# Patient Record
Sex: FEMALE | Race: WHITE | NOT HISPANIC OR LATINO | URBAN - METROPOLITAN AREA
[De-identification: names, ages, dates, MRNs, and addresses within clinical notes are randomized per-mention and may not be internally consistent; named-entity substitution may affect disease eponyms.]

---

## 2017-05-01 ENCOUNTER — INPATIENT (INPATIENT)
Facility: HOSPITAL | Age: 77
LOS: 3 days | Discharge: HOME | DRG: 392 | End: 2017-05-05
Attending: HOSPITALIST | Admitting: INTERNAL MEDICINE
Payer: COMMERCIAL

## 2017-05-01 VITALS
HEART RATE: 87 BPM | RESPIRATION RATE: 18 BRPM | DIASTOLIC BLOOD PRESSURE: 82 MMHG | TEMPERATURE: 98 F | WEIGHT: 166.89 LBS | HEIGHT: 66 IN | SYSTOLIC BLOOD PRESSURE: 152 MMHG | OXYGEN SATURATION: 100 %

## 2017-05-01 PROBLEM — Z00.00 ENCOUNTER FOR PREVENTIVE HEALTH EXAMINATION: Status: ACTIVE | Noted: 2017-05-01

## 2017-05-01 RX ORDER — SODIUM CHLORIDE 9 MG/ML
3 INJECTION INTRAMUSCULAR; INTRAVENOUS; SUBCUTANEOUS EVERY 8 HOURS
Qty: 0 | Refills: 0 | Status: DISCONTINUED | OUTPATIENT
Start: 2017-05-01 | End: 2017-05-05

## 2017-05-01 NOTE — ED ADULT TRIAGE NOTE - CHIEF COMPLAINT QUOTE
Pt c/o suprapubic pain since Saturday and "went back to doctor today because the pain got worse and the doctor wanted me to get a cat scan but my insurance wouldn't approve it", denies pain/burning upon urination, c/o "watery, slimey something coming out of my rectum"

## 2017-05-01 NOTE — ED STATDOCS - PROGRESS NOTE DETAILS
75 y/o F pt w/ PMHx of diverticulitis presents to the ED c/o abd pain and rectal pain x2 days. Pt states that she had an abscess in her abd years ago. Pt went to her PMD and was sent to get a CT but could not. Pt states that her pain is diffuse but is worse to the lower abd. Pt states her BM's are liquid. Pt denies constipation, fever, chills, or any other complaints. NKDA. Focused evaluation, orders entered, placed in main for further evaluation by another provider.

## 2017-05-01 NOTE — ED PROVIDER NOTE - NS ED MD SCRIBE ATTENDING SCRIBE SECTIONS
HISTORY OF PRESENT ILLNESS/VITAL SIGNS( Pullset)/PHYSICAL EXAM/PAST MEDICAL/SURGICAL/SOCIAL HISTORY/REVIEW OF SYSTEMS/DISPOSITION

## 2017-05-01 NOTE — ED STATDOCS - OBJECTIVE STATEMENT
75 y/o F pt w/ PMHx of presents to the ED c/o abd pain and rectal pain x2 days. Pt states that she had an abscess in her abd years ago. Pt went to her PMD and was sent to get a CT but could not. Pt states that her pain is diffuse but is worse to the lower abd. Pt states her BM's are liquid. Pt denies constipation, fever, chills, or any other complaints. NKDA.

## 2017-05-01 NOTE — ED PROVIDER NOTE - OBJECTIVE STATEMENT
75 y/o F pt presents to ED c/o abdominal pain and diarrhea x4 days. Pt reports pain worsened 3 days ago and she went to the doctor and was put on abx. She went back to her doctor today and was sent to the ED for further evaluation. She notes her sx are similar to those she had with diverticulitis in the past. Pt denies fever, CP, SOB, nausea, vomiting, back pain, and neck pain. No further complaints at this time. Allergy to penicillin.

## 2017-05-02 DIAGNOSIS — K57.32 DIVERTICULITIS OF LARGE INTESTINE WITHOUT PERFORATION OR ABSCESS WITHOUT BLEEDING: ICD-10-CM

## 2017-05-02 LAB
ALBUMIN SERPL ELPH-MCNC: 3.5 G/DL — SIGNIFICANT CHANGE UP (ref 3.3–5.2)
ALP SERPL-CCNC: 67 U/L — SIGNIFICANT CHANGE UP (ref 40–120)
ALT FLD-CCNC: 9 U/L — SIGNIFICANT CHANGE UP
ANION GAP SERPL CALC-SCNC: 14 MMOL/L — SIGNIFICANT CHANGE UP (ref 5–17)
APPEARANCE UR: CLEAR — SIGNIFICANT CHANGE UP
APPEARANCE UR: SIGNIFICANT CHANGE UP
APTT BLD: 30.7 SEC — SIGNIFICANT CHANGE UP (ref 27.5–37.4)
AST SERPL-CCNC: 13 U/L — SIGNIFICANT CHANGE UP
BACTERIA # UR AUTO: ABNORMAL
BASOPHILS # BLD AUTO: 0 K/UL — SIGNIFICANT CHANGE UP (ref 0–0.2)
BASOPHILS NFR BLD AUTO: 0.2 % — SIGNIFICANT CHANGE UP (ref 0–2)
BILIRUB SERPL-MCNC: 0.6 MG/DL — SIGNIFICANT CHANGE UP (ref 0.4–2)
BILIRUB UR-MCNC: NEGATIVE — SIGNIFICANT CHANGE UP
BILIRUB UR-MCNC: SIGNIFICANT CHANGE UP
BLD GP AB SCN SERPL QL: SIGNIFICANT CHANGE UP
BUN SERPL-MCNC: 13 MG/DL — SIGNIFICANT CHANGE UP (ref 8–20)
CALCIUM SERPL-MCNC: 8.1 MG/DL — LOW (ref 8.6–10.2)
CHLORIDE SERPL-SCNC: 105 MMOL/L — SIGNIFICANT CHANGE UP (ref 98–107)
CO2 SERPL-SCNC: 20 MMOL/L — LOW (ref 22–29)
COLOR SPEC: SIGNIFICANT CHANGE UP
COLOR SPEC: YELLOW — SIGNIFICANT CHANGE UP
CREAT SERPL-MCNC: 0.56 MG/DL — SIGNIFICANT CHANGE UP (ref 0.5–1.3)
DIFF PNL FLD: ABNORMAL
DIFF PNL FLD: SIGNIFICANT CHANGE UP
EOSINOPHIL # BLD AUTO: 0 K/UL — SIGNIFICANT CHANGE UP (ref 0–0.5)
EOSINOPHIL NFR BLD AUTO: 0.2 % — SIGNIFICANT CHANGE UP (ref 0–6)
EPI CELLS # UR: SIGNIFICANT CHANGE UP
GLUCOSE SERPL-MCNC: 109 MG/DL — SIGNIFICANT CHANGE UP (ref 70–115)
GLUCOSE UR QL: NEGATIVE MG/DL — SIGNIFICANT CHANGE UP
GLUCOSE UR QL: SIGNIFICANT CHANGE UP MG/DL
HCT VFR BLD CALC: 38.5 % — SIGNIFICANT CHANGE UP (ref 37–47)
HGB BLD-MCNC: 13 G/DL — SIGNIFICANT CHANGE UP (ref 12–16)
INR BLD: 1.19 RATIO — HIGH (ref 0.88–1.16)
KETONES UR-MCNC: ABNORMAL
KETONES UR-MCNC: SIGNIFICANT CHANGE UP
LEUKOCYTE ESTERASE UR-ACNC: ABNORMAL
LEUKOCYTE ESTERASE UR-ACNC: SIGNIFICANT CHANGE UP
LIDOCAIN IGE QN: 17 U/L — LOW (ref 22–51)
LYMPHOCYTES # BLD AUTO: 1.7 K/UL — SIGNIFICANT CHANGE UP (ref 1–4.8)
LYMPHOCYTES # BLD AUTO: 13.3 % — LOW (ref 20–55)
MCHC RBC-ENTMCNC: 30.2 PG — SIGNIFICANT CHANGE UP (ref 27–31)
MCHC RBC-ENTMCNC: 33.8 G/DL — SIGNIFICANT CHANGE UP (ref 32–36)
MCV RBC AUTO: 89.5 FL — SIGNIFICANT CHANGE UP (ref 81–99)
MONOCYTES # BLD AUTO: 1 K/UL — HIGH (ref 0–0.8)
MONOCYTES NFR BLD AUTO: 7.7 % — SIGNIFICANT CHANGE UP (ref 3–10)
NEUTROPHILS # BLD AUTO: 10.3 K/UL — HIGH (ref 1.8–8)
NEUTROPHILS NFR BLD AUTO: 78.4 % — HIGH (ref 37–73)
NITRITE UR-MCNC: POSITIVE
NITRITE UR-MCNC: SIGNIFICANT CHANGE UP
PH UR: 5 — SIGNIFICANT CHANGE UP (ref 5–8)
PH UR: SIGNIFICANT CHANGE UP (ref 5–8)
PLATELET # BLD AUTO: 305 K/UL — SIGNIFICANT CHANGE UP (ref 150–400)
POTASSIUM SERPL-MCNC: 3.6 MMOL/L — SIGNIFICANT CHANGE UP (ref 3.5–5.3)
POTASSIUM SERPL-SCNC: 3.6 MMOL/L — SIGNIFICANT CHANGE UP (ref 3.5–5.3)
PROT SERPL-MCNC: 6.4 G/DL — LOW (ref 6.6–8.7)
PROT UR-MCNC: 30 MG/DL
PROT UR-MCNC: SIGNIFICANT CHANGE UP MG/DL
PROTHROM AB SERPL-ACNC: 13.1 SEC — HIGH (ref 9.8–12.7)
RBC # BLD: 4.3 M/UL — LOW (ref 4.4–5.2)
RBC # FLD: 13.3 % — SIGNIFICANT CHANGE UP (ref 11–15.6)
RBC CASTS # UR COMP ASSIST: ABNORMAL /HPF (ref 0–4)
RBC CASTS # UR COMP ASSIST: SIGNIFICANT CHANGE UP /HPF (ref 0–4)
SODIUM SERPL-SCNC: 139 MMOL/L — SIGNIFICANT CHANGE UP (ref 135–145)
SP GR SPEC: 1.01 — SIGNIFICANT CHANGE UP (ref 1.01–1.02)
SP GR SPEC: SIGNIFICANT CHANGE UP (ref 1.01–1.02)
TROPONIN T SERPL-MCNC: <0.01 NG/ML — SIGNIFICANT CHANGE UP (ref 0–0.06)
TYPE + AB SCN PNL BLD: SIGNIFICANT CHANGE UP
UROBILINOGEN FLD QL: 1 MG/DL
UROBILINOGEN FLD QL: SIGNIFICANT CHANGE UP MG/DL
WBC # BLD: 13.1 K/UL — HIGH (ref 4.8–10.8)
WBC # FLD AUTO: 13.1 K/UL — HIGH (ref 4.8–10.8)
WBC UR QL: ABNORMAL
WBC UR QL: SIGNIFICANT CHANGE UP /HPF (ref 0–5)

## 2017-05-02 PROCEDURE — 74177 CT ABD & PELVIS W/CONTRAST: CPT | Mod: 26

## 2017-05-02 PROCEDURE — 99285 EMERGENCY DEPT VISIT HI MDM: CPT

## 2017-05-02 PROCEDURE — 99222 1ST HOSP IP/OBS MODERATE 55: CPT

## 2017-05-02 RX ORDER — METRONIDAZOLE 500 MG
500 TABLET ORAL ONCE
Qty: 0 | Refills: 0 | Status: DISCONTINUED | OUTPATIENT
Start: 2017-05-02 | End: 2017-05-02

## 2017-05-02 RX ORDER — CEFTRIAXONE 500 MG/1
INJECTION, POWDER, FOR SOLUTION INTRAMUSCULAR; INTRAVENOUS
Qty: 0 | Refills: 0 | Status: DISCONTINUED | OUTPATIENT
Start: 2017-05-02 | End: 2017-05-05

## 2017-05-02 RX ORDER — SODIUM CHLORIDE 9 MG/ML
1000 INJECTION INTRAMUSCULAR; INTRAVENOUS; SUBCUTANEOUS ONCE
Qty: 0 | Refills: 0 | Status: COMPLETED | OUTPATIENT
Start: 2017-05-02 | End: 2017-05-02

## 2017-05-02 RX ORDER — CEFTRIAXONE 500 MG/1
1 INJECTION, POWDER, FOR SOLUTION INTRAMUSCULAR; INTRAVENOUS ONCE
Qty: 0 | Refills: 0 | Status: COMPLETED | OUTPATIENT
Start: 2017-05-02 | End: 2017-05-02

## 2017-05-02 RX ORDER — CEFPODOXIME PROXETIL 100 MG
200 TABLET ORAL ONCE
Qty: 0 | Refills: 0 | Status: COMPLETED | OUTPATIENT
Start: 2017-05-02 | End: 2017-05-02

## 2017-05-02 RX ORDER — METRONIDAZOLE 500 MG
500 TABLET ORAL EVERY 8 HOURS
Qty: 0 | Refills: 0 | Status: DISCONTINUED | OUTPATIENT
Start: 2017-05-02 | End: 2017-05-05

## 2017-05-02 RX ORDER — METRONIDAZOLE 500 MG
500 TABLET ORAL ONCE
Qty: 0 | Refills: 0 | Status: COMPLETED | OUTPATIENT
Start: 2017-05-02 | End: 2017-05-02

## 2017-05-02 RX ORDER — ACETAMINOPHEN 500 MG
650 TABLET ORAL EVERY 6 HOURS
Qty: 0 | Refills: 0 | Status: DISCONTINUED | OUTPATIENT
Start: 2017-05-02 | End: 2017-05-05

## 2017-05-02 RX ORDER — ENOXAPARIN SODIUM 100 MG/ML
40 INJECTION SUBCUTANEOUS EVERY 24 HOURS
Qty: 0 | Refills: 0 | Status: DISCONTINUED | OUTPATIENT
Start: 2017-05-02 | End: 2017-05-05

## 2017-05-02 RX ORDER — CEFTRIAXONE 500 MG/1
1 INJECTION, POWDER, FOR SOLUTION INTRAMUSCULAR; INTRAVENOUS EVERY 24 HOURS
Qty: 0 | Refills: 0 | Status: DISCONTINUED | OUTPATIENT
Start: 2017-05-03 | End: 2017-05-05

## 2017-05-02 RX ORDER — MORPHINE SULFATE 50 MG/1
4 CAPSULE, EXTENDED RELEASE ORAL ONCE
Qty: 0 | Refills: 0 | Status: DISCONTINUED | OUTPATIENT
Start: 2017-05-02 | End: 2017-05-02

## 2017-05-02 RX ORDER — MORPHINE SULFATE 50 MG/1
2 CAPSULE, EXTENDED RELEASE ORAL EVERY 4 HOURS
Qty: 0 | Refills: 0 | Status: DISCONTINUED | OUTPATIENT
Start: 2017-05-02 | End: 2017-05-05

## 2017-05-02 RX ORDER — DEXTROSE MONOHYDRATE, SODIUM CHLORIDE, AND POTASSIUM CHLORIDE 50; .745; 4.5 G/1000ML; G/1000ML; G/1000ML
1000 INJECTION, SOLUTION INTRAVENOUS
Qty: 0 | Refills: 0 | Status: DISCONTINUED | OUTPATIENT
Start: 2017-05-02 | End: 2017-05-05

## 2017-05-02 RX ORDER — ONDANSETRON 8 MG/1
4 TABLET, FILM COATED ORAL EVERY 8 HOURS
Qty: 0 | Refills: 0 | Status: DISCONTINUED | OUTPATIENT
Start: 2017-05-02 | End: 2017-05-05

## 2017-05-02 RX ADMIN — ENOXAPARIN SODIUM 40 MILLIGRAM(S): 100 INJECTION SUBCUTANEOUS at 17:06

## 2017-05-02 RX ADMIN — CEFTRIAXONE 100 GRAM(S): 500 INJECTION, POWDER, FOR SOLUTION INTRAMUSCULAR; INTRAVENOUS at 17:06

## 2017-05-02 RX ADMIN — Medication 100 MILLIGRAM(S): at 22:33

## 2017-05-02 RX ADMIN — SODIUM CHLORIDE 3 MILLILITER(S): 9 INJECTION INTRAMUSCULAR; INTRAVENOUS; SUBCUTANEOUS at 22:35

## 2017-05-02 RX ADMIN — Medication 200 MILLIGRAM(S): at 13:08

## 2017-05-02 RX ADMIN — DEXTROSE MONOHYDRATE, SODIUM CHLORIDE, AND POTASSIUM CHLORIDE 75 MILLILITER(S): 50; .745; 4.5 INJECTION, SOLUTION INTRAVENOUS at 17:06

## 2017-05-02 RX ADMIN — SODIUM CHLORIDE 250 MILLILITER(S): 9 INJECTION INTRAMUSCULAR; INTRAVENOUS; SUBCUTANEOUS at 01:15

## 2017-05-02 RX ADMIN — SODIUM CHLORIDE 3 MILLILITER(S): 9 INJECTION INTRAMUSCULAR; INTRAVENOUS; SUBCUTANEOUS at 05:24

## 2017-05-02 RX ADMIN — SODIUM CHLORIDE 3 MILLILITER(S): 9 INJECTION INTRAMUSCULAR; INTRAVENOUS; SUBCUTANEOUS at 01:05

## 2017-05-02 RX ADMIN — Medication 100 MILLIGRAM(S): at 13:57

## 2017-05-02 RX ADMIN — SODIUM CHLORIDE 3 MILLILITER(S): 9 INJECTION INTRAMUSCULAR; INTRAVENOUS; SUBCUTANEOUS at 12:39

## 2017-05-02 NOTE — H&P ADULT - ASSESSMENT
This is 76Y W with PMHx of diverticulitis about 7 years ago, colonoscopy about 6 years ago came to Ed c/o lower abdominal pain since Friday, associated with nausea and vomiting. As per patient, symptoms started on Friday, gradual in onset, getting worse, affecting lower abdomen L>R, moderate in intensity, worse during BM, associated with nausea, vomiting, poor oral appetite. She went to see PCP, started on cipro and flagyl, symptoms cont. to worsen, that's why she came to ED. She report chills and low grade fever. She is also c/o multiple episode of loose stool accompanied by mucus. Ct abdomen showed sigmoid diverticulitis.     A/P    >Sigmoid diverticulitis - failed outpatient therapy   admit to medical floor  start Rocephin and azithromycin   f/u blood c/s  zofran, pain control  GI consult requested - Dr. Crawley  clear liquid diet  IVF   f/u cbc, BMP    >DVT PPX -  Lovenox

## 2017-05-02 NOTE — H&P ADULT - NSHPPHYSICALEXAM_GEN_ALL_CORE
ICU Vital Signs Last 24 Hrs  T(C): 36.8, Max: 37.8 (05-02 @ 02:50)  T(F): 98.3, Max: 100 (05-02 @ 02:50)  HR: 71 (71 - 87)  BP: 156/76 (152/82 - 170/79)  BP(mean): --  ABP: --  ABP(mean): --  RR: 18 (18 - 18)  SpO2: 98% (98% - 100%)

## 2017-05-02 NOTE — H&P ADULT - HISTORY OF PRESENT ILLNESS
This is 76Y W with PMHx of diverticulitis about 7 years ago, colonoscopy about 6 years ago came to Ed c/o lower abdominal pain since Friday, associated with nausea and vomiting. As per patient, symptoms started on Friday, gradual in onset, getting worse, affecting lower abdomen L>R, moderate in intensity, worse during BM, associated with nausea, vomiting, poor oral appetite. She went to see PCP, started on cipro and flagyl, symptoms cont. to worsen, that's why she came to ED. She report chills and low grade fever. She is also c/o multiple episode of loose stool accompanied by mucus. Ct abdomen showed sigmoid diverticulitis.     As per patient, she had 1 episode of diverticulitis about 7 years ago, she also had colonoscopy after that, it was negative    Denied chest pain, SOB, headache, dizziness, weakness, numbness, urinary complaints.

## 2017-05-02 NOTE — ED ADULT NURSE REASSESSMENT NOTE - NS ED NURSE REASSESS COMMENT FT1
Patient ambulatory without difficulty, awake, alert, and oriented times 3, breathing unlabored.  Patient ambulatory without difficulty, steady gait.  Patient aware of plan of care.  awaiting CT results.  States improvement.  Will continue to monitor
Patient resting quietly in stretcher, breathing unlabored.  Awaiting CT results.  Patient aware of plan of care.  Will continue to monitor
pt sleeping and appears comfortable, son at bedside, A&Ox3, pt c/o of abd pain but refuses any pain medications, resp even and unlabored no distress noted, will continue to monitor
Patient sitting comfortably on stretcher, awake, alert, and oriented times 3, breathing unlabored.  patient states improvement.  Awaiting CT scan.  patient aware of plan of care.  Will continue to monitor

## 2017-05-02 NOTE — ED ADULT NURSE NOTE - OBJECTIVE STATEMENT
pt A&Ox3, c/o generalized abd pain that started friday and increase saturday and went to PCP and was told she needed a CT pt has HX of diverticulitis and feels the same as the last time it was bothering her, resp even and unlabored no distress noted, abd soft and tender, pt denies ha, dizziness, chest pain n/v/d

## 2017-05-02 NOTE — CONSULT NOTE ADULT - SUBJECTIVE AND OBJECTIVE BOX
HPI:  77 y/o F pt presents to ED c/o abdominal pain and diarrhea x4 days. Pt reports pain worsened 3 days ago and she went to the doctor and was put on abx. She went back to her doctor today and was sent to the ED for further evaluation. She notes her sx are similar to those she had with diverticulitis in the past. Pt denies fever, CP, SOB, nausea, vomiting, back pain, and neck pain. No further complaints at this time. Allergy to penicillin.  as per patient, she has h/o acute diverticulitis about 8 yrs ago. she had colonoscopy by Dr. Grullon 8 yrs ago. she was now admitted with acute recurrent diverticulitis. no rectal bleed.	  	  	  	  	  	  	    PAST MEDICAL/SURGICAL/FAMILY/SOCIAL HISTORY:   Past Medical History:  No pertinent past medical history.    Past Surgical History:  No significant past surgical history.    Tobacco Usage:  · Tobacco Usage	Unknown if ever smoked	    · Attestation Comment: I have reviewed and confirmed nurses' notes for patient's medications, allergies, medical history, and surgical history.	    ALLERGIES AND HOME MEDICATIONS:   Allergies:        Allergies:  	penicillin: Drug, Unknown      PAST MEDICAL & SURGICAL HISTORY:  Diverticulitis  No significant past surgical history      REVIEW OF SYSTEMS    General: unremarkable, no fever    Skin/Breast: unremarkable  	  Ophthalmologic: unremarkable  	  ENMT:	unremarkable    Respiratory and Thorax: unremarkable  	  Cardiovascular:	unremarkable    Gastrointestinal:	 as above    Genitourinary:	unremarkable    Musculoskeletal:	unremarkable    Neurological:	unremarkable    Psychiatric:	unremarkable    Hematology/Lymphatics:	unremarkable    Endocrine:	unremarkable    Allergic/Immunologic:	unremarkable      MEDICATIONS  (STANDING):  sodium chloride 0.9% lock flush 3milliLiter(s) IV Push every 8 hours  cefTRIAXone   IVPB 1Gram(s) IV Intermittent once  metroNIDAZOLE  IVPB 500milliGRAM(s) IV Intermittent every 8 hours  cefTRIAXone   IVPB  IV Intermittent   sodium chloride 0.45% with potassium chloride 20 mEq/L 1000milliLiter(s) IV Continuous <Continuous>  enoxaparin Injectable 40milliGRAM(s) SubCutaneous every 24 hours    MEDICATIONS  (PRN):  ondansetron Injectable 4milliGRAM(s) IV Push every 8 hours PRN Nausea and/or Vomiting  morphine  - Injectable 2milliGRAM(s) IV Push every 4 hours PRN Severe Pain (7 - 10)  acetaminophen   Tablet 650milliGRAM(s) Oral every 6 hours PRN For Temp greater than 38 C (100.4 F)      Allergies    penicillin (Unknown)    Intolerances        SOCIAL HISTORY:    FAMILY HISTORY:      Vital Signs Last 24 Hrs  T(C): 36.8, Max: 37.8 ( @ 02:50)  T(F): 98.3, Max: 100 ( @ 02:50)  HR: 71 (71 - 87)  BP: 156/76 (152/82 - 170/79)  BP(mean): --  RR: 18 (18 - 18)  SpO2: 98% (98% - 100%)      PHYSICAL EXAM:    Constitutional: no fever, hemodynamically stable    Eyes: unremarkable    ENMT: unremarkable    Neck: unremarkable    Breasts: deferred    Back: unremarkable    Respiratory: unremarkable    Cardiovascular: unremarkable    Gastrointestinal: bowel sounds present, soft, non-tender, no organomegaly    Genitourinary: deferred    Rectal: deferred    Extremities: unremarkable    Vascular: unremarkable    Neurological: Awake, alert, oriented x3, no focal neurological deficit    Skin: unremarkable    Lymph Nodes: deferred    Musculoskeletal: unremarkable    Psychiatric: unremarkable    LABS:                        13.0   13.1  )-----------( 305      ( 02 May 2017 02:29 )             38.5         139  |  105  |  13.0  ----------------------------<  109  3.6   |  20.0<L>  |  0.56    Ca    8.1<L>      02 May 2017 04:25    TPro  6.4<L>  /  Alb  3.5  /  TBili  0.6  /  DBili  x   /  AST  13  /  ALT  9   /  AlkPhos  67  05-02    PT/INR - ( 02 May 2017 02:29 )   PT: 13.1 sec;   INR: 1.19 ratio         PTT - ( 02 May 2017 02:29 )  PTT:30.7 sec  Urinalysis Basic - ( 02 May 2017 12:09 )    Color: Yellow / Appearance: Clear / S.010 / pH: x  Gluc: x / Ketone: Small  / Bili: Negative / Urobili: 1 mg/dL   Blood: x / Protein: 30 mg/dL / Nitrite: Positive   Leuk Esterase: Small / RBC: 3-5 /HPF / WBC 6-10   Sq Epi: x / Non Sq Epi: Few / Bacteria: Few        RADIOLOGY & ADDITIONAL STUDIES:        IMPRESSION:  77 y/o F with h/o acute diverticulitis 8 yrs ago, who was admitted with recurrent sigmoid diverticulitis. 	        PLAN:  continue clears for now  IV/oral  antibiotics (total 2 weeks)  IV hydration  outpatient colonoscopy in 6-8 weeks    thanks for the consult.

## 2017-05-03 LAB
ABO RH CONFIRMATION: SIGNIFICANT CHANGE UP
ANION GAP SERPL CALC-SCNC: 15 MMOL/L — SIGNIFICANT CHANGE UP (ref 5–17)
BUN SERPL-MCNC: 10 MG/DL — SIGNIFICANT CHANGE UP (ref 8–20)
CALCIUM SERPL-MCNC: 8.2 MG/DL — LOW (ref 8.6–10.2)
CHLORIDE SERPL-SCNC: 109 MMOL/L — HIGH (ref 98–107)
CO2 SERPL-SCNC: 20 MMOL/L — LOW (ref 22–29)
CREAT SERPL-MCNC: 0.53 MG/DL — SIGNIFICANT CHANGE UP (ref 0.5–1.3)
GLUCOSE SERPL-MCNC: 91 MG/DL — SIGNIFICANT CHANGE UP (ref 70–115)
HCT VFR BLD CALC: 32.7 % — LOW (ref 37–47)
HGB BLD-MCNC: 10.9 G/DL — LOW (ref 12–16)
MAGNESIUM SERPL-MCNC: 1.7 MG/DL — LOW (ref 1.8–2.5)
MCHC RBC-ENTMCNC: 30.2 PG — SIGNIFICANT CHANGE UP (ref 27–31)
MCHC RBC-ENTMCNC: 33.3 G/DL — SIGNIFICANT CHANGE UP (ref 32–36)
MCV RBC AUTO: 90.6 FL — SIGNIFICANT CHANGE UP (ref 81–99)
PLATELET # BLD AUTO: 273 K/UL — SIGNIFICANT CHANGE UP (ref 150–400)
POTASSIUM SERPL-MCNC: 3.7 MMOL/L — SIGNIFICANT CHANGE UP (ref 3.5–5.3)
POTASSIUM SERPL-SCNC: 3.7 MMOL/L — SIGNIFICANT CHANGE UP (ref 3.5–5.3)
RBC # BLD: 3.61 M/UL — LOW (ref 4.4–5.2)
RBC # FLD: 12.9 % — SIGNIFICANT CHANGE UP (ref 11–15.6)
SODIUM SERPL-SCNC: 144 MMOL/L — SIGNIFICANT CHANGE UP (ref 135–145)
WBC # BLD: 6.5 K/UL — SIGNIFICANT CHANGE UP (ref 4.8–10.8)
WBC # FLD AUTO: 6.5 K/UL — SIGNIFICANT CHANGE UP (ref 4.8–10.8)

## 2017-05-03 PROCEDURE — 99233 SBSQ HOSP IP/OBS HIGH 50: CPT

## 2017-05-03 RX ORDER — HYDRALAZINE HCL 50 MG
10 TABLET ORAL ONCE
Qty: 0 | Refills: 0 | Status: COMPLETED | OUTPATIENT
Start: 2017-05-03 | End: 2017-05-04

## 2017-05-03 RX ORDER — MAGNESIUM SULFATE 500 MG/ML
1 VIAL (ML) INJECTION ONCE
Qty: 0 | Refills: 0 | Status: COMPLETED | OUTPATIENT
Start: 2017-05-03 | End: 2017-05-03

## 2017-05-03 RX ADMIN — Medication 100 GRAM(S): at 11:44

## 2017-05-03 RX ADMIN — SODIUM CHLORIDE 3 MILLILITER(S): 9 INJECTION INTRAMUSCULAR; INTRAVENOUS; SUBCUTANEOUS at 14:50

## 2017-05-03 RX ADMIN — DEXTROSE MONOHYDRATE, SODIUM CHLORIDE, AND POTASSIUM CHLORIDE 75 MILLILITER(S): 50; .745; 4.5 INJECTION, SOLUTION INTRAVENOUS at 05:24

## 2017-05-03 RX ADMIN — SODIUM CHLORIDE 3 MILLILITER(S): 9 INJECTION INTRAMUSCULAR; INTRAVENOUS; SUBCUTANEOUS at 21:08

## 2017-05-03 RX ADMIN — Medication 100 MILLIGRAM(S): at 15:47

## 2017-05-03 RX ADMIN — Medication 100 MILLIGRAM(S): at 05:23

## 2017-05-03 RX ADMIN — SODIUM CHLORIDE 3 MILLILITER(S): 9 INJECTION INTRAMUSCULAR; INTRAVENOUS; SUBCUTANEOUS at 05:25

## 2017-05-03 RX ADMIN — CEFTRIAXONE 100 GRAM(S): 500 INJECTION, POWDER, FOR SOLUTION INTRAMUSCULAR; INTRAVENOUS at 14:50

## 2017-05-03 RX ADMIN — DEXTROSE MONOHYDRATE, SODIUM CHLORIDE, AND POTASSIUM CHLORIDE 75 MILLILITER(S): 50; .745; 4.5 INJECTION, SOLUTION INTRAVENOUS at 21:08

## 2017-05-03 RX ADMIN — Medication 100 MILLIGRAM(S): at 21:03

## 2017-05-03 NOTE — PROGRESS NOTE ADULT - SUBJECTIVE AND OBJECTIVE BOX
Internal Medicine Hospitalist - Dr. Jc KILGORE    [id number]    76y      Female    Patient is a 76y old  Female who presents with a chief complaint of abdominal pain (02 May 2017 15:00)    HPI:  This is 76Y W with PMHx of diverticulitis about 7 years ago, colonoscopy about 6 years ago came to Ed c/o lower abdominal pain since Friday, associated with nausea and vomiting. As per patient, symptoms started on Friday, gradual in onset, getting worse, affecting lower abdomen L>R, moderate in intensity, worse during BM, associated with nausea, vomiting, poor oral appetite. She went to see PCP, started on cipro and flagyl, symptoms cont. to worsen, that's why she came to ED. She report chills and low grade fever. She is also c/o multiple episode of loose stool accompanied by mucus. Ct abdomen showed sigmoid diverticulitis.     As per patient, she had 1 episode of diverticulitis about 7 years ago, she also had colonoscopy after that, it was negative    Denied chest pain, SOB, headache, dizziness, weakness, numbness, urinary complaints. (02 May 2017 15:00)      INTERVAL HPI/ OVERNIGHT EVENTS: Patient is seen and examined, report abdominal pain is better, still having loose BM, denied nausea, vomiting, fever, chills     REVIEW OF SYSTEMS:    Denied fever, chills nausea, vomiting, chest pain, SOB, headache, dizziness    PHYSICAL EXAM:    Vital Signs Last 24 Hrs  T(C): 36.8, Max: 36.9 (-03 @ 03:07)  T(F): 98.2, Max: 98.4 (05-03 @ 03:07)  HR: 71 (71 - 79)  BP: 147/74 (147/74 - 166/88)  BP(mean): --  RR: 18 (18 - 20)  SpO2: 97% (97% - 100%)    GENERAL: NAD  HEENT: EOMI  Neck: supple  CHEST/LUNG: positive air entry   HEART: S1S2+ audible  ABDOMEN: Soft, Nontender, Nondistended; Bowel sounds present  EXTREMITIES:  no edema  CNS: AAO X 3  Psychiatry: normal mood    LABS:                        10.9   6.5   )-----------( 273      ( 03 May 2017 07:42 )             32.7     -    144  |  109<H>  |  10.0  ----------------------------<  91  3.7   |  20.0<L>  |  0.53    Ca    8.2<L>      03 May 2017 07:42  Mg     1.7     05-03    TPro  6.4<L>  /  Alb  3.5  /  TBili  0.6  /  DBili  x   /  AST  13  /  ALT  9   /  AlkPhos  67  05-02    PT/INR - ( 02 May 2017 02:29 )   PT: 13.1 sec;   INR: 1.19 ratio         PTT - ( 02 May 2017 02:29 )  PTT:30.7 sec  Urinalysis Basic - ( 02 May 2017 12:09 )    Color: Yellow / Appearance: Clear / S.010 / pH: x  Gluc: x / Ketone: Small  / Bili: Negative / Urobili: 1 mg/dL   Blood: x / Protein: 30 mg/dL / Nitrite: Positive   Leuk Esterase: Small / RBC: 3-5 /HPF / WBC 6-10   Sq Epi: x / Non Sq Epi: Few / Bacteria: Few          MEDICATIONS  (STANDING):  sodium chloride 0.9% lock flush 3milliLiter(s) IV Push every 8 hours  metroNIDAZOLE  IVPB 500milliGRAM(s) IV Intermittent every 8 hours  cefTRIAXone   IVPB  IV Intermittent   sodium chloride 0.45% with potassium chloride 20 mEq/L 1000milliLiter(s) IV Continuous <Continuous>  enoxaparin Injectable 40milliGRAM(s) SubCutaneous every 24 hours  cefTRIAXone   IVPB 1Gram(s) IV Intermittent every 24 hours  magnesium sulfate  IVPB 1Gram(s) IV Intermittent once    MEDICATIONS  (PRN):  ondansetron Injectable 4milliGRAM(s) IV Push every 8 hours PRN Nausea and/or Vomiting  morphine  - Injectable 2milliGRAM(s) IV Push every 4 hours PRN Severe Pain (7 - 10)  acetaminophen   Tablet 650milliGRAM(s) Oral every 6 hours PRN For Temp greater than 38 C (100.4 F)      RADIOLOGY & ADDITIONAL TEST

## 2017-05-03 NOTE — PROGRESS NOTE ADULT - ASSESSMENT
IMPRESSION:  75 y/o F with h/o acute diverticulitis 8 yrs ago, who was admitted with recurrent sigmoid diverticulitis. 	        PLAN:  advance diet (low residue) as tolerated  IV/oral  antibiotics (total 2 weeks)  outpatient colonoscopy in 6-8 weeks

## 2017-05-03 NOTE — PROGRESS NOTE ADULT - ASSESSMENT
This is 76Y W with PMHx of diverticulitis about 7 years ago, colonoscopy about 6 years ago came to Ed c/o lower abdominal pain since Friday, associated with nausea and vomiting. As per patient, symptoms started on Friday, gradual in onset, getting worse, affecting lower abdomen L>R, moderate in intensity, worse during BM, associated with nausea, vomiting, poor oral appetite. She went to see PCP, started on cipro and flagyl, symptoms cont. to worsen, that's why she came to ED. She is also c/o multiple episode of loose stool accompanied by mucus. Ct abdomen showed sigmoid diverticulitis.     A/P    >Sigmoid diverticulitis - failed outpatient therapy   appreciate Gi input   cont. Rocephin and azithromycin , improving leucocytosis   f/u blood c/s  zofran, pain control  advance diet as tolerated   IVF     >Hypomagnesemia - mag supplement     >DVT PPX -  Lovenox

## 2017-05-03 NOTE — PROGRESS NOTE ADULT - SUBJECTIVE AND OBJECTIVE BOX
INTERVAL HPI/OVERNIGHT EVENTS:  Patient seen and examined    MEDICATIONS  (STANDING):  sodium chloride 0.9% lock flush 3milliLiter(s) IV Push every 8 hours  metroNIDAZOLE  IVPB 500milliGRAM(s) IV Intermittent every 8 hours  cefTRIAXone   IVPB  IV Intermittent   sodium chloride 0.45% with potassium chloride 20 mEq/L 1000milliLiter(s) IV Continuous <Continuous>  enoxaparin Injectable 40milliGRAM(s) SubCutaneous every 24 hours  cefTRIAXone   IVPB 1Gram(s) IV Intermittent every 24 hours    MEDICATIONS  (PRN):  ondansetron Injectable 4milliGRAM(s) IV Push every 8 hours PRN Nausea and/or Vomiting  morphine  - Injectable 2milliGRAM(s) IV Push every 4 hours PRN Severe Pain (7 - 10)  acetaminophen   Tablet 650milliGRAM(s) Oral every 6 hours PRN For Temp greater than 38 C (100.4 F)      Allergies    penicillin (Unknown)    Intolerances        Vital Signs Last 24 Hrs  T(C): 36.8, Max: 36.9 (05-03 @ 03:07)  T(F): 98.2, Max: 98.4 (05-03 @ 03:07)  HR: 71 (71 - 79)  BP: 147/74 (147/74 - 166/88)  BP(mean): --  RR: 18 (18 - 20)  SpO2: 97% (97% - 100%)    PHYSICAL EXAM:  General: NAD.  CVS: S1, S2  Chest: air entry bilaterally present  Abd: BS present, soft, non-tender      LABS:                        10.9   6.5   )-----------( 273      ( 03 May 2017 07:42 )             32.7     05-03    144  |  109<H>  |  10.0  ----------------------------<  91  3.7   |  20.0<L>  |  0.53    Ca    8.2<L>      03 May 2017 07:42  Mg     1.7     05-03    TPro  6.4<L>  /  Alb  3.5  /  TBili  0.6  /  DBili  x   /  AST  13  /  ALT  9   /  AlkPhos  67  05-02    PT/INR - ( 02 May 2017 02:29 )   PT: 13.1 sec;   INR: 1.19 ratio         PTT - ( 02 May 2017 02:29 )  PTT:30.7 sec

## 2017-05-04 DIAGNOSIS — Z29.9 ENCOUNTER FOR PROPHYLACTIC MEASURES, UNSPECIFIED: ICD-10-CM

## 2017-05-04 DIAGNOSIS — K57.32 DIVERTICULITIS OF LARGE INTESTINE WITHOUT PERFORATION OR ABSCESS WITHOUT BLEEDING: ICD-10-CM

## 2017-05-04 DIAGNOSIS — E83.42 HYPOMAGNESEMIA: ICD-10-CM

## 2017-05-04 DIAGNOSIS — I15.8 OTHER SECONDARY HYPERTENSION: ICD-10-CM

## 2017-05-04 LAB
ANION GAP SERPL CALC-SCNC: 14 MMOL/L — SIGNIFICANT CHANGE UP (ref 5–17)
BUN SERPL-MCNC: 8 MG/DL — SIGNIFICANT CHANGE UP (ref 8–20)
CALCIUM SERPL-MCNC: 8.3 MG/DL — LOW (ref 8.6–10.2)
CHLORIDE SERPL-SCNC: 108 MMOL/L — HIGH (ref 98–107)
CO2 SERPL-SCNC: 19 MMOL/L — LOW (ref 22–29)
CREAT SERPL-MCNC: 0.49 MG/DL — LOW (ref 0.5–1.3)
GLUCOSE SERPL-MCNC: 92 MG/DL — SIGNIFICANT CHANGE UP (ref 70–115)
HCT VFR BLD CALC: 32.3 % — LOW (ref 37–47)
HGB BLD-MCNC: 11 G/DL — LOW (ref 12–16)
MAGNESIUM SERPL-MCNC: 1.8 MG/DL — SIGNIFICANT CHANGE UP (ref 1.8–2.5)
MCHC RBC-ENTMCNC: 30.3 PG — SIGNIFICANT CHANGE UP (ref 27–31)
MCHC RBC-ENTMCNC: 34.1 G/DL — SIGNIFICANT CHANGE UP (ref 32–36)
MCV RBC AUTO: 89 FL — SIGNIFICANT CHANGE UP (ref 81–99)
PLATELET # BLD AUTO: 277 K/UL — SIGNIFICANT CHANGE UP (ref 150–400)
POTASSIUM SERPL-MCNC: 3.8 MMOL/L — SIGNIFICANT CHANGE UP (ref 3.5–5.3)
POTASSIUM SERPL-SCNC: 3.8 MMOL/L — SIGNIFICANT CHANGE UP (ref 3.5–5.3)
RBC # BLD: 3.63 M/UL — LOW (ref 4.4–5.2)
RBC # FLD: 13 % — SIGNIFICANT CHANGE UP (ref 11–15.6)
SODIUM SERPL-SCNC: 141 MMOL/L — SIGNIFICANT CHANGE UP (ref 135–145)
WBC # BLD: 5.3 K/UL — SIGNIFICANT CHANGE UP (ref 4.8–10.8)
WBC # FLD AUTO: 5.3 K/UL — SIGNIFICANT CHANGE UP (ref 4.8–10.8)

## 2017-05-04 PROCEDURE — 99232 SBSQ HOSP IP/OBS MODERATE 35: CPT

## 2017-05-04 RX ORDER — AMLODIPINE BESYLATE 2.5 MG/1
2.5 TABLET ORAL DAILY
Qty: 0 | Refills: 0 | Status: DISCONTINUED | OUTPATIENT
Start: 2017-05-04 | End: 2017-05-05

## 2017-05-04 RX ORDER — HYDRALAZINE HCL 50 MG
10 TABLET ORAL EVERY 6 HOURS
Qty: 0 | Refills: 0 | Status: DISCONTINUED | OUTPATIENT
Start: 2017-05-04 | End: 2017-05-05

## 2017-05-04 RX ORDER — PANTOPRAZOLE SODIUM 20 MG/1
40 TABLET, DELAYED RELEASE ORAL DAILY
Qty: 0 | Refills: 0 | Status: DISCONTINUED | OUTPATIENT
Start: 2017-05-04 | End: 2017-05-05

## 2017-05-04 RX ADMIN — SODIUM CHLORIDE 3 MILLILITER(S): 9 INJECTION INTRAMUSCULAR; INTRAVENOUS; SUBCUTANEOUS at 16:49

## 2017-05-04 RX ADMIN — PANTOPRAZOLE SODIUM 40 MILLIGRAM(S): 20 TABLET, DELAYED RELEASE ORAL at 15:04

## 2017-05-04 RX ADMIN — Medication 100 MILLIGRAM(S): at 05:49

## 2017-05-04 RX ADMIN — Medication 100 MILLIGRAM(S): at 21:37

## 2017-05-04 RX ADMIN — Medication 100 MILLIGRAM(S): at 15:05

## 2017-05-04 RX ADMIN — SODIUM CHLORIDE 3 MILLILITER(S): 9 INJECTION INTRAMUSCULAR; INTRAVENOUS; SUBCUTANEOUS at 20:56

## 2017-05-04 RX ADMIN — Medication 10 MILLIGRAM(S): at 00:21

## 2017-05-04 RX ADMIN — SODIUM CHLORIDE 3 MILLILITER(S): 9 INJECTION INTRAMUSCULAR; INTRAVENOUS; SUBCUTANEOUS at 05:52

## 2017-05-04 RX ADMIN — CEFTRIAXONE 100 GRAM(S): 500 INJECTION, POWDER, FOR SOLUTION INTRAMUSCULAR; INTRAVENOUS at 11:41

## 2017-05-04 RX ADMIN — DEXTROSE MONOHYDRATE, SODIUM CHLORIDE, AND POTASSIUM CHLORIDE 75 MILLILITER(S): 50; .745; 4.5 INJECTION, SOLUTION INTRAVENOUS at 15:05

## 2017-05-04 NOTE — PROGRESS NOTE ADULT - SUBJECTIVE AND OBJECTIVE BOX
INTERVAL HPI/OVERNIGHT EVENTS:  Patient seen and examined    MEDICATIONS  (STANDING):  sodium chloride 0.9% lock flush 3milliLiter(s) IV Push every 8 hours  metroNIDAZOLE  IVPB 500milliGRAM(s) IV Intermittent every 8 hours  cefTRIAXone   IVPB  IV Intermittent   sodium chloride 0.45% with potassium chloride 20 mEq/L 1000milliLiter(s) IV Continuous <Continuous>  enoxaparin Injectable 40milliGRAM(s) SubCutaneous every 24 hours  cefTRIAXone   IVPB 1Gram(s) IV Intermittent every 24 hours  pantoprazole  Injectable 40milliGRAM(s) IV Push daily    MEDICATIONS  (PRN):  ondansetron Injectable 4milliGRAM(s) IV Push every 8 hours PRN Nausea and/or Vomiting  morphine  - Injectable 2milliGRAM(s) IV Push every 4 hours PRN Severe Pain (7 - 10)  acetaminophen   Tablet 650milliGRAM(s) Oral every 6 hours PRN For Temp greater than 38 C (100.4 F)      Allergies    penicillin (Unknown)    Intolerances        Vital Signs Last 24 Hrs  T(C): 36.8, Max: 37 (05-04 @ 00:02)  T(F): 98.2, Max: 98.6 (05-04 @ 00:02)  HR: 69 (68 - 72)  BP: 156/77 (130/78 - 181/75)  BP(mean): --  RR: 18 (18 - 18)  SpO2: 97% (97% - 100%)    PHYSICAL EXAM:  General: NAD.  CVS: S1, S2  Chest: air entry bilaterally present  Abd: BS present, soft, non-tender      LABS:                        11.0   5.3   )-----------( 277      ( 04 May 2017 07:53 )             32.3     05-04    141  |  108<H>  |  8.0  ----------------------------<  92  3.8   |  19.0<L>  |  0.49<L>    Ca    8.3<L>      04 May 2017 07:52  Mg     1.8     05-04          RADIOLOGY & ADDITIONAL TESTS:

## 2017-05-04 NOTE — PROGRESS NOTE ADULT - ASSESSMENT
76 year old female with PMHx Diverticulitis presents with lower abdominal pain and diarrhea after having failed oral antibiotic therapy. Leukocytosis at admission, CT abdomen confirms sigmoid diverticulitis. She was started on IV antibiotics and liquid diet with improvement in pain and resolution of leukocytosis. Remains afebrile, still continues to have diarrhea. Cultures Pending.  She did have some hypomagnesemia which was replaced.  Noted to be hypertensive, likely secondary to pain, IVF.

## 2017-05-04 NOTE — PROGRESS NOTE ADULT - ASSESSMENT
IMPRESSION:  77 y/o F with h/o acute diverticulitis 8 yrs ago, who was admitted with recurrent sigmoid diverticulitis. abd pain improving. no fever. c/o loose stool and epigastric discomfort after drinking orange juice	        PLAN:  advance diet (low residue) as tolerated  IV/oral  antibiotics (total 2 weeks)  IV PPI daily  outpatient colonoscopy in 6-8 weeks

## 2017-05-04 NOTE — PROGRESS NOTE ADULT - PROBLEM SELECTOR PLAN 1
Continue IV antibiotics  Analgesics, antiemetics as needed  Follow up cultures  Advance diet as tolerated.  Will need colonoscopy as outpatient

## 2017-05-04 NOTE — PROGRESS NOTE ADULT - SUBJECTIVE AND OBJECTIVE BOX
HOSPITALIST PROGRESS NOTE    BRAVO KILGORE  [id number]  76yFemale    Patient is a 76y old  Female who presents with a chief complaint of abdominal pain (02 May 2017 15:00)      SUBJECTIVE:   Chart reviewed since last visit.  Patient seen and examined at bedside for sigmoid diverticulitis.  Feeling much better - still having some diarrhea, especially after drinking OJ this morning.  Pain improved a lot. Mild nausea, no vomiting  Denies chills, dyspnea, chest pain, palpitations, cough, chills or fevers.      OBJECTIVE:  Vital Signs Last 24 Hrs  T(C): 36.8, Max: 37 (05-04 @ 00:02)  T(F): 98.2, Max: 98.6 (05-04 @ 00:02)  HR: 69 (68 - 72)  BP: 156/77 (130/78 - 181/75)  RR: 18 (18 - 18)  SpO2: 97% (97% - 100%)    PHYSICAL EXAMINATION  General: NAD[+]   nontoxic[+]   Obese[+]  HEENT: AT/NC[+]   EOMI[+]   Moist oral mucosa[+]    NECK: Supple[+]  JVD[] Carotid bruit[]  CVS: RRR[+]  S1+S2[+]   Murmur[-]  RESP: Fair air entry bilaterally[+]   Clear sounds[+]  wheeze[-]   Crackles[-]  GI: Soft[+]  Nondistended[+]   Mild LLQ tender[+]   Bowel Sounds[+]    : suprapubic tenderness[+/-]   CVA Tenderness[-]   King[-]  MS: FROM[+]   Edema[-]  CNS: AAOx3[+]    Grossly intact  INTEG: Skin is Warm[+]  dry[+]   PSYCH: Fair Mood, Affect          I&O's Summary  I & Os for 24h ending 04 May 2017 07:00  =============================================  IN: 1025 ml / OUT: 0 ml / NET: 1025 ml    I & Os for current day (as of 04 May 2017 12:44)  =============================================  IN: 100 ml / OUT: 1 ml / NET: 99 ml                          11.0   5.3   )-----------( 277      ( 04 May 2017 07:53 )             32.3       05-04    141  |  108<H>  |  8.0  ----------------------------<  92  3.8   |  19.0<L>  |  0.49<L>    Ca    8.3<L>      04 May 2017 07:52  Mg     1.8     05-04              Culture: Pending      RADIOLOGY   EXAM:  CT ABDOMEN AND PELVIS OC IC                          PROCEDURE DATE:  05/02/2017    Findings: No pleural fluid in the chest or ascites in the abdomen.   Visualized portions of the lower lungs are clear..   The liver and spleen   appear normal in size, shape and density..  There are no focal masses or   cysts.   The gallbladder is normal in size and density. .   There is no   evidenceof intrahepatic biliary dilatation. The pancreas and adrenal   glands are normal. There is no evidence of retroperitoneal   lymphadenopathy. The kidneys are normal in size,  shape and density.   There is no evidence of obstructive uropathy or renal mass. Subcentimeter   cysts in midpole left kidney. Both kidneys are excreting contrast   normally. The aorta is normal in caliber and contour. No evidence of an   aneurysm.    Diverticula disease and intramural wall thickening is present in the   sigmoid consistent with diverticulitis. No evidence of perforation or   abscess..  There are no extracolonic fluid collections or inflammatory   changes.     Bone window examination demonstrates grade 2 spondylolisthesis at L4-5   with disc narrowing and central stenosis. Disc narrowing and degenerative   changes L5-S1.     The uterus is normal in size, shape and density.. Subcentimeter fibroid   posterior fundus of uterus. There are no pelvic masses or cysts in the   adnexa.  No free fluid in the cul-de-sac. No evidence of pelvic   lymphadenopathy. Urinary bladder is normal in wall thickness, contour and   density. Pelvic musculature appears symmetrical.    Impression: Sigmoid diverticulitis.    Left renal cysts    Right 2 spondylolisthesis L4-5.    Degenerative disc disease L4-5 and L5-S1                RADHA STONE M.D., ATTENDING RADIOLOGIST  This document has been electronically signed. May  2 2017 11:40AM      MEDICATIONS  (STANDING):  sodium chloride 0.9% lock flush 3milliLiter(s) IV Push every 8 hours  metroNIDAZOLE  IVPB 500milliGRAM(s) IV Intermittent every 8 hours  cefTRIAXone   IVPB  IV Intermittent   sodium chloride 0.45% with potassium chloride 20 mEq/L 1000milliLiter(s) IV Continuous <Continuous>  enoxaparin Injectable 40milliGRAM(s) SubCutaneous every 24 hours  cefTRIAXone   IVPB 1Gram(s) IV Intermittent every 24 hours  pantoprazole  Injectable 40milliGRAM(s) IV Push daily      MEDICATIONS  (PRN):  ondansetron Injectable 4milliGRAM(s) IV Push every 8 hours PRN Nausea and/or Vomiting  morphine  - Injectable 2milliGRAM(s) IV Push every 4 hours PRN Severe Pain (7 - 10)  acetaminophen   Tablet 650milliGRAM(s) Oral every 6 hours PRN For Temp greater than 38 C (100.4 F)

## 2017-05-05 VITALS
SYSTOLIC BLOOD PRESSURE: 144 MMHG | OXYGEN SATURATION: 100 % | HEART RATE: 63 BPM | DIASTOLIC BLOOD PRESSURE: 76 MMHG | TEMPERATURE: 99 F | RESPIRATION RATE: 18 BRPM

## 2017-05-05 LAB
ANION GAP SERPL CALC-SCNC: 15 MMOL/L — SIGNIFICANT CHANGE UP (ref 5–17)
BUN SERPL-MCNC: 7 MG/DL — LOW (ref 8–20)
CALCIUM SERPL-MCNC: 8.3 MG/DL — LOW (ref 8.6–10.2)
CHLORIDE SERPL-SCNC: 108 MMOL/L — HIGH (ref 98–107)
CO2 SERPL-SCNC: 19 MMOL/L — LOW (ref 22–29)
CREAT SERPL-MCNC: 0.53 MG/DL — SIGNIFICANT CHANGE UP (ref 0.5–1.3)
GLUCOSE SERPL-MCNC: 86 MG/DL — SIGNIFICANT CHANGE UP (ref 70–115)
MAGNESIUM SERPL-MCNC: 1.7 MG/DL — LOW (ref 1.8–2.5)
POTASSIUM SERPL-MCNC: 3.9 MMOL/L — SIGNIFICANT CHANGE UP (ref 3.5–5.3)
POTASSIUM SERPL-SCNC: 3.9 MMOL/L — SIGNIFICANT CHANGE UP (ref 3.5–5.3)
SODIUM SERPL-SCNC: 142 MMOL/L — SIGNIFICANT CHANGE UP (ref 135–145)

## 2017-05-05 PROCEDURE — 86900 BLOOD TYPING SEROLOGIC ABO: CPT

## 2017-05-05 PROCEDURE — 81001 URINALYSIS AUTO W/SCOPE: CPT

## 2017-05-05 PROCEDURE — 86850 RBC ANTIBODY SCREEN: CPT

## 2017-05-05 PROCEDURE — 80053 COMPREHEN METABOLIC PANEL: CPT

## 2017-05-05 PROCEDURE — 84484 ASSAY OF TROPONIN QUANT: CPT

## 2017-05-05 PROCEDURE — 36415 COLL VENOUS BLD VENIPUNCTURE: CPT

## 2017-05-05 PROCEDURE — 83735 ASSAY OF MAGNESIUM: CPT

## 2017-05-05 PROCEDURE — 85027 COMPLETE CBC AUTOMATED: CPT

## 2017-05-05 PROCEDURE — 99239 HOSP IP/OBS DSCHRG MGMT >30: CPT

## 2017-05-05 PROCEDURE — 85610 PROTHROMBIN TIME: CPT

## 2017-05-05 PROCEDURE — 86901 BLOOD TYPING SEROLOGIC RH(D): CPT

## 2017-05-05 PROCEDURE — 74177 CT ABD & PELVIS W/CONTRAST: CPT

## 2017-05-05 PROCEDURE — 99285 EMERGENCY DEPT VISIT HI MDM: CPT | Mod: 25

## 2017-05-05 PROCEDURE — 83690 ASSAY OF LIPASE: CPT

## 2017-05-05 PROCEDURE — 80048 BASIC METABOLIC PNL TOTAL CA: CPT

## 2017-05-05 PROCEDURE — 85730 THROMBOPLASTIN TIME PARTIAL: CPT

## 2017-05-05 PROCEDURE — 87040 BLOOD CULTURE FOR BACTERIA: CPT

## 2017-05-05 RX ORDER — CIPROFLOXACIN LACTATE 400MG/40ML
1 VIAL (ML) INTRAVENOUS
Qty: 0 | Refills: 0 | COMMUNITY
Start: 2017-05-05

## 2017-05-05 RX ORDER — MAGNESIUM OXIDE 400 MG ORAL TABLET 241.3 MG
1 TABLET ORAL
Qty: 0 | Refills: 0 | COMMUNITY
Start: 2017-05-05

## 2017-05-05 RX ORDER — CIPROFLOXACIN LACTATE 400MG/40ML
500 VIAL (ML) INTRAVENOUS EVERY 12 HOURS
Qty: 0 | Refills: 0 | Status: DISCONTINUED | OUTPATIENT
Start: 2017-05-05 | End: 2017-05-05

## 2017-05-05 RX ORDER — ACETAMINOPHEN 500 MG
2 TABLET ORAL
Qty: 0 | Refills: 0 | COMMUNITY
Start: 2017-05-05

## 2017-05-05 RX ORDER — MAGNESIUM OXIDE 400 MG ORAL TABLET 241.3 MG
400 TABLET ORAL
Qty: 0 | Refills: 0 | Status: DISCONTINUED | OUTPATIENT
Start: 2017-05-05 | End: 2017-05-05

## 2017-05-05 RX ORDER — AMLODIPINE BESYLATE 2.5 MG/1
1 TABLET ORAL
Qty: 30 | Refills: 0 | OUTPATIENT
Start: 2017-05-05

## 2017-05-05 RX ORDER — METRONIDAZOLE 500 MG
500 TABLET ORAL EVERY 8 HOURS
Qty: 0 | Refills: 0 | Status: DISCONTINUED | OUTPATIENT
Start: 2017-05-05 | End: 2017-05-05

## 2017-05-05 RX ORDER — METRONIDAZOLE 500 MG
1 TABLET ORAL
Qty: 0 | Refills: 0 | COMMUNITY
Start: 2017-05-05

## 2017-05-05 RX ADMIN — SODIUM CHLORIDE 3 MILLILITER(S): 9 INJECTION INTRAMUSCULAR; INTRAVENOUS; SUBCUTANEOUS at 13:27

## 2017-05-05 RX ADMIN — SODIUM CHLORIDE 3 MILLILITER(S): 9 INJECTION INTRAMUSCULAR; INTRAVENOUS; SUBCUTANEOUS at 05:17

## 2017-05-05 RX ADMIN — AMLODIPINE BESYLATE 2.5 MILLIGRAM(S): 2.5 TABLET ORAL at 06:04

## 2017-05-05 RX ADMIN — Medication 500 MILLIGRAM(S): at 17:12

## 2017-05-05 RX ADMIN — Medication 100 MILLIGRAM(S): at 13:52

## 2017-05-05 RX ADMIN — PANTOPRAZOLE SODIUM 40 MILLIGRAM(S): 20 TABLET, DELAYED RELEASE ORAL at 12:14

## 2017-05-05 RX ADMIN — Medication 100 MILLIGRAM(S): at 06:04

## 2017-05-05 RX ADMIN — MAGNESIUM OXIDE 400 MG ORAL TABLET 400 MILLIGRAM(S): 241.3 TABLET ORAL at 17:12

## 2017-05-05 RX ADMIN — CEFTRIAXONE 100 GRAM(S): 500 INJECTION, POWDER, FOR SOLUTION INTRAMUSCULAR; INTRAVENOUS at 12:14

## 2017-05-05 NOTE — DISCHARGE NOTE ADULT - CARE PROVIDER_API CALL
Joe Crawley), Gastroenterology  [address] [facility] [place] [address]  Phone: [phone]  Fax: [phone]    Jw Delgadillo), Family Medicine  [address] [place] [address]  Phone: [phone]  Fax: [phone]

## 2017-05-05 NOTE — DISCHARGE NOTE ADULT - PLAN OF CARE
resolution Diet and medications as prescribed.  Follow up with GI. Diet and mediations as prescribed.  Follow up with PMD Medications as prescribed  Follow up with PMD

## 2017-05-05 NOTE — DISCHARGE NOTE ADULT - HOSPITAL COURSE
76 year old female with PMHx Diverticulitis presents with lower abdominal pain and diarrhea after having failed oral antibiotic therapy. Leukocytosis at admission, CT abdomen confirms sigmoid diverticulitis. She was started on IV antibiotics and liquid diet with improvement in pain and resolution of leukocytosis. Remained afebrile, still continues to have diarrhea. Cultures negative  She did have some hypomagnesemia which was replaced.  Noted to be hypertensive, likely secondary to pain, IVF - started on Amlodipine.    Patient was seen and examined on the day of discharge. Denies any fever, chills, abdominal pain or diarrhea. Still having diarrhea. VSS. Examination unremarkable.  Patient tolerating low residue diet.    Medically stable for discharge.    Discharge time - 32 minutes

## 2017-05-05 NOTE — PROGRESS NOTE ADULT - SUBJECTIVE AND OBJECTIVE BOX
INTERVAL HPI/OVERNIGHT EVENTS:  Patient seen and examined    MEDICATIONS  (STANDING):  sodium chloride 0.9% lock flush 3milliLiter(s) IV Push every 8 hours  metroNIDAZOLE  IVPB 500milliGRAM(s) IV Intermittent every 8 hours  cefTRIAXone   IVPB  IV Intermittent   sodium chloride 0.45% with potassium chloride 20 mEq/L 1000milliLiter(s) IV Continuous <Continuous>  enoxaparin Injectable 40milliGRAM(s) SubCutaneous every 24 hours  cefTRIAXone   IVPB 1Gram(s) IV Intermittent every 24 hours  pantoprazole  Injectable 40milliGRAM(s) IV Push daily  amLODIPine   Tablet 2.5milliGRAM(s) Oral daily    MEDICATIONS  (PRN):  ondansetron Injectable 4milliGRAM(s) IV Push every 8 hours PRN Nausea and/or Vomiting  morphine  - Injectable 2milliGRAM(s) IV Push every 4 hours PRN Severe Pain (7 - 10)  acetaminophen   Tablet 650milliGRAM(s) Oral every 6 hours PRN For Temp greater than 38 C (100.4 F)  hydrALAZINE 10milliGRAM(s) Oral every 6 hours PRN Systolic/Diastolic >90/60      Allergies    penicillin (Unknown)    Intolerances        Vital Signs Last 24 Hrs  T(C): 36.8, Max: 36.8 (05-04 @ 23:45)  T(F): 98.3, Max: 98.3 (05-04 @ 23:45)  HR: 64 (64 - 69)  BP: 131/67 (131/67 - 157/65)  BP(mean): --  RR: 18 (18 - 18)  SpO2: 98% (98% - 98%)    PHYSICAL EXAM:  General: NAD.  CVS: S1, S2  Chest: air entry bilaterally present  Abd: BS present, soft, non-tender      LABS:                        11.0   5.3   )-----------( 277      ( 04 May 2017 07:53 )             32.3     05-05    142  |  108<H>  |  7.0<L>  ----------------------------<  86  3.9   |  19.0<L>  |  0.53    Ca    8.3<L>      05 May 2017 08:13  Mg     1.7     05-05

## 2017-05-05 NOTE — DISCHARGE NOTE ADULT - CARE PLAN
Principal Discharge DX:	Sigmoid diverticulitis  Goal:	resolution  Instructions for follow-up, activity and diet:	Diet and medications as prescribed.  Follow up with GI.  Secondary Diagnosis:	Other secondary hypertension  Instructions for follow-up, activity and diet:	Diet and mediations as prescribed.  Follow up with PMD  Secondary Diagnosis:	Hypomagnesemia  Instructions for follow-up, activity and diet:	Medications as prescribed  Follow up with PMD

## 2017-05-05 NOTE — DISCHARGE NOTE ADULT - MEDICATION SUMMARY - MEDICATIONS TO TAKE
I will START or STAY ON the medications listed below when I get home from the hospital:    metroNIDAZOLE 500 mg oral tablet  -- 1 tab(s) by mouth every 8 hours for 7 days    -- Indication: For Diverticulitis    acetaminophen 325 mg oral tablet  -- 2 tab(s) by mouth every 6 hours, As needed, For Temp greater than 38 C (100.4 F)  -- Indication: For fever or pain as needed    amLODIPine 2.5 mg oral tablet  -- 1 tab(s) by mouth once a day  -- Indication: For Hypertension    magnesium oxide 400 mg (241.3 mg elemental magnesium) oral tablet  -- 1 tab(s) by mouth 3 times a day (with meals)  -- Indication: For Hypomagnesemia    ciprofloxacin 500 mg oral tablet  -- 1 tab(s) by mouth every 12 hours for 7 days  -- Indication: For Diverticulitis

## 2017-05-05 NOTE — DISCHARGE NOTE ADULT - PATIENT PORTAL LINK FT
“You can access the [facility] Patient Portal, offered by [facility], by registering with the following website: [email or web address]”

## 2017-05-07 LAB
CULTURE RESULTS: SIGNIFICANT CHANGE UP
CULTURE RESULTS: SIGNIFICANT CHANGE UP
SPECIMEN SOURCE: SIGNIFICANT CHANGE UP
SPECIMEN SOURCE: SIGNIFICANT CHANGE UP

## 2018-06-24 ENCOUNTER — INPATIENT (INPATIENT)
Facility: HOSPITAL | Age: 78
LOS: 1 days | Discharge: HOME | DRG: 310 | End: 2018-06-26
Attending: INTERNAL MEDICINE | Admitting: FAMILY MEDICINE
Payer: COMMERCIAL

## 2018-06-24 ENCOUNTER — TRANSCRIPTION ENCOUNTER (OUTPATIENT)
Age: 78
End: 2018-06-24

## 2018-06-24 VITALS
SYSTOLIC BLOOD PRESSURE: 173 MMHG | TEMPERATURE: 98 F | DIASTOLIC BLOOD PRESSURE: 81 MMHG | OXYGEN SATURATION: 100 % | HEART RATE: 98 BPM | RESPIRATION RATE: 20 BRPM

## 2018-06-24 DIAGNOSIS — R73.9 HYPERGLYCEMIA, UNSPECIFIED: ICD-10-CM

## 2018-06-24 DIAGNOSIS — H81.90 UNSPECIFIED DISORDER OF VESTIBULAR FUNCTION, UNSPECIFIED EAR: ICD-10-CM

## 2018-06-24 DIAGNOSIS — I48.91 UNSPECIFIED ATRIAL FIBRILLATION: ICD-10-CM

## 2018-06-24 DIAGNOSIS — I10 ESSENTIAL (PRIMARY) HYPERTENSION: ICD-10-CM

## 2018-06-24 DIAGNOSIS — R27.0 ATAXIA, UNSPECIFIED: ICD-10-CM

## 2018-06-24 DIAGNOSIS — E03.9 HYPOTHYROIDISM, UNSPECIFIED: ICD-10-CM

## 2018-06-24 PROBLEM — K57.92 DIVERTICULITIS OF INTESTINE, PART UNSPECIFIED, WITHOUT PERFORATION OR ABSCESS WITHOUT BLEEDING: Chronic | Status: ACTIVE | Noted: 2017-05-02

## 2018-06-24 LAB
ALBUMIN SERPL ELPH-MCNC: 3.6 G/DL — SIGNIFICANT CHANGE UP (ref 3.3–5.2)
ALBUMIN SERPL ELPH-MCNC: 4 G/DL — SIGNIFICANT CHANGE UP (ref 3.3–5.2)
ALP SERPL-CCNC: 80 U/L — SIGNIFICANT CHANGE UP (ref 40–120)
ALP SERPL-CCNC: 89 U/L — SIGNIFICANT CHANGE UP (ref 40–120)
ALT FLD-CCNC: 15 U/L — SIGNIFICANT CHANGE UP
ALT FLD-CCNC: 17 U/L — SIGNIFICANT CHANGE UP
ANION GAP SERPL CALC-SCNC: 15 MMOL/L — SIGNIFICANT CHANGE UP (ref 5–17)
ANION GAP SERPL CALC-SCNC: 17 MMOL/L — SIGNIFICANT CHANGE UP (ref 5–17)
APPEARANCE UR: CLEAR — SIGNIFICANT CHANGE UP
APTT BLD: 27.3 SEC — LOW (ref 27.5–37.4)
APTT BLD: 31.6 SEC — SIGNIFICANT CHANGE UP (ref 27.5–37.4)
AST SERPL-CCNC: 17 U/L — SIGNIFICANT CHANGE UP
AST SERPL-CCNC: 24 U/L — SIGNIFICANT CHANGE UP
BACTERIA # UR AUTO: ABNORMAL
BASOPHILS # BLD AUTO: 0 K/UL — SIGNIFICANT CHANGE UP (ref 0–0.2)
BASOPHILS # BLD AUTO: 0 K/UL — SIGNIFICANT CHANGE UP (ref 0–0.2)
BASOPHILS NFR BLD AUTO: 0.1 % — SIGNIFICANT CHANGE UP (ref 0–2)
BASOPHILS NFR BLD AUTO: 0.2 % — SIGNIFICANT CHANGE UP (ref 0–2)
BILIRUB SERPL-MCNC: 0.4 MG/DL — SIGNIFICANT CHANGE UP (ref 0.4–2)
BILIRUB SERPL-MCNC: 0.5 MG/DL — SIGNIFICANT CHANGE UP (ref 0.4–2)
BILIRUB UR-MCNC: NEGATIVE — SIGNIFICANT CHANGE UP
BUN SERPL-MCNC: 13 MG/DL — SIGNIFICANT CHANGE UP (ref 8–20)
BUN SERPL-MCNC: 15 MG/DL — SIGNIFICANT CHANGE UP (ref 8–20)
CALCIUM SERPL-MCNC: 9 MG/DL — SIGNIFICANT CHANGE UP (ref 8.6–10.2)
CALCIUM SERPL-MCNC: 9.4 MG/DL — SIGNIFICANT CHANGE UP (ref 8.6–10.2)
CHLORIDE SERPL-SCNC: 105 MMOL/L — SIGNIFICANT CHANGE UP (ref 98–107)
CHLORIDE SERPL-SCNC: 105 MMOL/L — SIGNIFICANT CHANGE UP (ref 98–107)
CO2 SERPL-SCNC: 19 MMOL/L — LOW (ref 22–29)
CO2 SERPL-SCNC: 20 MMOL/L — LOW (ref 22–29)
COLOR SPEC: YELLOW — SIGNIFICANT CHANGE UP
CREAT SERPL-MCNC: 0.51 MG/DL — SIGNIFICANT CHANGE UP (ref 0.5–1.3)
CREAT SERPL-MCNC: 0.59 MG/DL — SIGNIFICANT CHANGE UP (ref 0.5–1.3)
DIFF PNL FLD: ABNORMAL
EOSINOPHIL # BLD AUTO: 0 K/UL — SIGNIFICANT CHANGE UP (ref 0–0.5)
EOSINOPHIL # BLD AUTO: 0 K/UL — SIGNIFICANT CHANGE UP (ref 0–0.5)
EOSINOPHIL NFR BLD AUTO: 0.1 % — SIGNIFICANT CHANGE UP (ref 0–6)
EOSINOPHIL NFR BLD AUTO: 0.1 % — SIGNIFICANT CHANGE UP (ref 0–6)
EPI CELLS # UR: SIGNIFICANT CHANGE UP
GLUCOSE SERPL-MCNC: 121 MG/DL — HIGH (ref 70–115)
GLUCOSE SERPL-MCNC: 129 MG/DL — HIGH (ref 70–115)
GLUCOSE UR QL: NEGATIVE MG/DL — SIGNIFICANT CHANGE UP
HBA1C BLD-MCNC: 5.3 % — SIGNIFICANT CHANGE UP (ref 4–5.6)
HCT VFR BLD CALC: 39.1 % — SIGNIFICANT CHANGE UP (ref 37–47)
HCT VFR BLD CALC: 41.4 % — SIGNIFICANT CHANGE UP (ref 37–47)
HGB BLD-MCNC: 13.1 G/DL — SIGNIFICANT CHANGE UP (ref 12–16)
HGB BLD-MCNC: 13.7 G/DL — SIGNIFICANT CHANGE UP (ref 12–16)
INR BLD: 0.99 RATIO — SIGNIFICANT CHANGE UP (ref 0.88–1.16)
INR BLD: 1.07 RATIO — SIGNIFICANT CHANGE UP (ref 0.88–1.16)
KETONES UR-MCNC: NEGATIVE — SIGNIFICANT CHANGE UP
LACTATE BLDV-MCNC: 1 MMOL/L — SIGNIFICANT CHANGE UP (ref 0.5–2)
LEUKOCYTE ESTERASE UR-ACNC: ABNORMAL
LIDOCAIN IGE QN: 18 U/L — LOW (ref 22–51)
LYMPHOCYTES # BLD AUTO: 1.4 K/UL — SIGNIFICANT CHANGE UP (ref 1–4.8)
LYMPHOCYTES # BLD AUTO: 15.2 % — LOW (ref 20–55)
LYMPHOCYTES # BLD AUTO: 19.3 % — LOW (ref 20–55)
LYMPHOCYTES # BLD AUTO: 2 K/UL — SIGNIFICANT CHANGE UP (ref 1–4.8)
MAGNESIUM SERPL-MCNC: 2 MG/DL — SIGNIFICANT CHANGE UP (ref 1.6–2.6)
MCHC RBC-ENTMCNC: 30 PG — SIGNIFICANT CHANGE UP (ref 27–31)
MCHC RBC-ENTMCNC: 30.2 PG — SIGNIFICANT CHANGE UP (ref 27–31)
MCHC RBC-ENTMCNC: 33.1 G/DL — SIGNIFICANT CHANGE UP (ref 32–36)
MCHC RBC-ENTMCNC: 33.5 G/DL — SIGNIFICANT CHANGE UP (ref 32–36)
MCV RBC AUTO: 89.7 FL — SIGNIFICANT CHANGE UP (ref 81–99)
MCV RBC AUTO: 91.2 FL — SIGNIFICANT CHANGE UP (ref 81–99)
MONOCYTES # BLD AUTO: 0.4 K/UL — SIGNIFICANT CHANGE UP (ref 0–0.8)
MONOCYTES # BLD AUTO: 0.5 K/UL — SIGNIFICANT CHANGE UP (ref 0–0.8)
MONOCYTES NFR BLD AUTO: 3.9 % — SIGNIFICANT CHANGE UP (ref 3–10)
MONOCYTES NFR BLD AUTO: 5.6 % — SIGNIFICANT CHANGE UP (ref 3–10)
NEUTROPHILS # BLD AUTO: 7 K/UL — SIGNIFICANT CHANGE UP (ref 1.8–8)
NEUTROPHILS # BLD AUTO: 7.8 K/UL — SIGNIFICANT CHANGE UP (ref 1.8–8)
NEUTROPHILS NFR BLD AUTO: 76.1 % — HIGH (ref 37–73)
NEUTROPHILS NFR BLD AUTO: 78.9 % — HIGH (ref 37–73)
NITRITE UR-MCNC: NEGATIVE — SIGNIFICANT CHANGE UP
PH UR: 7 — SIGNIFICANT CHANGE UP (ref 5–8)
PLATELET # BLD AUTO: 321 K/UL — SIGNIFICANT CHANGE UP (ref 150–400)
PLATELET # BLD AUTO: 337 K/UL — SIGNIFICANT CHANGE UP (ref 150–400)
POTASSIUM SERPL-MCNC: 3.9 MMOL/L — SIGNIFICANT CHANGE UP (ref 3.5–5.3)
POTASSIUM SERPL-MCNC: 4.3 MMOL/L — SIGNIFICANT CHANGE UP (ref 3.5–5.3)
POTASSIUM SERPL-SCNC: 3.9 MMOL/L — SIGNIFICANT CHANGE UP (ref 3.5–5.3)
POTASSIUM SERPL-SCNC: 4.3 MMOL/L — SIGNIFICANT CHANGE UP (ref 3.5–5.3)
PROT SERPL-MCNC: 6.9 G/DL — SIGNIFICANT CHANGE UP (ref 6.6–8.7)
PROT SERPL-MCNC: 7.5 G/DL — SIGNIFICANT CHANGE UP (ref 6.6–8.7)
PROT UR-MCNC: 30 MG/DL
PROTHROM AB SERPL-ACNC: 10.9 SEC — SIGNIFICANT CHANGE UP (ref 9.8–12.7)
PROTHROM AB SERPL-ACNC: 11.8 SEC — SIGNIFICANT CHANGE UP (ref 9.8–12.7)
RBC # BLD: 4.36 M/UL — LOW (ref 4.4–5.2)
RBC # BLD: 4.54 M/UL — SIGNIFICANT CHANGE UP (ref 4.4–5.2)
RBC # FLD: 12.9 % — SIGNIFICANT CHANGE UP (ref 11–15.6)
RBC # FLD: 12.9 % — SIGNIFICANT CHANGE UP (ref 11–15.6)
RBC CASTS # UR COMP ASSIST: SIGNIFICANT CHANGE UP /HPF (ref 0–4)
SODIUM SERPL-SCNC: 140 MMOL/L — SIGNIFICANT CHANGE UP (ref 135–145)
SODIUM SERPL-SCNC: 141 MMOL/L — SIGNIFICANT CHANGE UP (ref 135–145)
SP GR SPEC: 1.01 — SIGNIFICANT CHANGE UP (ref 1.01–1.02)
T3 SERPL-MCNC: 121 NG/DL — SIGNIFICANT CHANGE UP (ref 80–200)
T4 AB SER-ACNC: 6.7 UG/DL — SIGNIFICANT CHANGE UP (ref 4.5–12)
TROPONIN T SERPL-MCNC: <0.01 NG/ML — SIGNIFICANT CHANGE UP (ref 0–0.06)
TROPONIN T SERPL-MCNC: <0.01 NG/ML — SIGNIFICANT CHANGE UP (ref 0–0.06)
TSH SERPL-MCNC: 5.74 UIU/ML — HIGH (ref 0.27–4.2)
TSH SERPL-MCNC: 9.9 UIU/ML — HIGH (ref 0.27–4.2)
UROBILINOGEN FLD QL: NEGATIVE MG/DL — SIGNIFICANT CHANGE UP
WBC # BLD: 10.2 K/UL — SIGNIFICANT CHANGE UP (ref 4.8–10.8)
WBC # BLD: 8.9 K/UL — SIGNIFICANT CHANGE UP (ref 4.8–10.8)
WBC # FLD AUTO: 10.2 K/UL — SIGNIFICANT CHANGE UP (ref 4.8–10.8)
WBC # FLD AUTO: 8.9 K/UL — SIGNIFICANT CHANGE UP (ref 4.8–10.8)
WBC UR QL: SIGNIFICANT CHANGE UP

## 2018-06-24 PROCEDURE — 99223 1ST HOSP IP/OBS HIGH 75: CPT

## 2018-06-24 PROCEDURE — 12345: CPT | Mod: NC

## 2018-06-24 PROCEDURE — 71045 X-RAY EXAM CHEST 1 VIEW: CPT | Mod: 26

## 2018-06-24 PROCEDURE — 99285 EMERGENCY DEPT VISIT HI MDM: CPT

## 2018-06-24 PROCEDURE — 93010 ELECTROCARDIOGRAM REPORT: CPT

## 2018-06-24 PROCEDURE — 70450 CT HEAD/BRAIN W/O DYE: CPT | Mod: 26

## 2018-06-24 RX ORDER — MECLIZINE HCL 12.5 MG
50 TABLET ORAL ONCE
Qty: 0 | Refills: 0 | Status: COMPLETED | OUTPATIENT
Start: 2018-06-24 | End: 2018-06-24

## 2018-06-24 RX ORDER — PANTOPRAZOLE SODIUM 20 MG/1
40 TABLET, DELAYED RELEASE ORAL
Qty: 0 | Refills: 0 | Status: DISCONTINUED | OUTPATIENT
Start: 2018-06-24 | End: 2018-06-26

## 2018-06-24 RX ORDER — ASPIRIN/CALCIUM CARB/MAGNESIUM 324 MG
325 TABLET ORAL ONCE
Qty: 0 | Refills: 0 | Status: COMPLETED | OUTPATIENT
Start: 2018-06-24 | End: 2018-06-24

## 2018-06-24 RX ORDER — ONDANSETRON 8 MG/1
4 TABLET, FILM COATED ORAL ONCE
Qty: 0 | Refills: 0 | Status: COMPLETED | OUTPATIENT
Start: 2018-06-24 | End: 2018-06-24

## 2018-06-24 RX ORDER — ENOXAPARIN SODIUM 100 MG/ML
70 INJECTION SUBCUTANEOUS EVERY 12 HOURS
Qty: 0 | Refills: 0 | Status: DISCONTINUED | OUTPATIENT
Start: 2018-06-24 | End: 2018-06-26

## 2018-06-24 RX ORDER — ATORVASTATIN CALCIUM 80 MG/1
20 TABLET, FILM COATED ORAL AT BEDTIME
Qty: 0 | Refills: 0 | Status: DISCONTINUED | OUTPATIENT
Start: 2018-06-24 | End: 2018-06-26

## 2018-06-24 RX ORDER — PANTOPRAZOLE SODIUM 20 MG/1
40 TABLET, DELAYED RELEASE ORAL ONCE
Qty: 0 | Refills: 0 | Status: COMPLETED | OUTPATIENT
Start: 2018-06-24 | End: 2018-06-24

## 2018-06-24 RX ORDER — METOPROLOL TARTRATE 50 MG
5 TABLET ORAL EVERY 6 HOURS
Qty: 0 | Refills: 0 | Status: DISCONTINUED | OUTPATIENT
Start: 2018-06-24 | End: 2018-06-26

## 2018-06-24 RX ORDER — ASPIRIN/CALCIUM CARB/MAGNESIUM 324 MG
81 TABLET ORAL DAILY
Qty: 0 | Refills: 0 | Status: DISCONTINUED | OUTPATIENT
Start: 2018-06-24 | End: 2018-06-26

## 2018-06-24 RX ORDER — METOPROLOL TARTRATE 50 MG
25 TABLET ORAL ONCE
Qty: 0 | Refills: 0 | Status: COMPLETED | OUTPATIENT
Start: 2018-06-24 | End: 2018-06-24

## 2018-06-24 RX ORDER — MECLIZINE HCL 12.5 MG
25 TABLET ORAL
Qty: 0 | Refills: 0 | Status: DISCONTINUED | OUTPATIENT
Start: 2018-06-24 | End: 2018-06-26

## 2018-06-24 RX ORDER — ONDANSETRON 8 MG/1
4 TABLET, FILM COATED ORAL ONCE
Qty: 0 | Refills: 0 | Status: DISCONTINUED | OUTPATIENT
Start: 2018-06-24 | End: 2018-06-26

## 2018-06-24 RX ORDER — METOCLOPRAMIDE HCL 10 MG
10 TABLET ORAL ONCE
Qty: 0 | Refills: 0 | Status: COMPLETED | OUTPATIENT
Start: 2018-06-24 | End: 2018-06-24

## 2018-06-24 RX ORDER — SODIUM CHLORIDE 9 MG/ML
1000 INJECTION INTRAMUSCULAR; INTRAVENOUS; SUBCUTANEOUS
Qty: 0 | Refills: 0 | Status: DISCONTINUED | OUTPATIENT
Start: 2018-06-24 | End: 2018-06-25

## 2018-06-24 RX ORDER — METOPROLOL TARTRATE 50 MG
5 TABLET ORAL ONCE
Qty: 0 | Refills: 0 | Status: COMPLETED | OUTPATIENT
Start: 2018-06-24 | End: 2018-06-24

## 2018-06-24 RX ADMIN — Medication 104 MILLIGRAM(S): at 02:45

## 2018-06-24 RX ADMIN — PANTOPRAZOLE SODIUM 40 MILLIGRAM(S): 20 TABLET, DELAYED RELEASE ORAL at 02:45

## 2018-06-24 RX ADMIN — Medication 25 MILLIGRAM(S): at 19:04

## 2018-06-24 RX ADMIN — Medication 5 MILLIGRAM(S): at 03:20

## 2018-06-24 RX ADMIN — ENOXAPARIN SODIUM 70 MILLIGRAM(S): 100 INJECTION SUBCUTANEOUS at 19:05

## 2018-06-24 RX ADMIN — SODIUM CHLORIDE 100 MILLILITER(S): 9 INJECTION INTRAMUSCULAR; INTRAVENOUS; SUBCUTANEOUS at 04:52

## 2018-06-24 RX ADMIN — Medication 25 MILLIGRAM(S): at 10:45

## 2018-06-24 RX ADMIN — SODIUM CHLORIDE 100 MILLILITER(S): 9 INJECTION INTRAMUSCULAR; INTRAVENOUS; SUBCUTANEOUS at 04:35

## 2018-06-24 RX ADMIN — ONDANSETRON 4 MILLIGRAM(S): 8 TABLET, FILM COATED ORAL at 04:34

## 2018-06-24 RX ADMIN — Medication 81 MILLIGRAM(S): at 11:33

## 2018-06-24 RX ADMIN — PANTOPRAZOLE SODIUM 40 MILLIGRAM(S): 20 TABLET, DELAYED RELEASE ORAL at 06:49

## 2018-06-24 RX ADMIN — Medication 25 MILLIGRAM(S): at 04:35

## 2018-06-24 RX ADMIN — ATORVASTATIN CALCIUM 20 MILLIGRAM(S): 80 TABLET, FILM COATED ORAL at 23:03

## 2018-06-24 RX ADMIN — Medication 50 MILLIGRAM(S): at 04:35

## 2018-06-24 RX ADMIN — Medication 325 MILLIGRAM(S): at 06:49

## 2018-06-24 NOTE — PROGRESS NOTE ADULT - ASSESSMENT
78 yo female admitted with ataxia, vertigo, and n/v in setting of new onset afib RVR concerning for CVA/TIA.     ·  Problem: Atrial fibrillation with RVR.  - Maintain monitored bed.  Rate now controlled.  Cardiology input appreciated and d/w .  TTE, TFTs.  The risks / benefits and alternatives to anticoagulation were discussed with pt.  Per neurology, no contraindication to anticoagulation.  Lovenox started.  ·  Problem: Ataxia.  Plan: Suspect CVA in setting of new onset Afib.  Neurology input appreciated.  MRA brain/neck pending  Continue Neuro checks, meclizine prn for vertigo.   ·  Problem: Acquired hypothyroidism.  Plan: f/u thyroid panel.   ·  Problem: Hyperglycemia.  Plan: f/u HbA1c.   ·  Problem: Essential hypertension.  allow permissive HTN at this time due to concern for CVA.   DVT prophylaxis - on anticoagulation.

## 2018-06-24 NOTE — H&P ADULT - ASSESSMENT
76 yo female admitted with ataxia, vertigo, and n/v in setting of new onset afib RVR concerning for CVA/TIA.

## 2018-06-24 NOTE — DISCHARGE NOTE ADULT - HOSPITAL COURSE
Patient: BRAVO KILGORE [id number]                 Internal Medicine Hospitalist Discharge Note    Chief Complaint: Patient is a 77y old  Female who presents with a chief complaint of dizziness, nausea, vomiting (24 Jun 2018 06:02)    HPI:  76 year old female with PMHx Diverticulitis, HTN, abdominal abcess, who presents today c/o nausea and vomiting associated with unsteady gait, tinnitus, and headache. Pt states she awoke with these sx at 9 AM and have not resolved and have gotten worse. Pt reports two similar episodes in past, doesn't remember when, but states they resolved on own. Pt states she is so dizzy, that when she tries to walk she falls to one side. Pt states room is spinning around her and medication given in ED only slightly helps. Of note, pt found to be in Afib RVR. Pt denies fever, diarrhea, constipation, gu sx, cough, numbness, tingling, loss of motor function, change in speech, visual changes, cp, sob. (24 Jun 2018 06:02)    Interim History: Pt rate controlled in the ED.  Seen by cardiology and neurology, felt vertigo may be due to BPV.  CT Head negative for CVA.  Started on anticoagulation.  MRI Brain showed no infarct.  MRA Head and neck showed no significant occlusion, Possible Left thyroid nodule.     Dizziness now resolved.  No weakness / numbness.  Ambulating freely.  Has returned to NSR.      ·  Problem: Atrial fibrillation with RVR.  - Now in NSR.  Adjusted Metoprolol due to bradycardia. . TTE showing EF 45-50%.  Continue anticoagulation.  Outpatient cardiology followup of mild cardiomyopathy and for medication titration.   ·  Problem: Ataxia.  Plan: Improving.  Likely due to BPPV.  Workup negative for CVA.  D/c neuro checks / stroke protocol.    ·  Problem: Acquired hypothyroidism.  Plan: TFTs appear to be unremarkable.  Offered pt thyroid ultrasound to assess nodule / for malignancy.  She wishes for outpatient followup with PMD, acknowledging above.   ·  Problem: Hyperglycemia.  Plan: f/u HbA1c.   ·  Problem: Essential hypertension. BP stable	      Disposition: stable for discharge.  Outpatient followup as above.  Patient was advised to return if they experience any recurrence or worsening of symptoms.  Total time spent on discharge was 35 minutes.  -Foster Calix D.O., Hospitalist, [facility]

## 2018-06-24 NOTE — ED PROVIDER NOTE - OBJECTIVE STATEMENT
76 y/o female with PMHx diverticulitis presents to the ED accompanied by family for evaluation of n/v, onset 16. Family states pt has been experiencing dizziness today, pt lives by herself. PT reports headache, and tinnitus for the past few days. PT denies fever, chills, and any other acute symptoms and complaints at this time.

## 2018-06-24 NOTE — DISCHARGE NOTE ADULT - CARE PLAN
Principal Discharge DX:	Atrial fibrillation with RVR  Goal:	stable for discharge  Assessment and plan of treatment:	It is important to see your primary physician as well as the physicians noted below within the next week to perform a comprehensive medical review.  Call their offices for an appointment as soon as you leave the hospital.  If you do not have a primary physician, contact the [facility] [place] [phone] located on [address] [place] [address].  Your medical issues appear to be stable at this time, but if your symptoms recur or worsen, contact your physicians and/or return to the hospital if necessary.  If you encounter any issues or questions with your medication, call your physicians before stopping the medication.  Do not drive.  Limit your diet to 2 grams of sodium daily.  Secondary Diagnosis:	Essential hypertension  Assessment and plan of treatment:	Continue current medications.  Secondary Diagnosis:	Vertigo  Assessment and plan of treatment:	Continue current medications.  Secondary Diagnosis:	Thyroid nodule  Assessment and plan of treatment:	see your doctor for further evaluation.

## 2018-06-24 NOTE — H&P ADULT - NEUROLOGICAL DETAILS
sensation intact/cranial nerves intact/responds to pain/normal strength/alert and oriented x 3/responds to verbal commands/no spontaneous movement

## 2018-06-24 NOTE — H&P ADULT - PROBLEM SELECTOR PLAN 1
admit to monitored bed  CT head reviewed  MRA brain/neck pending  TTE  ordered  HbA1c and lipid panel  fall and aspiration precautions  bedside swallow eval   ASA and statin started  lowfat DASH/TLC diet once passed  PT evaluation  Neurochecks q 2 hrs  cbc, cmp, coags for AM  VCD boots for DVT prophylaxis

## 2018-06-24 NOTE — ED PROVIDER NOTE - NS ED ROS FT
no weight change, no fever or chills  no rash, no bruises  no visual changes no eye discharge  +tinnitus, no cough cold or congestion,   no sob, no chest pain  no orthopnea, no pnd  no abd pain, +n/v, no diarrhea   no hematuria, no change in urinary habits  no joint pain, no deformity  +headache, +dizziness, no paresthesia

## 2018-06-24 NOTE — ED PROVIDER NOTE - PROGRESS NOTE DETAILS
Per pt family, pt has not past medical hx. PT ECG noted to have Afib. results disucssed with son, plan admissin, explained  there father had a stroke which was dx late on mri, they would like the mr for mother

## 2018-06-24 NOTE — PATIENT PROFILE ADULT. - ABILITY TO HEAR (WITH HEARING AID OR HEARING APPLIANCE IF NORMALLY USED):
"I was going to get a hearing aid, but I haven't gotten one yet."/Mildly to Moderately Impaired: difficulty hearing in some environments or speaker may need to increase volume or speak distinctly

## 2018-06-24 NOTE — CONSULT NOTE ADULT - ASSESSMENT
77 y/o F with history of diverticulitis,  abdominal abscess with 1 day hx of nausea/vomiting/dizziness, began when she woke up yesterday morning.  Associated with a sensation of the room spinning.  Has been getting similar episodes for the past  couple of years, but usually limited in time, going away within hours.  In addition, also noted to in afib with RVR 140s, with inferolateral ST depression.  No weakness.  Santos-Hallpike maneuver performed at bedside, with acute worsening of symptoms, + nystagmus, positive for vertigo. CT head unremarkable.     # vertigo  # atrial fibrillation with RVR - CHADSVASC at least 3 (age,gender)  # inferolateral ST depression on ECG    - needs to be on anticoagulation, start full dose LMWH, will transition to NOAC on discharge  - may give 5 mg IV lopressor q6 hr prn for sustained HRs > 130s  - ok to continue diltiazem for now.  No evidence for decompensated heart failure on exam  - TTE - best done when no longer having vertigo  - if afib persistent, would consider SIMON DCCV  - eventual ischemia evaluation  - on meclizine  - bedrest   - check TFTs, HbA1c  -D/W patient, Dr. Calix    Will follow.    Thank you for allowing me to participate in your patient's care. 77 y/o F with history of diverticulitis,  abdominal abscess with 1 day hx of nausea/vomiting/dizziness, began when she woke up yesterday morning.  Associated with a sensation of the room spinning.  Has been getting similar episodes for the past  couple of years, but usually limited in time, going away within hours.  In addition, also noted to in afib with RVR 140s, with inferolateral ST depression.  No weakness.  Santos-Hallpike maneuver performed at bedside, with acute worsening of symptoms, + nystagmus, positive for vertigo. CT head unremarkable.     # vertigo  # atrial fibrillation with RVR - CHADSVASC at least 3 (age,gender)  # inferolateral ST depression on ECG    - needs to be on anticoagulation, start full dose LMWH, will transition to NOAC on discharge  - may give 5 mg IV lopressor q6 hr prn for sustained HRs > 130s  - ok to continue diltiazem for now.  No evidence for decompensated heart failure on exam  - TTE - best done when no longer having vertigo  - if afib persistent, would consider SIMON DCCV  - eventual ischemia evaluation  - on meclizine, start antie-emetic, consider low dose benzodiazepine  - bedrest   - check TFTs, HbA1c  -D/W patient, Dr. Calix    Will follow.    Thank you for allowing me to participate in your patient's care. 77 y/o F with history of diverticulitis,  abdominal abscess with 1 day hx of nausea/vomiting/dizziness, began when she woke up yesterday morning.  Associated with a sensation of the room spinning.  Has been getting similar episodes for the past  couple of years, but usually limited in time, going away within hours.  In addition, also noted to in afib with RVR 140s, with inferolateral ST depression.  No weakness.  Santos-Hallpike maneuver performed at bedside, with acute worsening of symptoms, + nystagmus, positive for vertigo. CT head unremarkable.     # vertigo  # atrial fibrillation with RVR - CHADSVASC at least 3 (age,gender)  # inferolateral ST depression on ECG    - needs to be on anticoagulation, start full dose LMWH, will transition to NOAC on discharge  - may give 5 mg IV lopressor q6 hr prn for sustained HRs > 130s  - ok to continue diltiazem for now.  No evidence for decompensated heart failure on exam  - TTE - best done when no longer having vertigo  - if afib persistent, would consider SIMON DCCV  - eventual ischemia evaluation  - on meclizine, start antie-emetic, consider low dose benzodiazepine  - bedrest   - check TFTs, HbA1c  - MRI brain/MRA neck pending  -D/W patient, Dr. Calix    Will follow.    Thank you for allowing me to participate in your patient's care. 77 y/o F with history of diverticulitis,  abdominal abscess with 1 day hx of nausea/vomiting/dizziness, began when she woke up yesterday morning.  Associated with a sensation of the room spinning.  Has been getting similar episodes for the past  couple of years, but usually limited in time, going away within hours.  In addition, also noted to in afib with RVR 140s, with inferolateral ST depression.  No weakness.  Santos-Hallpike maneuver performed at bedside, with acute worsening of symptoms, + nystagmus, positive for vertigo. CT head unremarkable.     # vertigo  # atrial fibrillation with RVR - CHADSVASC at least 3 (age,gender)  # inferolateral ST depression on ECG    - if no MRA negative, needs to be on anticoagulation, start full dose LMWH, will transition to NOAC on discharge  - may give 5 mg IV lopressor q6 hr prn for sustained HRs > 130s  - ok to continue diltiazem for now.  No evidence for decompensated heart failure on exam  - TTE - best done when no longer having vertigo  - if afib persistent, would consider SIMON DCCV  - eventual ischemia evaluation  - on meclizine, start antie-emetic, consider low dose benzodiazepine  - bedrest   - check TFTs, HbA1c  - MRI brain/MRA neck pending  -D/W patient, Dr. Calix    Will follow.    Thank you for allowing me to participate in your patient's care.

## 2018-06-24 NOTE — ED ADULT NURSE REASSESSMENT NOTE - NS ED NURSE REASSESS COMMENT FT1
patient a&ox3. patient denies pain or discomfort. patient c/o dizziness. pharmacy called to see if ok to give meclizine again. ok to give. awaiting bed. will continue to monitor.
report given to ROSEANN Narvaez
pt in no apparent distress, resting comfortably, denies any pain at the moment, IV patent and flushing without difficulty, no signs of infiltration.

## 2018-06-24 NOTE — DISCHARGE NOTE ADULT - MEDICATION SUMMARY - MEDICATIONS TO TAKE
I will START or STAY ON the medications listed below when I get home from the hospital:    aspirin 81 mg oral delayed release tablet  -- 1 tab(s) by mouth once a day  -- Indication: For Afib    apixaban 5 mg oral tablet  -- 1 tab(s) by mouth every 12 hours  -- Indication: For Afib    meclizine 25 mg oral tablet  -- 1 tab(s) by mouth 2 times a day, As needed, Dizziness  -- Indication: For Vertigo    atorvastatin 20 mg oral tablet  -- 1 tab(s) by mouth once a day (at bedtime)  -- Indication: For Hyperlipidemia    metoprolol tartrate 25 mg oral tablet  -- 1 tab(s) by mouth 2 times a day  -- Indication: For Afib

## 2018-06-24 NOTE — CONSULT NOTE ADULT - SUBJECTIVE AND OBJECTIVE BOX
HPI:  76 year old female with PMHx Diverticulitis, HTN, abdominal abcess, admitted 6/23 c/o dizziness , nausea, vomiting associated with unsteady gait, tinnitus, and mild headache. Pt states she awoke yesterday  9 AM and started feeling those symptoms. Reports 2 similar epiosdes few month ago with complete resolution within 24 hours. Today feels slight better although not back to noirmal. Of note, pt found to be in Afib RVR.      PAST MEDICAL & SURGICAL HISTORY:  Essential hypertension  Abscess: abdominal abcess  Diverticulitis  No significant past surgical history      REVIEW OF SYSTEMS:    CONSTITUTIONAL: No fever, weight loss, or fatigue  EYES: No eye pain, visual disturbances, or discharge  ENMT:  No difficulty hearing, tinnitus, vertigo; No sinus or throat pain  NECK: No pain or stiffness  RESPIRATORY: No cough, wheezing, chills or hemoptysis; No shortness of breath  CARDIOVASCULAR: No chest pain, palpitations, dizziness, or leg swelling  GASTROINTESTINAL: No abdominal or epigastric pain. No nausea, vomiting, or hematemesis; No diarrhea or constipation. No melena or hematochezia.  GENITOURINARY: No dysuria, frequency, hematuria, or incontinence  NEUROLOGICAL: No headaches, memory loss, loss of strength, numbness, or tremors  SKIN: No itching, burning, rashes, or lesions   LYMPH NODES: No enlarged glands  ENDOCRINE: No heat or cold intolerance; No hair loss  MUSCULOSKELETAL: No joint pain or swelling; No muscle, back, or extremity pain  PSYCHIATRIC: No depression, anxiety, mood swings, or difficulty sleeping  HEME/LYMPH: No easy bruising, or bleeding gums    MEDICATIONS  (STANDING):  aspirin enteric coated 81 milliGRAM(s) Oral daily  atorvastatin 20 milliGRAM(s) Oral at bedtime  diltiazem    Tablet 30 milliGRAM(s) Oral every 6 hours  ondansetron Injectable 4 milliGRAM(s) IV Push once  pantoprazole    Tablet 40 milliGRAM(s) Oral before breakfast  sodium chloride 0.9%. 1000 milliLiter(s) (100 mL/Hr) IV Continuous <Continuous>    MEDICATIONS  (PRN):  diltiazem Injectable 5 milliGRAM(s) IV Push once PRN HR>120 and CALL MD  meclizine 25 milliGRAM(s) Oral two times a day PRN Dizziness  metoprolol tartrate Injectable 5 milliGRAM(s) IV Push every 6 hours PRN sustained HR > 130s      Allergies    penicillin (Unknown)    Intolerances        SOCIAL HISTORY:    FAMILY HISTORY:  No pertinent family history in first degree relatives      PHYSICAL EXAM:  Vital Signs Last 24 Hrs  T(F): 98.8 (06-24-18 @ 03:56)  HR: 111 (06-24-18 @ 06:47)  BP: 158/89 (06-24-18 @ 06:47)  RR: 18 (06-24-18 @ 06:47)    GENERAL: NAD, well-groomed, well-developed  HEAD:  Atraumatic, Normocephalic  EYES: EOMI,  NECK: Supple,   NERVOUS SYSTEM:  Alert & Oriented X3, speech and language normal, cranial nerves II-XII normal,   Good concentration; Motor Strength 5/5 B/L upper and lower extremities; DTRs 2+ intact and symmetric, plantar responses flexor bilaterally, sensory exam normal to light touch, pin prick and position sense, stance and gait normal, no dysmetri bilaterally          LABS:                        13.1   8.9   )-----------( 337      ( 24 Jun 2018 07:52 )             39.1     06-24    140  |  105  |  13.0  ----------------------------<  121<H>  3.9   |  20.0<L>  |  0.51    Ca    9.0      24 Jun 2018 07:52  Mg     2.0     06-24    TPro  6.9  /  Alb  3.6  /  TBili  0.4  /  DBili  x   /  AST  17  /  ALT  15  /  AlkPhos  80  06-24    PT/INR - ( 24 Jun 2018 07:52 )   PT: 11.8 sec;   INR: 1.07 ratio         PTT - ( 24 Jun 2018 07:52 )  PTT:31.6 sec      RADIOLOGY & ADDITIONAL STUDIES:

## 2018-06-24 NOTE — CONSULT NOTE ADULT - SUBJECTIVE AND OBJECTIVE BOX
[facility]                                                        Office: [address] [place] [address]                                                       Telephone: [phone]. Fax:[phone]    Patient is a 77y old  Female who presents with a chief complaint of dizziness, nausea, vomiting (24 Jun 2018 06:02)      HPI: 75 y/o F with history of diverticulitis,  abdominal abscess with 1 day hx of nausea/vomiting/dizziness, began when she woke up yesterday morning.  Associated with a sensation of the room spinning.  Has been getting similar episodes for the past  couple of years, but usually limited in time, going away within hours.  In addition, also noted to in afib with RVR 140s, with inferolateral ST depression.  No weakness.  [place]-Hallpike maneuver performed at bedside, with acute worsening of symptoms, + nystagmus, positive for vertigo.     PAST MEDICAL & SURGICAL HISTORY:  Essential hypertension  Abscess: abdominal abcess  Diverticulitis  No significant past surgical history    Allergies  penicillin (Unknown)  Intolerances      Home Medications: NONE      MEDICATIONS  (STANDING):  aspirin enteric coated 81 milliGRAM(s) Oral daily  atorvastatin 20 milliGRAM(s) Oral at bedtime  diltiazem    Tablet 30 milliGRAM(s) Oral every 6 hours  ondansetron Injectable 4 milliGRAM(s) IV Push once  pantoprazole    Tablet 40 milliGRAM(s) Oral before breakfast  sodium chloride 0.9%. 1000 milliLiter(s) (100 mL/Hr) IV Continuous <Continuous>    MEDICATIONS  (PRN):  diltiazem Injectable 5 milliGRAM(s) IV Push once PRN HR>120 and CALL MD  meclizine 25 milliGRAM(s) Oral two times a day PRN Dizziness    FAMILY HISTORY:  No pertinent family history in first degree relatives      SOCIAL HISTORY: No tobacco/ No etoh/ No illicit drug use    PREVIOUS DIAGNOSTIC TESTING:   NONE    REVIEW OF SYSTEMS:  CONSTITUTIONAL: [-]fever   [-] weight loss   [-] fatigue  EYES: [-]  eye pain   [-] visual disturbances      [-]  discharge  ENMT:  [-]  difficulty hearing,   [+]  tinnitus   [+] vertigo    [-]  sinus or throat pain  NECK: [-]  pain or stiffness  RESPIRATORY: [-]  cough 	[-] wheezing 	[-]  hemoptysis 		[-]   Shortness of Breath  CARDIOVASCULAR: [-]  chest pain	[-] palpitations		[-]  passing out 		[-] dizziness 	[-]  leg swelling  		[-]  PND 	[-] orthopnea  GASTROINTESTINAL: [-]  abdominal pain		[+]nausea	[+] vomiting	[-]  hematemesis 	[-]  diarrhea  	[-] constipation 		[-]  melena 	[-] hematochezia.  GENITOURINARY: [-] dysuria	[-] frequency	[-] hematuria	[-]  incontinence  NEUROLOGICAL: [-]  headaches		[-] memory loss 	[-]  loss of strength  			[-]  numbness/tingling 	[-]  tremors  SKIN: [-]  itching 	[-] rashes 	[-]  lesions   LYMPH Nodes: [-] enlarged glands  ENDOCRINE: [-] heat or cold intolerance 	[-]   hair loss  MUSCULOSKELETAL: [-] joint pain  [-] joint swelling	[-]  muscle, back, or extremity pain  PSYCHIATRIC: [-]  depression	[-] anxiety	[-] mood swings		[-]  difficulty sleeping   HEME: [-]  easy bruising 	[-]  bleeding   ALLERY AND IMMUNOLOGIC: [-]  hives or eczema	    Vital Signs Last 24 Hrs  T(C): 37.1 (24 Jun 2018 03:56), Max: 37.1 (24 Jun 2018 03:56)  T(F): 98.8 (24 Jun 2018 03:56), Max: 98.8 (24 Jun 2018 03:56)  HR: 111 (24 Jun 2018 06:47) (75 - 133)  BP: 158/89 (24 Jun 2018 06:47) (131/78 - 190/81)  RR: 18 (24 Jun 2018 06:47) (18 - 20)  SpO2: 98% (24 Jun 2018 06:47) (97% - 100%)  Daily Height in cm: 167.64 (24 Jun 2018 00:55)      PHYSICAL EXAM:  Appearance: well nourished, appears uncomfortable	  HEENT:   Normal oral mucosa, PERRL, EOMI, sclera non-icteric	  Lymphatic: No cervical lymphadenopathy  Cardiovascular: Normal S1 S2, No JVD, No cardiac murmurs, No carotid bruits, No peripheral edema  Respiratory: Lungs clear to auscultation	  Psychiatry: A & O x 3, Mood & affect appropriate  Gastrointestinal:  Soft, Non-tender, + BS, no bruits	  Skin: No rashes, No ecchymoses, No cyanosis, Dry  Neurologic: Grossly non-focal with full strength in all four extremities  Extremities: Normal range of motion, No clubbing, cyanosis or edema  Vascular: Peripheral pulses palpable 2+ bilaterally, warm    INTERPRETATION OF TELEMETRY: afib 120s    ECG (tracing reviewed by me): afib 149 ventricular rates, inferolateral ST depression    LABS:                        13.7   10.2  )-----------( 321      ( 24 Jun 2018 02:02 )             41.4     06-24    141  |  105  |  15.0  ----------------------------<  129<H>  4.3   |  19.0<L>  |  0.59    Ca    9.4      24 Jun 2018 02:02  Mg     2.0     06-24    TPro  7.5  /  Alb  4.0  /  TBili  0.5  /  DBili  x   /  AST  24  /  ALT  17  /  AlkPhos  89  06-24    CARDIAC MARKERS ( 24 Jun 2018 02:02 )  x     / <0.01 ng/mL / x     / x     / x        PT/INR - ( 24 Jun 2018 02:34 )   PT: 10.9 sec;   INR: 0.99 ratio    PTT - ( 24 Jun 2018 02:34 )  PTT:27.3 sec    RADIOLOGY & ADDITIONAL STUDIES:  CXR (image reviewed by me): no cardiomegaly, no acute cardiopulmonary process; formal read pending    < from: CT Head No Cont (06.24.18 @ 03:50) >  Brain: No hemorrhage.No mass effect or edema. No midline shift.   Parenchyma and sulci are age-appropriate.  Ventricles: No evidence of hydrocephalus.    Bones and soft tissues: No acute calvarial fracture.  Sinuses and mastoids: Unremarkable.    IMPRESSION:    No acute intracranial pathology.    < end of copied text >

## 2018-06-24 NOTE — H&P ADULT - PROBLEM SELECTOR PLAN 2
PT consult  fall precautions  concern for CVA in setting of new onset afib  Neurology and cardiology consulted  [facility] cardiology   Neurology consulted Dr. Mayberry group  meclizine for vertigo  zofran prn vomiting

## 2018-06-24 NOTE — DISCHARGE NOTE ADULT - CARE PROVIDER_API CALL
Steven Mayberry), Neurology  [address] [place] [address]  Phone: [phone]  Fax: [phone]    Tita Paniagua (), Cardiology; Internal Medicine  [address] [place] [address]  Phone: [phone]  Fax: [phone]

## 2018-06-24 NOTE — H&P ADULT - PROBLEM SELECTOR PLAN 5
previously discharged on amlodipine  not known dx as outpt  will allow permissive HTN at this time due to concern for CVA

## 2018-06-24 NOTE — ED PROVIDER NOTE - MEDICAL DECISION MAKING DETAILS
78 y/o presents with acute onset n/v, ringing in the ear, headache, plan to admit for new onset Afib.

## 2018-06-24 NOTE — ED PROVIDER NOTE - PHYSICAL EXAMINATION
Constitutional : Appears uncomfortable, talking in few words  Head :NC AT , no swelling  Eyes :eomi, no swelling  Mouth :mm dry  Neck : supple, trachea in midline  Chest :Jun air entry, symm chest expansion, no distress  Heart :S1 S2 distant  Abdomen :abd soft, obese, non tender, no groin erythema   Musc/Skel :ext no swelling, no deformity, no spine tenderness, distal pulses present  Neuro : AAO*3, follows commands  motor jun upper and lower ext 5/5  sensory symm and intact  CN 2-12 grossly intact  no nystagmus  finger to nose, symm jun, no pronator drift

## 2018-06-24 NOTE — H&P ADULT - HISTORY OF PRESENT ILLNESS
76 year old female with PMHx Diverticulitis, HTN, abdominal abcess, who presents today c/o nausea and vomiting associated with unsteady gait, tinnitus, and headache. Pt denies fever, diarrhea, constipation, gu sx, cough, numbness, tingling, loss of motor function, change in speech, visual changes, cp, sob. 76 year old female with PMHx Diverticulitis, HTN, abdominal abcess, who presents today c/o nausea and vomiting associated with unsteady gait, tinnitus, and headache. Pt states she awoke with these sx at 9 AM and have not resolved and have gotten worse. Pt reports two similar episodes in past, doesn't remember when, but states they resolved on own. Pt states she is so dizzy, that when she tries to walk she falls to one side. Pt states room is spinning around her and medication given in ED only slightly helps. Of note, pt found to be in Afib RVR. Pt denies fever, diarrhea, constipation, gu sx, cough, numbness, tingling, loss of motor function, change in speech, visual changes, cp, sob.

## 2018-06-24 NOTE — PROGRESS NOTE ADULT - SUBJECTIVE AND OBJECTIVE BOX
Patient: BRAVO KILGORE [id number] 77y Female                           Internal Medicine Hospitalist Progress Note  Chief Complaint: Patient is a 77y old  Female who presents with a chief complaint of dizziness, nausea, vomiting (24 Jun 2018 06:02)    HPI:  76 year old female with PMHx Diverticulitis, HTN, abdominal abcess, who presents today c/o nausea and vomiting associated with unsteady gait, tinnitus, and headache. Pt states she awoke with these sx at 9 AM and have not resolved and have gotten worse. Pt reports two similar episodes in past, doesn't remember when, but states they resolved on own. Pt states she is so dizzy, that when she tries to walk she falls to one side. Pt states room is spinning around her and medication given in ED only slightly helps. Of note, pt found to be in Afib RVR. Pt denies fever, diarrhea, constipation, gu sx, cough, numbness, tingling, loss of motor function, change in speech, visual changes, cp, sob. (24 Jun 2018 06:02)    Interim History: Pt rate controlled in the ED.  Seen by cardiology and neurology, felt vertigo may be due to BPV.  CT Head negative for CVA.  Started on anticoagulation.    Pt denies new complaints.  Still reports dizziness with movement of head.  No focal weakness / numbness.  No chest pain / palpitations.  Tele showing Afib, rate 80s-100s.      ____________________PHYSICAL EXAM:  Vitals reviewed as indicated below  GENERAL:  NAD Alert and Oriented x 3   HEENT: NCAT  CARDIOVASCULAR:  irreg S1, S2  LUNGS: CTAB  ABDOMEN:  soft, (-) tenderness, (-) distension, (+) bowel sounds, (-) guarding, (-) rebound (-) rigidity  EXTREMITIES:  no cyanosis / clubbing / edema.   NEURO: Strength symmetric, sensation intact.   ____________________      BACKGROUND:  HEALTH ISSUES - PROBLEM Dx:  Disorder of vestibular function, unspecified laterality: Disorder of vestibular function, unspecified laterality  Essential hypertension: Essential hypertension  Hyperglycemia: Hyperglycemia  Acquired hypothyroidism: Acquired hypothyroidism  Ataxia: Ataxia  Atrial fibrillation with RVR: Atrial fibrillation with RVR        Allergies    penicillin (Unknown)    Intolerances      PAST MEDICAL & SURGICAL HISTORY:  Essential hypertension  Abscess: abdominal abcess  Diverticulitis  No significant past surgical history        VITALS:  Vital Signs Last 24 Hrs  T(C): 36.8 (24 Jun 2018 10:34), Max: 37.1 (24 Jun 2018 03:56)  T(F): 98.2 (24 Jun 2018 10:34), Max: 98.8 (24 Jun 2018 03:56)  HR: 90 (24 Jun 2018 10:34) (75 - 133)  BP: 135/76 (24 Jun 2018 10:34) (131/78 - 190/81)  BP(mean): --  RR: 18 (24 Jun 2018 10:34) (18 - 20)  SpO2: 97% (24 Jun 2018 10:34) (97% - 100%) Daily Height in cm: 167.64 (24 Jun 2018 00:55)    Daily   CAPILLARY BLOOD GLUCOSE        I&O's Summary        LABS:                        13.1   8.9   )-----------( 337      ( 24 Jun 2018 07:52 )             39.1     06-24    140  |  105  |  13.0  ----------------------------<  121<H>  3.9   |  20.0<L>  |  0.51    Ca    9.0      24 Jun 2018 07:52  Mg     2.0     06-24    TPro  6.9  /  Alb  3.6  /  TBili  0.4  /  DBili  x   /  AST  17  /  ALT  15  /  AlkPhos  80  06-24    PT/INR - ( 24 Jun 2018 07:52 )   PT: 11.8 sec;   INR: 1.07 ratio         PTT - ( 24 Jun 2018 07:52 )  PTT:31.6 sec  LIVER FUNCTIONS - ( 24 Jun 2018 07:52 )  Alb: 3.6 g/dL / Pro: 6.9 g/dL / ALK PHOS: 80 U/L / ALT: 15 U/L / AST: 17 U/L / GGT: x             CARDIAC MARKERS ( 24 Jun 2018 07:52 )  x     / <0.01 ng/mL / x     / x     / x      CARDIAC MARKERS ( 24 Jun 2018 02:02 )  x     / <0.01 ng/mL / x     / x     / x              MEDICATIONS:  MEDICATIONS  (STANDING):  aspirin enteric coated 81 milliGRAM(s) Oral daily  atorvastatin 20 milliGRAM(s) Oral at bedtime  diltiazem    Tablet 30 milliGRAM(s) Oral every 6 hours  enoxaparin Injectable 70 milliGRAM(s) SubCutaneous every 12 hours  ondansetron Injectable 4 milliGRAM(s) IV Push once  pantoprazole    Tablet 40 milliGRAM(s) Oral before breakfast  sodium chloride 0.9%. 1000 milliLiter(s) (100 mL/Hr) IV Continuous <Continuous>    MEDICATIONS  (PRN):  diltiazem Injectable 5 milliGRAM(s) IV Push once PRN HR>120 and CALL MD  meclizine 25 milliGRAM(s) Oral two times a day PRN Dizziness  metoprolol tartrate Injectable 5 milliGRAM(s) IV Push every 6 hours PRN sustained HR > 130s

## 2018-06-24 NOTE — CONSULT NOTE ADULT - ASSESSMENT
suspect vestibular dysfunction with dizziness an dother symptoms, reasonable to do mri to mrule cva, also with new new onset afib for which recommend cardiology evaluation for possible need candidate for new oral anticoagulant.  There is no  neurological contraindication for anticoagulation in this patient. Will need vng as an outpatient once mri rules out cva.

## 2018-06-24 NOTE — DISCHARGE NOTE ADULT - PLAN OF CARE
stable for discharge It is important to see your primary physician as well as the physicians noted below within the next week to perform a comprehensive medical review.  Call their offices for an appointment as soon as you leave the hospital.  If you do not have a primary physician, contact the [facility] [place] [phone] located on [address] [place] [address].  Your medical issues appear to be stable at this time, but if your symptoms recur or worsen, contact your physicians and/or return to the hospital if necessary.  If you encounter any issues or questions with your medication, call your physicians before stopping the medication.  Do not drive.  Limit your diet to 2 grams of sodium daily. Continue current medications. see your doctor for further evaluation.

## 2018-06-25 LAB
CHOLEST SERPL-MCNC: 156 MG/DL — SIGNIFICANT CHANGE UP (ref 110–199)
HBA1C BLD-MCNC: 5.4 % — SIGNIFICANT CHANGE UP (ref 4–5.6)
HDLC SERPL-MCNC: 51 MG/DL — LOW
LIPID PNL WITH DIRECT LDL SERPL: 89 MG/DL — SIGNIFICANT CHANGE UP
TOTAL CHOLESTEROL/HDL RATIO MEASUREMENT: 3 RATIO — LOW (ref 3.3–7.1)
TRIGL SERPL-MCNC: 80 MG/DL — SIGNIFICANT CHANGE UP (ref 10–200)
TSH SERPL-MCNC: 4.75 UIU/ML — HIGH (ref 0.27–4.2)

## 2018-06-25 PROCEDURE — 70544 MR ANGIOGRAPHY HEAD W/O DYE: CPT | Mod: 26,59

## 2018-06-25 PROCEDURE — 70551 MRI BRAIN STEM W/O DYE: CPT | Mod: 26

## 2018-06-25 PROCEDURE — 99233 SBSQ HOSP IP/OBS HIGH 50: CPT

## 2018-06-25 PROCEDURE — 93306 TTE W/DOPPLER COMPLETE: CPT | Mod: 26

## 2018-06-25 PROCEDURE — 70547 MR ANGIOGRAPHY NECK W/O DYE: CPT | Mod: 26

## 2018-06-25 RX ORDER — METOPROLOL TARTRATE 50 MG
50 TABLET ORAL
Qty: 0 | Refills: 0 | Status: DISCONTINUED | OUTPATIENT
Start: 2018-06-25 | End: 2018-06-26

## 2018-06-25 RX ADMIN — ATORVASTATIN CALCIUM 20 MILLIGRAM(S): 80 TABLET, FILM COATED ORAL at 21:47

## 2018-06-25 RX ADMIN — ENOXAPARIN SODIUM 70 MILLIGRAM(S): 100 INJECTION SUBCUTANEOUS at 17:14

## 2018-06-25 RX ADMIN — ENOXAPARIN SODIUM 70 MILLIGRAM(S): 100 INJECTION SUBCUTANEOUS at 05:01

## 2018-06-25 RX ADMIN — PANTOPRAZOLE SODIUM 40 MILLIGRAM(S): 20 TABLET, DELAYED RELEASE ORAL at 05:01

## 2018-06-25 RX ADMIN — SODIUM CHLORIDE 100 MILLILITER(S): 9 INJECTION INTRAMUSCULAR; INTRAVENOUS; SUBCUTANEOUS at 02:15

## 2018-06-25 RX ADMIN — Medication 25 MILLIGRAM(S): at 11:56

## 2018-06-25 RX ADMIN — Medication 50 MILLIGRAM(S): at 21:47

## 2018-06-25 RX ADMIN — Medication 81 MILLIGRAM(S): at 11:56

## 2018-06-25 NOTE — PROGRESS NOTE ADULT - ASSESSMENT
76 yo female admitted with ataxia, vertigo, and n/v in setting of new onset afib RVR concerning for CVA/TIA.     ·  Problem: Atrial fibrillation with RVR.  - Change to monitored bed.  Rate now controlled.  Change to oral anticoagulation.  Cardiology followup appreciated.  Awaiting TTE.    ·  Problem: Ataxia.  Plan: Improving.  Negative for CVA.  D/c neuro checks / stroke protocol.    ·  Problem: Acquired hypothyroidism.  Plan: TFTs appear to be unremarkable.  ? thyroid nodule Check Thyroid U/S.    ·  Problem: Hyperglycemia.  Plan: f/u HbA1c.   ·  Problem: Essential hypertension.  allow permissive HTN at this time due to concern for CVA.   DVT prophylaxis - on anticoagulation.

## 2018-06-25 NOTE — PHYSICAL THERAPY INITIAL EVALUATION ADULT - SITTING BALANCE: STATIC
[address] [place]  
[phone] Patient: Antonella Loving Page MRN: [id number] GFP: About your child's hospitalization Your child was admitted on:  2018 Your child last received care in the:  [address] [facility] Your child was discharged on:  2018 Why your child was hospitalized Your child's primary diagnosis was:  Not on File Your child's diagnoses also included:  Normal Labor Follow-up Information None Discharge Orders None A check elia indicates which time of day the medication should be taken. My Medications Notice You have not been prescribed any medications. Discharge Instructions Your Butler at Home: Care Instructions Your Care Instructions During your baby's first few weeks, you will spend most of your time feeding, diapering, and comforting your baby. You may feel overwhelmed at times. It is normal to wonder if you know what you are doing, especially if you are first-time parents. Butler care gets easier with every day. Soon you will know what each cry means and be able to figure out what your baby needs and wants. Follow-up care is a key part of your child's treatment and safety. Be sure to make and go to all appointments, and call your doctor if your child is having problems. It's also a good idea to know your child's test results and keep a list of the medicines your child takes. How can you care for your child at home? Feeding · Feed your baby on demand. This means that you should breastfeed or bottle-feed your baby whenever he or she seems hungry. Do not set a schedule. · During the first 2 weeks,  babies need to be fed every 1 to 3 hours (10 to 12 times in 24 hours) or whenever the baby is hungry. Formula-fed babies may need fewer feedings, about 6 to 10 every 24 hours. · These early feedings often are short. Sometimes, a  nurses or drinks from a bottle only for a few minutes. Feedings gradually will last longer. · You may have to wake your sleepy baby to feed in the first few days after birth. Sleeping · Always put your baby to sleep on his or her back, not the stomach. This lowers the risk of sudden infant death syndrome (SIDS). · Most babies sleep for a total of 18 hours each day. They wake for a short time at least every 2 to 3 hours. · Newborns have some moments of active sleep. The baby may make sounds or seem restless. This happens about every 50 to 60 minutes and usually lasts a few minutes. · At first, your baby may sleep through loud noises. Later, noises may wake your baby. · When your  wakes up, he or she usually will be hungry and will need to be fed. Diaper changing and bowel habits · Try to check your baby's diaper at least every 2 hours. If it needs to be changed, do it as soon as you can. That will help prevent diaper rash. · Your 's wet and soiled diapers can give you clues about your baby's health. Babies can become dehydrated if they're not getting enough breast milk or formula or if they lose fluid because of diarrhea, vomiting, or a fever. · For the first few days, your baby may have about 3 wet diapers a day. After that, expect 6 or more wet diapers a day throughout the first month of life. It can be hard to tell when a diaper is wet if you use disposable diapers. If you cannot tell, put a piece of tissue in the diaper. It will be wet when your baby urinates. · Keep track of what bowel habits are normal or usual for your child. Umbilical cord care · Gently clean your baby's umbilical cord stump and the skin around it at least one time a day. You also can clean it during diaper changes. · Gently pat dry the area with a soft cloth.  You can help your baby's umbilical cord stump fall off and heal faster by keeping it dry between cleanings. · The stump should fall off within a week or two. After the stump falls off, keep cleaning around the belly button at least one time a day until it has healed. When should you call for help? Call your baby's doctor now or seek immediate medical care if: 
? · Your baby has a rectal temperature that is less than 97.8°F or is 100.4°F or higher. Call if you cannot take your baby's temperature but he or she seems hot. ? · Your baby has no wet diapers for 6 hours. ? · Your baby's skin or whites of the eyes gets a brighter or deeper yellow. ? · You see pus or red skin on or around the umbilical cord stump. These are signs of infection. ? Watch closely for changes in your child's health, and be sure to contact your doctor if: 
? · Your baby is not having regular bowel movements based on his or her age. ? · Your baby cries in an unusual way or for an unusual length of time. ? · Your baby is rarely awake and does not wake up for feedings, is very fussy, seems too tired to eat, or is not interested in eating. Where can you learn more? Go to http://tomy-cherri.info/. Enter E445 in the search box to learn more about \"Your  at Home: Care Instructions. \" Current as of: May 12, 2017 Content Version: 11.4 © 5837-0637 [employer]. Care instructions adapted under license by [employer] (which disclaims liability or warranty for this information). If you have questions about a medical condition or this instruction, always ask your healthcare professional. [place] [address] any warranty or liability for your use of this information. DISCHARGE SUMMARY from Nurse PATIENT INSTRUCTIONS: 
 
After general anesthesia or intravenous sedation, for 24 hours or while taking prescription Narcotics: · Limit your activities · Do not drive and operate hazardous machinery · Do not make important personal or business decisions · Do  not drink alcoholic beverages · If you have not urinated within 8 hours after discharge, please contact your surgeon on call. Report the following to your surgeon: 
· Excessive pain, swelling, redness or odor of or around the surgical area · Temperature over 100.5 · Nausea and vomiting lasting longer than 4 hours or if unable to take medications · Any signs of decreased circulation or nerve impairment to extremity: change in color, persistent  numbness, tingling, coldness or increase pain · Any questions What to do at Home: *  Please give a list of your current medications to your Primary Care Provider. *  Please update this list whenever your medications are discontinued, doses are 
    changed, or new medications (including over-the-counter products) are added. *  Please carry medication information at all times in case of emergency situations. These are general instructions for a healthy lifestyle: No smoking/ No tobacco products/ Avoid exposure to second hand smoke Surgeon General's Warning:  Quitting smoking now greatly reduces serious risk to your health. Obesity, smoking, and sedentary lifestyle greatly increases your risk for illness A healthy diet, regular physical exercise & weight monitoring are important for maintaining a healthy lifestyle You may be retaining fluid if you have a history of heart failure or if you experience any of the following symptoms:  Weight gain of 3 pounds or more overnight or 5 pounds in a week, increased swelling in our hands or feet or shortness of breath while lying flat in bed. Please call your doctor as soon as you notice any of these symptoms; do not wait until your next office visit. Recognize signs and symptoms of STROKE: 
 
F-face looks uneven A-arms unable to move or move unevenly S-speech slurred or non-existent T-time-call 911 as soon as signs and symptoms begin-DO NOT go Back to bed or wait to see if you get better-TIME IS BRAIN. Warning Signs of HEART ATTACK Call 911 if you have these symptoms: 
? Chest discomfort. Most heart attacks involve discomfort in the center of the chest that lasts more than a few minutes, or that goes away and comes back. It can feel like uncomfortable pressure, squeezing, fullness, or pain. ? Discomfort in other areas of the upper body. Symptoms can include pain or discomfort in one or both arms, the back, neck, jaw, or stomach. ? Shortness of breath with or without chest discomfort. ? Other signs may include breaking out in a cold sweat, nausea, or lightheadedness. Don't wait more than five minutes to call [address]! Fast action can save your life. Calling 911 is almost always the fastest way to get lifesaving treatment. Emergency Medical Services staff can begin treatment when they arrive  up to an hour sooner than if someone gets to the hospital by car. The discharge information has been reviewed with the parent. The parent verbalized understanding. Discharge medications reviewed with the caregiver and appropriate educational materials and side effects teaching were provided. ___________________________________________________________________________________________________________________________________ Introducing [facility]! Dear Parent or Guardian, Thank you for requesting a ForeUp account for your child. With ForeUp, you can view your childs hospital or ER discharge instructions, current allergies, immunizations and much more. In order to access your childs information, we require a signed consent on file. Please see the [facility] department or call [phone] for instructions on completing a ForeUp Proxy request.   
Additional Information If you have questions, please visit the Frequently Asked Questions section of the FastDue website at [email or web address] Remember, [email or web address] is NOT to be used for urgent needs. For medical emergencies, dial 911. Now available from your iPhone and Android! Providers Seen During Your Hospitalization Provider Specialty Primary office phone Missy Esposito MD Pediatrics [phone] Immunizations Administered for This Admission Name Date Hep B, Adol/Ped 2018 Your Primary Care Physician (PCP) Primary Care Physician Office Phone Office Fax Otho Pallas [phone] You are allergic to the following No active allergies Recent Documentation Height Weight BMI  
  
  
 0.48 m (16 %, Z= -0.99)* 2.72 kg (8 %, Z= -1.44)* 11.8 kg/m2 *Growth percentiles are based on WHO (Boys, 0-2 years) data. Emergency Contacts Name Discharge Info Relation Home Work Mobile Parent [1] Patient Belongings The following personal items are in your possession at time of discharge: 
                             
 
  
  
 Please provide this summary of care documentation to your next provider. Signatures-by signing, you are acknowledging that this After Visit Summary has been reviewed with you and you have received a copy. Patient Signature:  ____________________________________________________________ Date:  ____________________________________________________________  
  
Carisa Russell Provider Signature:  ____________________________________________________________ Date:  ____________________________________________________________ good balance

## 2018-06-25 NOTE — PROGRESS NOTE ADULT - SUBJECTIVE AND OBJECTIVE BOX
[facility]                                                        Office: [address] [place] [address]                                                       Telephone: [phone]. Fax:[phone]      CARDIOLOGY PROGRESS NOTE   ([facility])    Subjective: Patient seen and examined.  Sitting up at side of bed.  Feels much better, much less dizzy.  No palpitations, chest pain, shortness of breath.      CURRENT MEDICATIONS  diltiazem    Tablet 30 milliGRAM(s) Oral every 6 hours  diltiazem Injectable 5 milliGRAM(s) IV Push once PRN  metoprolol tartrate Injectable 5 milliGRAM(s) IV Push every 6 hours PRN  meclizine 25 milliGRAM(s) Oral two times a day PRN  ondansetron Injectable 4 milliGRAM(s) IV Push once  pantoprazole    Tablet 40 milliGRAM(s) Oral before breakfast  atorvastatin 20 milliGRAM(s) Oral at bedtime  aspirin enteric coated 81 milliGRAM(s) Oral daily  enoxaparin Injectable 70 milliGRAM(s) SubCutaneous every 12 hours  sodium chloride 0.9%. 1000 milliLiter(s) IV Continuous <Continuous>  	  TELEMETRY: afib 80s-110s  Vitals:  T(C): 36.8 (06-25-18 @ 08:40), Max: 37.1 (06-24-18 @ 16:00)  HR: 117 (06-25-18 @ 08:00) (86 - 117)  BP: 109/73 (06-25-18 @ 08:00) (104/52 - 164/88)  RR: 14 (06-25-18 @ 08:00) (14 - 22)  SpO2: 100% (06-25-18 @ 08:00) (96% - 100%)  Wt(kg): --  I&O's Summary    24 Jun 2018 07:01  -  25 Jun 2018 07:00  --------------------------------------------------------  IN: 1860 mL / OUT: 0 mL / NET: 1860 mL    PHYSICAL EXAM:  Appearance: Normal	  HEENT:   Normal oral mucosa, PERRL, EOMI	  Lymphatic: No lymphadenopathy  Cardiovascular: Normal S1 S2, No JVD, No murmurs, No edema, tachycardic, irregularly irregular  Respiratory: Lungs clear to auscultation	  Psychiatry: A & O x 3, Mood & affect appropriate  Gastrointestinal:  Soft, Non-tender, + BS	  Skin: No rashes, No ecchymoses, No cyanosis  Neurologic: Non-focal  Extremities: Normal range of motion, No clubbing, cyanosis or edema  Vascular: Peripheral pulses palpable 2+ bilaterally, warm      DIAGNOSTIC TESTING:  Echocardiogram pending                        13.1   8.9   )-----------( 337      ( 24 Jun 2018 07:52 )             39.1     06-24    140  |  105  |  13.0  ----------------------------<  121<H>  3.9   |  20.0<L>  |  0.51    Ca    9.0      24 Jun 2018 07:52  Mg     2.0     06-24    TPro  6.9  /  Alb  3.6  /  TBili  0.4  /  DBili  x   /  AST  17  /  ALT  15  /  AlkPhos  80  06-24    HgA1c: Hemoglobin A1C, Whole Blood: 5.4 % (06-25 @ 06:11)    TSH: Thyroid Stimulating Hormone, Serum: 4.75 uIU/mL (06-25 @ 06:11)

## 2018-06-25 NOTE — PHYSICAL THERAPY INITIAL EVALUATION ADULT - PERTINENT HX OF CURRENT PROBLEM, REHAB EVAL
76 year old female with PMHx Diverticulitis, HTN, abdominal abcess, who presents today c/o nausea and vomiting associated with unsteady gait, tinnitus, and headache. Of note, pt found to be in Afib RVR. Per Neurology, symptoms are likely vestibular/BPPV

## 2018-06-25 NOTE — PROGRESS NOTE ADULT - SUBJECTIVE AND OBJECTIVE BOX
INTERVAL HISTORY:    patient feels much better. minimal dizzy.  Able to eat. Able to amubulate with assist    VITAL SIGNS:  Vital Signs Last 24 Hrs  T(C): 36.8 (25 Jun 2018 08:40), Max: 37.1 (24 Jun 2018 16:00)  T(F): 98.2 (25 Jun 2018 08:40), Max: 98.7 (24 Jun 2018 16:00)  HR: 117 (25 Jun 2018 08:00) (86 - 117)  BP: 109/73 (25 Jun 2018 08:00) (104/52 - 164/88)  BP(mean): --  RR: 14 (25 Jun 2018 08:00) (14 - 22)  SpO2: 100% (25 Jun 2018 08:00) (96% - 100%)    PHYSICAL EXAMINATION:    Mentation:  nl  Language/Speech: nl  CN: nl, no nystagmus, EOMI, PERRL  Visual Fields: full  Motor: 5/5 x 4  Sensory: nl  DTR:  Babinski:      MEDS:  MEDICATIONS  (STANDING):  aspirin enteric coated 81 milliGRAM(s) Oral daily  atorvastatin 20 milliGRAM(s) Oral at bedtime  diltiazem    Tablet 30 milliGRAM(s) Oral every 6 hours  enoxaparin Injectable 70 milliGRAM(s) SubCutaneous every 12 hours  ondansetron Injectable 4 milliGRAM(s) IV Push once  pantoprazole    Tablet 40 milliGRAM(s) Oral before breakfast  sodium chloride 0.9%. 1000 milliLiter(s) (100 mL/Hr) IV Continuous <Continuous>    MEDICATIONS  (PRN):  diltiazem Injectable 5 milliGRAM(s) IV Push once PRN HR>120 and CALL MD  meclizine 25 milliGRAM(s) Oral two times a day PRN Dizziness  metoprolol tartrate Injectable 5 milliGRAM(s) IV Push every 6 hours PRN sustained HR > 130s      LABS:                          13.1   8.9   )-----------( 337      ( 24 Jun 2018 07:52 )             39.1     06-24    140  |  105  |  13.0  ----------------------------<  121<H>  3.9   |  20.0<L>  |  0.51    Ca    9.0      24 Jun 2018 07:52  Mg     2.0     06-24    TPro  6.9  /  Alb  3.6  /  TBili  0.4  /  DBili  x   /  AST  17  /  ALT  15  /  AlkPhos  80  06-24    LIVER FUNCTIONS - ( 24 Jun 2018 07:52 )  Alb: 3.6 g/dL / Pro: 6.9 g/dL / ALK PHOS: 80 U/L / ALT: 15 U/L / AST: 17 U/L / GGT: x               RADIOLOGY & ADDITIONAL STUDIES:    MRI, MRAs pendinng    IMPRESSION & PLAN:    Vertigo- likely vestibular. She has had similar but milder episodes in the past few years. - Consider- Meniere's, BPPV etc  Work for possible TIA- posterior circ.  Await  MRI brain, MRAs     Medical and Cardiac evaluation and treatment as indicated  Cerebrovascular risk factor assessment and management  Antiplatelet therapy as prescribed.

## 2018-06-25 NOTE — PROGRESS NOTE ADULT - ASSESSMENT
77 y/o F with history of diverticulitis,  abdominal abscess with 1 day hx of nausea/vomiting/dizziness, began when she woke up yesterday morning.  Associated with a sensation of the room spinning.  Has been getting similar episodes for the past  couple of years, but usually limited in time, going away within hours.  In addition, also noted to in afib with RVR 140s, with inferolateral ST depression.  No weakness.  Santos-Hallpike maneuver performed at bedside, with acute worsening of symptoms, + nystagmus, positive for vertigo. CT head unremarkable.     # vertigo  # atrial fibrillation with RVR - CHADSVASC at least 3 (age,gender)  # inferolateral ST depression on ECG    - on LMWH,  transition to NOAC on discharge - eliquis 5 mg bid.   - may give 5 mg IV lopressor q6 hr prn for sustained HRs > 130s  - ok to continue diltiazem for now.  No evidence for decompensated heart failure on exam  - TTE pending  - if LVEF low or symptomatic with  ambulation, would consider SIMON DCCV  - eventual ischemia evaluation - will do this as an outpatient if nml LVEF on TTE  - MRI brain/MRA neck pending  -D/W patient, Dr. Calix    Will follow.    Thank you for allowing me to participate in your patient's care.

## 2018-06-25 NOTE — PHYSICAL THERAPY INITIAL EVALUATION ADULT - ADDITIONAL COMMENTS
Pt lives alone in a private home, has family living nearby. 2 steps to enter with handrails, No steps inside. Pt was independent PTA without assist device. Pt owns RW.

## 2018-06-25 NOTE — PHYSICAL THERAPY INITIAL EVALUATION ADULT - LEVEL OF INDEPENDENCE: GAIT, REHAB EVAL
100 feet ambulated without RW, pt required Min A 2*2 impaired balance, trial 2 pt ambulated 100 feet with RW, balance improved however, pt apprehensive to change head position 2*2 anticipating dizziness. 100 feet ambulated without RW, pt required Min A 2*2 impaired balance, trial 2 pt ambulated 100 feet with RW, independent, balance improved however, pt apprehensive to change head position 2*2 anticipating dizziness.

## 2018-06-25 NOTE — PROGRESS NOTE ADULT - SUBJECTIVE AND OBJECTIVE BOX
Patient: BRAVO KILGORE [id number] 77y Female                           Internal Medicine Hospitalist Progress Note  Chief Complaint: Patient is a 77y old  Female who presents with a chief complaint of dizziness, nausea, vomiting (2018 06:02)    HPI:  76 year old female with PMHx Diverticulitis, HTN, abdominal abcess, who presents today c/o nausea and vomiting associated with unsteady gait, tinnitus, and headache. Pt states she awoke with these sx at 9 AM and have not resolved and have gotten worse. Pt reports two similar episodes in past, doesn't remember when, but states they resolved on own. Pt states she is so dizzy, that when she tries to walk she falls to one side. Pt states room is spinning around her and medication given in ED only slightly helps. Of note, pt found to be in Afib RVR. Pt denies fever, diarrhea, constipation, gu sx, cough, numbness, tingling, loss of motor function, change in speech, visual changes, cp, sob. (2018 06:02)    Interim History: Pt rate controlled in the ED.  Seen by cardiology and neurology, felt vertigo may be due to BPV.  CT Head negative for CVA.  Started on anticoagulation.  MRI Brain showed no infarct.  MRA Head and neck showed no significant occlusion, ? thyroid nodule.     Dizziness now significantly improved.  No weakness / numbness.  S/p MRI.  Ambulates with PT.      ____________________PHYSICAL EXAM:  Vitals reviewed as indicated below  GENERAL:  NAD Alert and Oriented x 3   HEENT: NCAT  CARDIOVASCULAR:  irreg S1, S2  LUNGS: CTAB  ABDOMEN:  soft, (-) tenderness, (-) distension, (+) bowel sounds, (-) guarding, (-) rebound (-) rigidity  EXTREMITIES:  no cyanosis / clubbing / edema.   NEURO: Strength symmetric, sensation intact.   ____________________    VITALS:  Vital Signs Last 24 Hrs  T(C): 36.6 (2018 12:27), Max: 36.9 (2018 21:00)  T(F): 97.9 (2018 12:27), Max: 98.4 (2018 21:00)  HR: 76 (2018 16:00) (76 - 117)  BP: 107/66 (2018 16:00) (104/52 - 163/95)  BP(mean): --  RR: 16 (2018 16:00) (14 - 22)  SpO2: 99% (2018 16:00) (96% - 100%) Daily     Daily Weight in k.8 (2018 19:43)  CAPILLARY BLOOD GLUCOSE        I&O's Summary    2018 07:01  -  2018 07:00  --------------------------------------------------------  IN: 1960 mL / OUT: 0 mL / NET: 1960 mL    2018 07:01  -  2018 18:13  --------------------------------------------------------  IN: 2080 mL / OUT: 0 mL / NET: 2080 mL        LABS:                        13.1   8.9   )-----------( 337      ( 2018 07:52 )             39.1     06-24    140  |  105  |  13.0  ----------------------------<  121<H>  3.9   |  20.0<L>  |  0.51    Ca    9.0      2018 07:52  Mg     2.0     06-24    TPro  6.9  /  Alb  3.6  /  TBili  0.4  /  DBili  x   /  AST  17  /  ALT  15  /  AlkPhos  80  06-24    PT/INR - ( 2018 07:52 )   PT: 11.8 sec;   INR: 1.07 ratio         PTT - ( 2018 07:52 )  PTT:31.6 sec  LIVER FUNCTIONS - ( 2018 07:52 )  Alb: 3.6 g/dL / Pro: 6.9 g/dL / ALK PHOS: 80 U/L / ALT: 15 U/L / AST: 17 U/L / GGT: x           Urinalysis Basic - ( 2018 14:11 )    Color: Yellow / Appearance: Clear / S.015 / pH: x  Gluc: x / Ketone: Negative  / Bili: Negative / Urobili: Negative mg/dL   Blood: x / Protein: 30 mg/dL / Nitrite: Negative   Leuk Esterase: Trace / RBC: 0-5 /HPF / WBC 3-5   Sq Epi: x / Non Sq Epi: Few / Bacteria: Few      CARDIAC MARKERS ( 2018 07:52 )  x     / <0.01 ng/mL / x     / x     / x      CARDIAC MARKERS ( 2018 02:02 )  x     / <0.01 ng/mL / x     / x     / x            MEDICATIONS:  aspirin enteric coated 81 milliGRAM(s) Oral daily  atorvastatin 20 milliGRAM(s) Oral at bedtime  diltiazem    Tablet 30 milliGRAM(s) Oral every 6 hours  enoxaparin Injectable 70 milliGRAM(s) SubCutaneous every 12 hours  meclizine 25 milliGRAM(s) Oral two times a day PRN  metoprolol tartrate Injectable 5 milliGRAM(s) IV Push every 6 hours PRN  ondansetron Injectable 4 milliGRAM(s) IV Push once  pantoprazole    Tablet 40 milliGRAM(s) Oral before breakfast

## 2018-06-26 VITALS
SYSTOLIC BLOOD PRESSURE: 138 MMHG | OXYGEN SATURATION: 96 % | DIASTOLIC BLOOD PRESSURE: 64 MMHG | TEMPERATURE: 98 F | HEART RATE: 62 BPM | RESPIRATION RATE: 16 BRPM

## 2018-06-26 PROCEDURE — 85730 THROMBOPLASTIN TIME PARTIAL: CPT

## 2018-06-26 PROCEDURE — 83036 HEMOGLOBIN GLYCOSYLATED A1C: CPT

## 2018-06-26 PROCEDURE — 99239 HOSP IP/OBS DSCHRG MGMT >30: CPT

## 2018-06-26 PROCEDURE — 83735 ASSAY OF MAGNESIUM: CPT

## 2018-06-26 PROCEDURE — 80061 LIPID PANEL: CPT

## 2018-06-26 PROCEDURE — 83605 ASSAY OF LACTIC ACID: CPT

## 2018-06-26 PROCEDURE — 96374 THER/PROPH/DIAG INJ IV PUSH: CPT

## 2018-06-26 PROCEDURE — 96375 TX/PRO/DX INJ NEW DRUG ADDON: CPT

## 2018-06-26 PROCEDURE — 83690 ASSAY OF LIPASE: CPT

## 2018-06-26 PROCEDURE — 84436 ASSAY OF TOTAL THYROXINE: CPT

## 2018-06-26 PROCEDURE — 80053 COMPREHEN METABOLIC PANEL: CPT

## 2018-06-26 PROCEDURE — 71045 X-RAY EXAM CHEST 1 VIEW: CPT

## 2018-06-26 PROCEDURE — 84480 ASSAY TRIIODOTHYRONINE (T3): CPT

## 2018-06-26 PROCEDURE — 99285 EMERGENCY DEPT VISIT HI MDM: CPT | Mod: 25

## 2018-06-26 PROCEDURE — 70450 CT HEAD/BRAIN W/O DYE: CPT

## 2018-06-26 PROCEDURE — 84443 ASSAY THYROID STIM HORMONE: CPT

## 2018-06-26 PROCEDURE — 84484 ASSAY OF TROPONIN QUANT: CPT

## 2018-06-26 PROCEDURE — 70551 MRI BRAIN STEM W/O DYE: CPT

## 2018-06-26 PROCEDURE — 85027 COMPLETE CBC AUTOMATED: CPT

## 2018-06-26 PROCEDURE — 70544 MR ANGIOGRAPHY HEAD W/O DYE: CPT

## 2018-06-26 PROCEDURE — 93005 ELECTROCARDIOGRAM TRACING: CPT

## 2018-06-26 PROCEDURE — 70547 MR ANGIOGRAPHY NECK W/O DYE: CPT

## 2018-06-26 PROCEDURE — 36415 COLL VENOUS BLD VENIPUNCTURE: CPT

## 2018-06-26 PROCEDURE — 81001 URINALYSIS AUTO W/SCOPE: CPT

## 2018-06-26 PROCEDURE — 97163 PT EVAL HIGH COMPLEX 45 MIN: CPT

## 2018-06-26 PROCEDURE — 93306 TTE W/DOPPLER COMPLETE: CPT

## 2018-06-26 PROCEDURE — 85610 PROTHROMBIN TIME: CPT

## 2018-06-26 RX ORDER — ASPIRIN/CALCIUM CARB/MAGNESIUM 324 MG
1 TABLET ORAL
Qty: 0 | Refills: 0 | DISCHARGE
Start: 2018-06-26

## 2018-06-26 RX ORDER — METOPROLOL TARTRATE 50 MG
25 TABLET ORAL
Qty: 0 | Refills: 0 | Status: DISCONTINUED | OUTPATIENT
Start: 2018-06-26 | End: 2018-06-26

## 2018-06-26 RX ORDER — METOPROLOL TARTRATE 50 MG
1 TABLET ORAL
Qty: 30 | Refills: 0 | OUTPATIENT
Start: 2018-06-26

## 2018-06-26 RX ORDER — APIXABAN 2.5 MG/1
5 TABLET, FILM COATED ORAL EVERY 12 HOURS
Qty: 0 | Refills: 0 | Status: DISCONTINUED | OUTPATIENT
Start: 2018-06-26 | End: 2018-06-26

## 2018-06-26 RX ORDER — MECLIZINE HCL 12.5 MG
1 TABLET ORAL
Qty: 12 | Refills: 0
Start: 2018-06-26

## 2018-06-26 RX ORDER — ATORVASTATIN CALCIUM 80 MG/1
1 TABLET, FILM COATED ORAL
Qty: 30 | Refills: 0
Start: 2018-06-26

## 2018-06-26 RX ORDER — METOPROLOL TARTRATE 50 MG
1 TABLET ORAL
Qty: 60 | Refills: 0
Start: 2018-06-26

## 2018-06-26 RX ORDER — APIXABAN 2.5 MG/1
1 TABLET, FILM COATED ORAL
Qty: 60 | Refills: 0
Start: 2018-06-26

## 2018-06-26 RX ORDER — PANTOPRAZOLE SODIUM 20 MG/1
1 TABLET, DELAYED RELEASE ORAL
Qty: 30 | Refills: 0 | OUTPATIENT
Start: 2018-06-26

## 2018-06-26 RX ADMIN — PANTOPRAZOLE SODIUM 40 MILLIGRAM(S): 20 TABLET, DELAYED RELEASE ORAL at 05:10

## 2018-06-26 RX ADMIN — Medication 81 MILLIGRAM(S): at 11:45

## 2018-06-26 NOTE — PROGRESS NOTE ADULT - SUBJECTIVE AND OBJECTIVE BOX
Patient: BRAVO KILGORE [id number] 77y Female                           Internal Medicine Hospitalist Progress Note  Chief Complaint: Patient is a 77y old  Female who presents with a chief complaint of dizziness, nausea, vomiting (2018 06:02)    HPI:  76 year old female with PMHx Diverticulitis, HTN, abdominal abcess, who presents today c/o nausea and vomiting associated with unsteady gait, tinnitus, and headache. Pt states she awoke with these sx at 9 AM and have not resolved and have gotten worse. Pt reports two similar episodes in past, doesn't remember when, but states they resolved on own. Pt states she is so dizzy, that when she tries to walk she falls to one side. Pt states room is spinning around her and medication given in ED only slightly helps. Of note, pt found to be in Afib RVR. Pt denies fever, diarrhea, constipation, gu sx, cough, numbness, tingling, loss of motor function, change in speech, visual changes, cp, sob. (2018 06:02)    Interim History: Pt rate controlled in the ED.  Seen by cardiology and neurology, felt vertigo may be due to BPV.  CT Head negative for CVA.  Started on anticoagulation.  MRI Brain showed no infarct.  MRA Head and neck showed no significant occlusion, Possible Left thyroid nodule.     Dizziness now resolved.  No weakness / numbness.  Ambulating freely.  Has returned to NSR.      ____________________PHYSICAL EXAM:  Vitals reviewed as indicated below  GENERAL:  NAD Alert and Oriented x 3   HEENT: NCAT  CARDIOVASCULAR:  RRR S1, S2  LUNGS: CTAB  ABDOMEN:  soft, (-) tenderness, (-) distension, (+) bowel sounds, (-) guarding, (-) rebound (-) rigidity  EXTREMITIES:  no cyanosis / clubbing / edema.   NEURO: Strength symmetric, sensation intact.   ____________________    VITALS:  Vital Signs Last 24 Hrs  T(C): 36.6 (2018 10:18), Max: 36.7 (2018 20:16)  T(F): 97.9 (2018 10:18), Max: 98.1 (2018 05:07)  HR: 64 (2018 10:18) (51 - 107)  BP: 150/72 (2018 10:18) (100/50 - 150/72)  BP(mean): 88 (2018 21:04) (88 - 88)  RR: 16 (2018 10:18) (14 - 18)  SpO2: 97% (2018 05:07) (97% - 100%) Daily     Daily Weight in k.1 (2018 04:01)  CAPILLARY BLOOD GLUCOSE        I&O's Summary    2018 07:01  -  2018 07:00  --------------------------------------------------------  IN: 2140 mL / OUT: 0 mL / NET: 2140 mL        LABS:              Urinalysis Basic - ( 2018 14:11 )    Color: Yellow / Appearance: Clear / S.015 / pH: x  Gluc: x / Ketone: Negative  / Bili: Negative / Urobili: Negative mg/dL   Blood: x / Protein: 30 mg/dL / Nitrite: Negative   Leuk Esterase: Trace / RBC: 0-5 /HPF / WBC 3-5   Sq Epi: x / Non Sq Epi: Few / Bacteria: Few            MEDICATIONS:  apixaban 5 milliGRAM(s) Oral every 12 hours  aspirin enteric coated 81 milliGRAM(s) Oral daily  atorvastatin 20 milliGRAM(s) Oral at bedtime  meclizine 25 milliGRAM(s) Oral two times a day PRN  metoprolol tartrate 50 milliGRAM(s) Oral two times a day  metoprolol tartrate Injectable 5 milliGRAM(s) IV Push every 6 hours PRN  ondansetron Injectable 4 milliGRAM(s) IV Push once  pantoprazole    Tablet 40 milliGRAM(s) Oral before breakfast

## 2018-06-26 NOTE — PROGRESS NOTE ADULT - SUBJECTIVE AND OBJECTIVE BOX
INTERVAL HISTORY:  asymptomatic      VITAL SIGNS:  Vital Signs Last 24 Hrs  T(C): 36.6 (26 Jun 2018 10:18), Max: 36.7 (25 Jun 2018 20:16)  T(F): 97.9 (26 Jun 2018 10:18), Max: 98.1 (26 Jun 2018 05:07)  HR: 64 (26 Jun 2018 10:18) (51 - 107)  BP: 150/72 (26 Jun 2018 10:18) (100/50 - 150/72)  BP(mean): 88 (25 Jun 2018 21:04) (88 - 88)  RR: 16 (26 Jun 2018 10:18) (14 - 18)  SpO2: 97% (26 Jun 2018 05:07) (97% - 100%)    PHYSICAL EXAMINATION:    Mentation:  nl  Language/Speech: nl  CN: nl, no nystagmus  Visual Fields: full  Motor: nl  Sensory:  DTR:  Babinski:      MEDS:  MEDICATIONS  (STANDING):  apixaban 5 milliGRAM(s) Oral every 12 hours  aspirin enteric coated 81 milliGRAM(s) Oral daily  atorvastatin 20 milliGRAM(s) Oral at bedtime  ondansetron Injectable 4 milliGRAM(s) IV Push once  pantoprazole    Tablet 40 milliGRAM(s) Oral before breakfast    MEDICATIONS  (PRN):  meclizine 25 milliGRAM(s) Oral two times a day PRN Dizziness  metoprolol tartrate Injectable 5 milliGRAM(s) IV Push every 6 hours PRN sustained HR > 130s      LABS:                  RADIOLOGY & ADDITIONAL STUDIES:    MR brain - neg  MRAs- neg    IMPRESSION & PLAN:    Dizziness/Vertigo  No evidence of CNS process-    ENT and Cardiac follow up is recommended  Will not actively follow.   Neurologically cleared for discharge/disposition.  Please recontact as needed.

## 2018-06-26 NOTE — PROGRESS NOTE ADULT - ASSESSMENT
78 yo female admitted with ataxia, vertigo, and n/v in setting of new onset afib RVR concerning for CVA/TIA.     ·  Problem: Atrial fibrillation with RVR.  - Now in NSR.  Adjusted Metoprolol due to bradycardia. . TTE showing EF 45-50%.  Continue anticoagulation.  Outpatient cardiology followup of mild cardiomyopathy and for medication titration.   ·  Problem: Ataxia.  Plan: Improving.  Likely due to BPPV.  Workup negative for CVA.  D/c neuro checks / stroke protocol.    ·  Problem: Acquired hypothyroidism.  Plan: TFTs appear to be unremarkable.  Offered pt thyroid ultrasound to assess nodule / for malignancy.  She wishes for outpatient followup with PMD, acknowledging above.   ·  Problem: Hyperglycemia.  Plan: f/u HbA1c.   ·  Problem: Essential hypertension.  Add Norvasc for BP control  DVT prophylaxis - on anticoagulation. 76 yo female admitted with ataxia, vertigo, and n/v in setting of new onset afib RVR concerning for CVA/TIA.     ·  Problem: Atrial fibrillation with RVR.  - Now in NSR.  Adjusted Metoprolol due to bradycardia. . TTE showing EF 45-50%.  Continue anticoagulation.  Outpatient cardiology followup of mild cardiomyopathy and for medication titration.   ·  Problem: Ataxia.  Plan: Improving.  Likely due to BPPV.  Workup negative for CVA.  D/c neuro checks / stroke protocol.    ·  Problem: Acquired hypothyroidism.  Plan: TFTs appear to be unremarkable.  Offered pt thyroid ultrasound to assess nodule / for malignancy.  She wishes for outpatient followup with PMD, acknowledging above.   ·  Problem: Hyperglycemia.  Plan: f/u HbA1c.   ·  Problem: Essential hypertension. BP stable	  DVT prophylaxis - on anticoagulation.

## 2018-07-17 ENCOUNTER — APPOINTMENT (OUTPATIENT)
Dept: CARDIOLOGY | Facility: CLINIC | Age: 78
End: 2018-07-17
Payer: MEDICARE

## 2018-07-17 ENCOUNTER — NON-APPOINTMENT (OUTPATIENT)
Age: 78
End: 2018-07-17

## 2018-07-17 VITALS
HEIGHT: 67 IN | DIASTOLIC BLOOD PRESSURE: 69 MMHG | HEART RATE: 57 BPM | WEIGHT: 168 LBS | OXYGEN SATURATION: 96 % | SYSTOLIC BLOOD PRESSURE: 157 MMHG | BODY MASS INDEX: 26.37 KG/M2

## 2018-07-17 VITALS — DIASTOLIC BLOOD PRESSURE: 68 MMHG | SYSTOLIC BLOOD PRESSURE: 154 MMHG

## 2018-07-17 VITALS — SYSTOLIC BLOOD PRESSURE: 156 MMHG | DIASTOLIC BLOOD PRESSURE: 70 MMHG

## 2018-07-17 PROBLEM — I10 ESSENTIAL (PRIMARY) HYPERTENSION: Chronic | Status: ACTIVE | Noted: 2018-06-24

## 2018-07-17 PROBLEM — L02.91 CUTANEOUS ABSCESS, UNSPECIFIED: Chronic | Status: ACTIVE | Noted: 2018-06-24

## 2018-07-17 PROCEDURE — 99214 OFFICE O/P EST MOD 30 MIN: CPT

## 2018-07-17 PROCEDURE — 93000 ELECTROCARDIOGRAM COMPLETE: CPT

## 2018-07-18 ENCOUNTER — MEDICATION RENEWAL (OUTPATIENT)
Age: 78
End: 2018-07-18

## 2018-07-20 ENCOUNTER — OTHER (OUTPATIENT)
Age: 78
End: 2018-07-20

## 2018-08-01 ENCOUNTER — OUTPATIENT (OUTPATIENT)
Dept: OUTPATIENT SERVICES | Facility: HOSPITAL | Age: 78
LOS: 1 days | End: 2018-08-01
Payer: COMMERCIAL

## 2018-08-01 DIAGNOSIS — R94.31 ABNORMAL ELECTROCARDIOGRAM [ECG] [EKG]: ICD-10-CM

## 2018-08-01 DIAGNOSIS — I48.0 PAROXYSMAL ATRIAL FIBRILLATION: ICD-10-CM

## 2018-08-01 PROCEDURE — 93018 CV STRESS TEST I&R ONLY: CPT

## 2018-08-01 PROCEDURE — 93016 CV STRESS TEST SUPVJ ONLY: CPT

## 2018-08-01 PROCEDURE — A9500: CPT

## 2018-08-01 PROCEDURE — 93017 CV STRESS TEST TRACING ONLY: CPT

## 2018-08-01 PROCEDURE — 78452 HT MUSCLE IMAGE SPECT MULT: CPT | Mod: 26

## 2018-08-01 PROCEDURE — 78452 HT MUSCLE IMAGE SPECT MULT: CPT

## 2018-08-06 ENCOUNTER — APPOINTMENT (OUTPATIENT)
Dept: CARDIOLOGY | Facility: CLINIC | Age: 78
End: 2018-08-06
Payer: MEDICARE

## 2018-08-06 VITALS
HEART RATE: 62 BPM | WEIGHT: 166 LBS | BODY MASS INDEX: 26.06 KG/M2 | HEIGHT: 67 IN | SYSTOLIC BLOOD PRESSURE: 162 MMHG | DIASTOLIC BLOOD PRESSURE: 81 MMHG | OXYGEN SATURATION: 99 %

## 2018-08-06 DIAGNOSIS — R42 DIZZINESS AND GIDDINESS: ICD-10-CM

## 2018-08-06 PROCEDURE — 99214 OFFICE O/P EST MOD 30 MIN: CPT

## 2018-08-15 ENCOUNTER — OTHER (OUTPATIENT)
Age: 78
End: 2018-08-15

## 2018-08-27 RX ORDER — LISINOPRIL 10 MG/1
10 TABLET ORAL DAILY
Qty: 30 | Refills: 3 | Status: ACTIVE | COMMUNITY
Start: 2018-08-27 | End: 1900-01-01

## 2018-11-19 ENCOUNTER — APPOINTMENT (OUTPATIENT)
Dept: CARDIOLOGY | Facility: CLINIC | Age: 78
End: 2018-11-19

## 2018-12-06 ENCOUNTER — APPOINTMENT (OUTPATIENT)
Dept: ELECTROPHYSIOLOGY | Facility: CLINIC | Age: 78
End: 2018-12-06

## 2018-12-06 ENCOUNTER — APPOINTMENT (OUTPATIENT)
Dept: CARDIOLOGY | Facility: CLINIC | Age: 78
End: 2018-12-06

## 2019-03-04 NOTE — ED ADULT NURSE NOTE - CADM POA URETHRAL CATHETER
PT WANTS A CALL BACK SHE IS SICK HAS ST FEVER BLISTER A LOT OF FLEM  PT WAS OFFERED AN APPT AND SAID SHE IS TO SICK TO COME OUT AND SHE HAS NO ONE TO BRING HER CALL -[phone] PT GOES TO [facility] No

## 2019-06-01 ENCOUNTER — EMERGENCY (EMERGENCY)
Facility: HOSPITAL | Age: 79
LOS: 1 days | Discharge: HOME | End: 2019-06-01
Attending: EMERGENCY MEDICINE
Payer: COMMERCIAL

## 2019-06-01 VITALS
DIASTOLIC BLOOD PRESSURE: 87 MMHG | WEIGHT: 160.06 LBS | SYSTOLIC BLOOD PRESSURE: 165 MMHG | RESPIRATION RATE: 20 BRPM | HEART RATE: 61 BPM | HEIGHT: 67 IN | TEMPERATURE: 98 F | OXYGEN SATURATION: 100 %

## 2019-06-01 PROCEDURE — 99284 EMERGENCY DEPT VISIT MOD MDM: CPT | Mod: 25

## 2019-06-01 PROCEDURE — 93971 EXTREMITY STUDY: CPT

## 2019-06-01 PROCEDURE — 99284 EMERGENCY DEPT VISIT MOD MDM: CPT

## 2019-06-01 PROCEDURE — 93971 EXTREMITY STUDY: CPT | Mod: 26,LT

## 2019-06-01 NOTE — ED ADULT NURSE NOTE - OBJECTIVE STATEMENT
Pt A&Ox4, sent by PCP for r/o DVT, pt c/o left calf pain that started yesterday while walking pt states she has no pain when sitting  pt denies injury, fever/chills, redness, swelling, sob, chest pain

## 2019-06-01 NOTE — ED STATDOCS - MUSCULOSKELETAL, MLM
tenderness in mid posterior calf, no bruising. Left DP 2+. mild tenderness in mid posterior calf, no bruising, no palpable cords. Left DP 2+.

## 2019-06-01 NOTE — ED STATDOCS - OBJECTIVE STATEMENT
79 y/o F pt with hx of HTN presents to ED c/o left sided calf pain x 1 day. Indicates the pain onset while walking. Is not present with sitting. Does not remember injuring it. Was sent to ED to r/o DVT. No numbness or tingling in left foot. No history of prior clots. No CP or SOB. Is a non smoker. No further complaints at this time.

## 2019-06-01 NOTE — ED ADULT NURSE NOTE - BREATH SOUNDS, MLM
DISCHARGE INSTRUCTIONS    Name: August Sen  YOB: [phone]      Signed By: Khadijah Boothe MD                                                                                                   Date: 2019 Time: 10:30 AM Clear

## 2019-06-01 NOTE — ED STATDOCS - CLINICAL SUMMARY MEDICAL DECISION MAKING FREE TEXT BOX
Patient preset 1 day of left calf pain onset while walking. No evidence of vascular compromise, no swelling or erythema; will check US to r/o DVt. Patient advised to return for repeat US in 1 week if symptoms persist. Will defer blood work at this time, unless US is positive. Patient present 1 day of left calf pain onset while walking. No evidence of vascular compromise, no swelling or erythema; will check US to r/o DVT. Patient advised to return for repeat US in 1 week if symptoms persist. Will defer blood work at this time, unless US is positive.

## 2019-06-01 NOTE — ED STATDOCS - PROGRESS NOTE DETAILS
PT seen by intake MD and agree with HPI/ROS/Pe/Plan. Pt has no DVT will have pt f/u in 1 week with PCP.

## 2019-10-02 NOTE — H&P ADULT - PROBLEM/PLAN-1
Tetracycline Counseling: Patient counseled regarding possible photosensitivity and increased risk for sunburn.  Patient instructed to avoid sunlight, if possible.  When exposed to sunlight, patients should wear protective clothing, sunglasses, and sunscreen.  The patient was instructed to call the office immediately if the following severe adverse effects occur:  hearing changes, easy bruising/bleeding, severe headache, or vision changes.  The patient verbalized understanding of the proper use and possible adverse effects of tetracycline.  All of the patient's questions and concerns were addressed. Patient understands to avoid pregnancy while on therapy due to potential birth defects. Tazorac Counseling:  Patient advised that medication is irritating and drying.  Patient may need to apply sparingly and wash off after an hour before eventually leaving it on overnight.  The patient verbalized understanding of the proper use and possible adverse effects of tazorac.  All of the patient's questions and concerns were addressed. Azithromycin Pregnancy And Lactation Text: This medication is considered safe during pregnancy and is also secreted in breast milk. Topical Clindamycin Counseling: Patient counseled that this medication may cause skin irritation or allergic reactions.  In the event of skin irritation, the patient was advised to reduce the amount of the drug applied or use it less frequently.   The patient verbalized understanding of the proper use and possible adverse effects of clindamycin.  All of the patient's questions and concerns were addressed. Spironolactone Pregnancy And Lactation Text: This medication can cause feminization of the male fetus and should be avoided during pregnancy. The active metabolite is also found in breast milk. Benzoyl Peroxide Pregnancy And Lactation Text: This medication is Pregnancy Category C. It is unknown if benzoyl peroxide is excreted in breast milk. Tetracycline Pregnancy And Lactation Text: This medication is Pregnancy Category D and not consider safe during pregnancy. It is also excreted in breast milk. Dapsone Pregnancy And Lactation Text: This medication is Pregnancy Category C and is not considered safe during pregnancy or breast feeding. High Dose Vitamin A Counseling: Side effects reviewed, pt to contact office should one occur. Topical Retinoid Pregnancy And Lactation Text: This medication is Pregnancy Category C. It is unknown if this medication is excreted in breast milk. Minocycline Counseling: Patient advised regarding possible photosensitivity and discoloration of the teeth, skin, lips, tongue and gums.  Patient instructed to avoid sunlight, if possible.  When exposed to sunlight, patients should wear protective clothing, sunglasses, and sunscreen.  The patient was instructed to call the office immediately if the following severe adverse effects occur:  hearing changes, easy bruising/bleeding, severe headache, or vision changes.  The patient verbalized understanding of the proper use and possible adverse effects of minocycline.  All of the patient's questions and concerns were addressed. Dapsone Counseling: I discussed with the patient the risks of dapsone including but not limited to hemolytic anemia, agranulocytosis, rashes, methemoglobinemia, kidney failure, peripheral neuropathy, headaches, GI upset, and liver toxicity.  Patients who start dapsone require monitoring including baseline LFTs and weekly CBCs for the first month, then every month thereafter.  The patient verbalized understanding of the proper use and possible adverse effects of dapsone.  All of the patient's questions and concerns were addressed. Bactrim Pregnancy And Lactation Text: This medication is Pregnancy Category D and is known to cause fetal risk.  It is also excreted in breast milk. Bactrim Counseling:  I discussed with the patient the risks of sulfa antibiotics including but not limited to GI upset, allergic reaction, drug rash, diarrhea, dizziness, photosensitivity, and yeast infections.  Rarely, more serious reactions can occur including but not limited to aplastic anemia, agranulocytosis, methemoglobinemia, blood dyscrasias, liver or kidney failure, lung infiltrates or desquamative/blistering drug rashes. Doxycycline Pregnancy And Lactation Text: This medication is Pregnancy Category D and not consider safe during pregnancy. It is also excreted in breast milk but is considered safe for shorter treatment courses. Spironolactone Counseling: Patient advised regarding risks of diarrhea, abdominal pain, hyperkalemia, birth defects (for female patients), liver toxicity and renal toxicity. The patient may need blood work to monitor liver and kidney function and potassium levels while on therapy. The patient verbalized understanding of the proper use and possible adverse effects of spironolactone.  All of the patient's questions and concerns were addressed. Topical Sulfur Applications Pregnancy And Lactation Text: This medication is Pregnancy Category C and has an unknown safety profile during pregnancy. It is unknown if this topical medication is excreted in breast milk. Topical Retinoid counseling:  Patient advised to apply a pea-sized amount only at bedtime and wait 30 minutes after washing their face before applying.  If too drying, patient may add a non-comedogenic moisturizer. The patient verbalized understanding of the proper use and possible adverse effects of retinoids.  All of the patient's questions and concerns were addressed. Doxycycline Counseling:  Patient counseled regarding possible photosensitivity and increased risk for sunburn.  Patient instructed to avoid sunlight, if possible.  When exposed to sunlight, patients should wear protective clothing, sunglasses, and sunscreen.  The patient was instructed to call the office immediately if the following severe adverse effects occur:  hearing changes, easy bruising/bleeding, severe headache, or vision changes.  The patient verbalized understanding of the proper use and possible adverse effects of doxycycline.  All of the patient's questions and concerns were addressed. Birth Control Pills Counseling: Birth Control Pill Counseling: I discussed with the patient the potential side effects of OCPs including but not limited to increased risk of stroke, heart attack, thrombophlebitis, deep venous thrombosis, hepatic adenomas, breast changes, GI upset, headaches, and depression.  The patient verbalized understanding of the proper use and possible adverse effects of OCPs. All of the patient's questions and concerns were addressed. Isotretinoin Pregnancy And Lactation Text: This medication is Pregnancy Category X and is considered extremely dangerous during pregnancy. It is unknown if it is excreted in breast milk. Tazorac Pregnancy And Lactation Text: This medication is not safe during pregnancy. It is unknown if this medication is excreted in breast milk. Detail Level: Simple DISPLAY PLAN FREE TEXT Topical Sulfur Applications Counseling: Topical Sulfur Counseling: Patient counseled that this medication may cause skin irritation or allergic reactions.  In the event of skin irritation, the patient was advised to reduce the amount of the drug applied or use it less frequently.   The patient verbalized understanding of the proper use and possible adverse effects of topical sulfur application.  All of the patient's questions and concerns were addressed. Topical Clindamycin Pregnancy And Lactation Text: This medication is Pregnancy Category B and is considered safe during pregnancy. It is unknown if it is excreted in breast milk. Benzoyl Peroxide Counseling: Patient counseled that medicine may cause skin irritation and bleach clothing.  In the event of skin irritation, the patient was advised to reduce the amount of the drug applied or use it less frequently.   The patient verbalized understanding of the proper use and possible adverse effects of benzoyl peroxide.  All of the patient's questions and concerns were addressed. High Dose Vitamin A Pregnancy And Lactation Text: High dose vitamin A therapy is contraindicated during pregnancy and breast feeding. Isotretinoin Counseling: Patient should get monthly blood tests, not donate blood, not drive at night if vision affected, not share medication, and not undergo elective surgery for 6 months after tx completed. Side effects reviewed, pt to contact office should one occur. Erythromycin Pregnancy And Lactation Text: This medication is Pregnancy Category B and is considered safe during pregnancy. It is also excreted in breast milk. Birth Control Pills Pregnancy And Lactation Text: This medication should be avoided if pregnant and for the first 30 days post-partum. Erythromycin Counseling:  I discussed with the patient the risks of erythromycin including but not limited to GI upset, allergic reaction, drug rash, diarrhea, increase in liver enzymes, and yeast infections. Azithromycin Counseling:  I discussed with the patient the risks of azithromycin including but not limited to GI upset, allergic reaction, drug rash, diarrhea, and yeast infections. Use Enhanced Medication Counseling?: No Detail Level: Zone

## 2019-12-03 ENCOUNTER — EMERGENCY (EMERGENCY)
Facility: HOSPITAL | Age: 79
LOS: 1 days | Discharge: HOME | End: 2019-12-03
Attending: EMERGENCY MEDICINE
Payer: COMMERCIAL

## 2019-12-03 VITALS
WEIGHT: 162.04 LBS | DIASTOLIC BLOOD PRESSURE: 82 MMHG | TEMPERATURE: 98 F | RESPIRATION RATE: 20 BRPM | OXYGEN SATURATION: 100 % | SYSTOLIC BLOOD PRESSURE: 177 MMHG | HEIGHT: 66 IN | HEART RATE: 67 BPM

## 2019-12-03 LAB
ALBUMIN SERPL ELPH-MCNC: 4.2 G/DL — SIGNIFICANT CHANGE UP (ref 3.3–5.2)
ALP SERPL-CCNC: 82 U/L — SIGNIFICANT CHANGE UP (ref 40–120)
ALT FLD-CCNC: 15 U/L — SIGNIFICANT CHANGE UP
ANION GAP SERPL CALC-SCNC: 19 MMOL/L — HIGH (ref 5–17)
AST SERPL-CCNC: 23 U/L — SIGNIFICANT CHANGE UP
BASOPHILS # BLD AUTO: 0.06 K/UL — SIGNIFICANT CHANGE UP (ref 0–0.2)
BASOPHILS NFR BLD AUTO: 0.6 % — SIGNIFICANT CHANGE UP (ref 0–2)
BILIRUB SERPL-MCNC: 0.5 MG/DL — SIGNIFICANT CHANGE UP (ref 0.4–2)
BLD GP AB SCN SERPL QL: SIGNIFICANT CHANGE UP
BUN SERPL-MCNC: 17 MG/DL — SIGNIFICANT CHANGE UP (ref 8–20)
CALCIUM SERPL-MCNC: 9.2 MG/DL — SIGNIFICANT CHANGE UP (ref 8.6–10.2)
CHLORIDE SERPL-SCNC: 101 MMOL/L — SIGNIFICANT CHANGE UP (ref 98–107)
CO2 SERPL-SCNC: 18 MMOL/L — LOW (ref 22–29)
CREAT SERPL-MCNC: 0.78 MG/DL — SIGNIFICANT CHANGE UP (ref 0.5–1.3)
EOSINOPHIL # BLD AUTO: 0.04 K/UL — SIGNIFICANT CHANGE UP (ref 0–0.5)
EOSINOPHIL NFR BLD AUTO: 0.4 % — SIGNIFICANT CHANGE UP (ref 0–6)
GLUCOSE SERPL-MCNC: 85 MG/DL — SIGNIFICANT CHANGE UP (ref 70–115)
HCT VFR BLD CALC: 38.7 % — SIGNIFICANT CHANGE UP (ref 34.5–45)
HGB BLD-MCNC: 12.4 G/DL — SIGNIFICANT CHANGE UP (ref 11.5–15.5)
IMM GRANULOCYTES NFR BLD AUTO: 0.2 % — SIGNIFICANT CHANGE UP (ref 0–1.5)
LIDOCAIN IGE QN: 16 U/L — LOW (ref 22–51)
LYMPHOCYTES # BLD AUTO: 1.39 K/UL — SIGNIFICANT CHANGE UP (ref 1–3.3)
LYMPHOCYTES # BLD AUTO: 13.8 % — SIGNIFICANT CHANGE UP (ref 13–44)
MCHC RBC-ENTMCNC: 30 PG — SIGNIFICANT CHANGE UP (ref 27–34)
MCHC RBC-ENTMCNC: 32 GM/DL — SIGNIFICANT CHANGE UP (ref 32–36)
MCV RBC AUTO: 93.5 FL — SIGNIFICANT CHANGE UP (ref 80–100)
MONOCYTES # BLD AUTO: 0.56 K/UL — SIGNIFICANT CHANGE UP (ref 0–0.9)
MONOCYTES NFR BLD AUTO: 5.6 % — SIGNIFICANT CHANGE UP (ref 2–14)
NEUTROPHILS # BLD AUTO: 8.02 K/UL — HIGH (ref 1.8–7.4)
NEUTROPHILS NFR BLD AUTO: 79.4 % — HIGH (ref 43–77)
PLATELET # BLD AUTO: 329 K/UL — SIGNIFICANT CHANGE UP (ref 150–400)
POTASSIUM SERPL-MCNC: 4.6 MMOL/L — SIGNIFICANT CHANGE UP (ref 3.5–5.3)
POTASSIUM SERPL-SCNC: 4.6 MMOL/L — SIGNIFICANT CHANGE UP (ref 3.5–5.3)
PROT SERPL-MCNC: 7.4 G/DL — SIGNIFICANT CHANGE UP (ref 6.6–8.7)
RBC # BLD: 4.14 M/UL — SIGNIFICANT CHANGE UP (ref 3.8–5.2)
RBC # FLD: 11.9 % — SIGNIFICANT CHANGE UP (ref 10.3–14.5)
SODIUM SERPL-SCNC: 138 MMOL/L — SIGNIFICANT CHANGE UP (ref 135–145)
WBC # BLD: 10.09 K/UL — SIGNIFICANT CHANGE UP (ref 3.8–10.5)
WBC # FLD AUTO: 10.09 K/UL — SIGNIFICANT CHANGE UP (ref 3.8–10.5)

## 2019-12-03 PROCEDURE — 96374 THER/PROPH/DIAG INJ IV PUSH: CPT | Mod: XU

## 2019-12-03 PROCEDURE — 99284 EMERGENCY DEPT VISIT MOD MDM: CPT | Mod: 25

## 2019-12-03 PROCEDURE — 86901 BLOOD TYPING SEROLOGIC RH(D): CPT

## 2019-12-03 PROCEDURE — 80053 COMPREHEN METABOLIC PANEL: CPT

## 2019-12-03 PROCEDURE — 36415 COLL VENOUS BLD VENIPUNCTURE: CPT

## 2019-12-03 PROCEDURE — 86900 BLOOD TYPING SEROLOGIC ABO: CPT

## 2019-12-03 PROCEDURE — 83690 ASSAY OF LIPASE: CPT

## 2019-12-03 PROCEDURE — 99284 EMERGENCY DEPT VISIT MOD MDM: CPT

## 2019-12-03 PROCEDURE — 74177 CT ABD & PELVIS W/CONTRAST: CPT | Mod: 26

## 2019-12-03 PROCEDURE — 74177 CT ABD & PELVIS W/CONTRAST: CPT

## 2019-12-03 PROCEDURE — 86850 RBC ANTIBODY SCREEN: CPT

## 2019-12-03 PROCEDURE — 85027 COMPLETE CBC AUTOMATED: CPT

## 2019-12-03 RX ORDER — ONDANSETRON 8 MG/1
1 TABLET, FILM COATED ORAL
Qty: 9 | Refills: 0
Start: 2019-12-03 | End: 2019-12-05

## 2019-12-03 RX ORDER — SODIUM CHLORIDE 9 MG/ML
1000 INJECTION INTRAMUSCULAR; INTRAVENOUS; SUBCUTANEOUS ONCE
Refills: 0 | Status: COMPLETED | OUTPATIENT
Start: 2019-12-03 | End: 2019-12-03

## 2019-12-03 RX ORDER — ONDANSETRON 8 MG/1
4 TABLET, FILM COATED ORAL ONCE
Refills: 0 | Status: COMPLETED | OUTPATIENT
Start: 2019-12-03 | End: 2019-12-03

## 2019-12-03 RX ADMIN — ONDANSETRON 4 MILLIGRAM(S): 8 TABLET, FILM COATED ORAL at 15:56

## 2019-12-03 RX ADMIN — SODIUM CHLORIDE 1000 MILLILITER(S): 9 INJECTION INTRAMUSCULAR; INTRAVENOUS; SUBCUTANEOUS at 15:50

## 2019-12-03 NOTE — ED STATDOCS - ATTENDING CONTRIBUTION TO CARE
Nima: I performed a face to face bedside interview with patient regarding history of present illness, review of symptoms and past medical history. I completed an independent physical exam and ordered tests/medications as needed.  I have discussed patient's plan of care with advanced care provider. The advanced care provider assisted in  executing the discussed plan. I was available for any questions or issues that may have arose during the execution of the plan of care.

## 2019-12-03 NOTE — ED STATDOCS - PATIENT PORTAL LINK FT
You can access the FollowMyHealth Patient Portal offered by [facility] by registering at the following website: [email or web address]. By joining [facility]’s FollowMyHealth portal, you will also be able to view your health information using other applications (apps) compatible with our system.

## 2019-12-03 NOTE — ED STATDOCS - PROGRESS NOTE DETAILS
78 year old female with PMHx diverticulitis presents to the ED for LLQ abd pain. Already taking Cipro and Flagyl. HPI/ ROS/ PEx as stated above. Labs, CT abd ordered. CT reveals diverticulitis. Pt currently tolerating PO. Pt with slight anion gap, and is refusing additional liter of fluids. Will d/c with Zofran. Advised pt to continue taking abx. No need for IV abx at this time as pt has only taken a few doses of her oral medications. VSS. Return precautions provided.

## 2019-12-03 NOTE — ED STATDOCS - OBJECTIVE STATEMENT
79 y/o F pt with significant PMHx of diverticulitis and HTN presents to the ED c/o abdominal pain with associated nausea that began on Sunday night. She states that she has not eaten since onset. She visited her PCP yesterday but was told they could only diagnose diverticulitis with a CT. Pt denies fever, blood in stool, diarrhea, black or bloody stool. Pt states this feels like the last time she was Dx with diverticulitis. No further complaints at this time. 77 y/o F pt with significant PMHx of diverticulitis and HTN presents to the ED c/o lower abdominal pain since Sunday, nausea since yesterday. She states that she has not eaten since Sunday. She visited her PCP yesterday but was told they could only diagnose diverticulitis with a CT, was started on cipro/flagyl. Pt denies fever, dysuria, hematuria, diarrhea, black or bloody stool. Pt states this feels like the last time she was Dx with diverticulitis. No further complaints at this time. Does have h/o diverticular abscess.

## 2019-12-03 NOTE — ED ADULT TRIAGE NOTE - CHIEF COMPLAINT QUOTE
"I think I am having a diverticulitis flare up. " Pt c/o stomach pain unable to eat since Sunday. Pt did  not take BP meds this morning

## 2019-12-06 ENCOUNTER — EMERGENCY (EMERGENCY)
Facility: HOSPITAL | Age: 79
LOS: 1 days | Discharge: HOME | End: 2019-12-06
Attending: EMERGENCY MEDICINE
Payer: COMMERCIAL

## 2019-12-06 VITALS
TEMPERATURE: 98 F | OXYGEN SATURATION: 99 % | HEIGHT: 66 IN | SYSTOLIC BLOOD PRESSURE: 199 MMHG | HEART RATE: 82 BPM | DIASTOLIC BLOOD PRESSURE: 91 MMHG | WEIGHT: 162.04 LBS | RESPIRATION RATE: 20 BRPM

## 2019-12-06 VITALS
SYSTOLIC BLOOD PRESSURE: 184 MMHG | TEMPERATURE: 98 F | HEART RATE: 84 BPM | RESPIRATION RATE: 18 BRPM | OXYGEN SATURATION: 97 % | DIASTOLIC BLOOD PRESSURE: 80 MMHG

## 2019-12-06 LAB
ALBUMIN SERPL ELPH-MCNC: 4.1 G/DL — SIGNIFICANT CHANGE UP (ref 3.3–5.2)
ALP SERPL-CCNC: 77 U/L — SIGNIFICANT CHANGE UP (ref 40–120)
ALT FLD-CCNC: 28 U/L — SIGNIFICANT CHANGE UP
ANION GAP SERPL CALC-SCNC: 18 MMOL/L — HIGH (ref 5–17)
APPEARANCE UR: CLEAR — SIGNIFICANT CHANGE UP
AST SERPL-CCNC: 49 U/L — HIGH
BACTERIA # UR AUTO: NEGATIVE — SIGNIFICANT CHANGE UP
BASOPHILS # BLD AUTO: 0.06 K/UL — SIGNIFICANT CHANGE UP (ref 0–0.2)
BASOPHILS NFR BLD AUTO: 0.9 % — SIGNIFICANT CHANGE UP (ref 0–2)
BILIRUB SERPL-MCNC: 0.4 MG/DL — SIGNIFICANT CHANGE UP (ref 0.4–2)
BILIRUB UR-MCNC: NEGATIVE — SIGNIFICANT CHANGE UP
BUN SERPL-MCNC: 14 MG/DL — SIGNIFICANT CHANGE UP (ref 8–20)
CALCIUM SERPL-MCNC: 9.1 MG/DL — SIGNIFICANT CHANGE UP (ref 8.6–10.2)
CHLORIDE SERPL-SCNC: 101 MMOL/L — SIGNIFICANT CHANGE UP (ref 98–107)
CO2 SERPL-SCNC: 19 MMOL/L — LOW (ref 22–29)
COLOR SPEC: YELLOW — SIGNIFICANT CHANGE UP
CREAT SERPL-MCNC: 0.75 MG/DL — SIGNIFICANT CHANGE UP (ref 0.5–1.3)
DIFF PNL FLD: ABNORMAL
EOSINOPHIL # BLD AUTO: 0.09 K/UL — SIGNIFICANT CHANGE UP (ref 0–0.5)
EOSINOPHIL NFR BLD AUTO: 1.4 % — SIGNIFICANT CHANGE UP (ref 0–6)
EPI CELLS # UR: SIGNIFICANT CHANGE UP
GLUCOSE SERPL-MCNC: 98 MG/DL — SIGNIFICANT CHANGE UP (ref 70–115)
GLUCOSE UR QL: NEGATIVE MG/DL — SIGNIFICANT CHANGE UP
HCT VFR BLD CALC: 38.5 % — SIGNIFICANT CHANGE UP (ref 34.5–45)
HGB BLD-MCNC: 12.7 G/DL — SIGNIFICANT CHANGE UP (ref 11.5–15.5)
IMM GRANULOCYTES NFR BLD AUTO: 0.3 % — SIGNIFICANT CHANGE UP (ref 0–1.5)
KETONES UR-MCNC: ABNORMAL
LEUKOCYTE ESTERASE UR-ACNC: ABNORMAL
LIDOCAIN IGE QN: 19 U/L — LOW (ref 22–51)
LYMPHOCYTES # BLD AUTO: 1.7 K/UL — SIGNIFICANT CHANGE UP (ref 1–3.3)
LYMPHOCYTES # BLD AUTO: 25.7 % — SIGNIFICANT CHANGE UP (ref 13–44)
MCHC RBC-ENTMCNC: 30.5 PG — SIGNIFICANT CHANGE UP (ref 27–34)
MCHC RBC-ENTMCNC: 33 GM/DL — SIGNIFICANT CHANGE UP (ref 32–36)
MCV RBC AUTO: 92.5 FL — SIGNIFICANT CHANGE UP (ref 80–100)
MONOCYTES # BLD AUTO: 0.67 K/UL — SIGNIFICANT CHANGE UP (ref 0–0.9)
MONOCYTES NFR BLD AUTO: 10.1 % — SIGNIFICANT CHANGE UP (ref 2–14)
NEUTROPHILS # BLD AUTO: 4.08 K/UL — SIGNIFICANT CHANGE UP (ref 1.8–7.4)
NEUTROPHILS NFR BLD AUTO: 61.6 % — SIGNIFICANT CHANGE UP (ref 43–77)
NITRITE UR-MCNC: NEGATIVE — SIGNIFICANT CHANGE UP
PH UR: 5 — SIGNIFICANT CHANGE UP (ref 5–8)
PLATELET # BLD AUTO: 345 K/UL — SIGNIFICANT CHANGE UP (ref 150–400)
POTASSIUM SERPL-MCNC: 3.8 MMOL/L — SIGNIFICANT CHANGE UP (ref 3.5–5.3)
POTASSIUM SERPL-SCNC: 3.8 MMOL/L — SIGNIFICANT CHANGE UP (ref 3.5–5.3)
PROT SERPL-MCNC: 7.5 G/DL — SIGNIFICANT CHANGE UP (ref 6.6–8.7)
PROT UR-MCNC: 15 MG/DL
RBC # BLD: 4.16 M/UL — SIGNIFICANT CHANGE UP (ref 3.8–5.2)
RBC # FLD: 11.9 % — SIGNIFICANT CHANGE UP (ref 10.3–14.5)
RBC CASTS # UR COMP ASSIST: SIGNIFICANT CHANGE UP /HPF (ref 0–4)
SODIUM SERPL-SCNC: 138 MMOL/L — SIGNIFICANT CHANGE UP (ref 135–145)
SP GR SPEC: 1.02 — SIGNIFICANT CHANGE UP (ref 1.01–1.02)
UROBILINOGEN FLD QL: NEGATIVE MG/DL — SIGNIFICANT CHANGE UP
WBC # BLD: 6.62 K/UL — SIGNIFICANT CHANGE UP (ref 3.8–10.5)
WBC # FLD AUTO: 6.62 K/UL — SIGNIFICANT CHANGE UP (ref 3.8–10.5)
WBC UR QL: SIGNIFICANT CHANGE UP

## 2019-12-06 PROCEDURE — 36415 COLL VENOUS BLD VENIPUNCTURE: CPT

## 2019-12-06 PROCEDURE — 96374 THER/PROPH/DIAG INJ IV PUSH: CPT | Mod: XU

## 2019-12-06 PROCEDURE — 74177 CT ABD & PELVIS W/CONTRAST: CPT | Mod: 26

## 2019-12-06 PROCEDURE — 99284 EMERGENCY DEPT VISIT MOD MDM: CPT

## 2019-12-06 PROCEDURE — 74177 CT ABD & PELVIS W/CONTRAST: CPT

## 2019-12-06 PROCEDURE — 81001 URINALYSIS AUTO W/SCOPE: CPT

## 2019-12-06 PROCEDURE — 80053 COMPREHEN METABOLIC PANEL: CPT

## 2019-12-06 PROCEDURE — 85027 COMPLETE CBC AUTOMATED: CPT

## 2019-12-06 PROCEDURE — 83690 ASSAY OF LIPASE: CPT

## 2019-12-06 PROCEDURE — 99284 EMERGENCY DEPT VISIT MOD MDM: CPT | Mod: 25

## 2019-12-06 RX ORDER — ONDANSETRON 8 MG/1
4 TABLET, FILM COATED ORAL ONCE
Refills: 0 | Status: COMPLETED | OUTPATIENT
Start: 2019-12-06 | End: 2019-12-06

## 2019-12-06 RX ORDER — METOPROLOL TARTRATE 50 MG
25 TABLET ORAL ONCE
Refills: 0 | Status: COMPLETED | OUTPATIENT
Start: 2019-12-06 | End: 2019-12-06

## 2019-12-06 RX ADMIN — ONDANSETRON 4 MILLIGRAM(S): 8 TABLET, FILM COATED ORAL at 15:47

## 2019-12-06 RX ADMIN — Medication 25 MILLIGRAM(S): at 15:09

## 2019-12-06 NOTE — ED STATDOCS - OBJECTIVE STATEMENT
80 y/o F pt with significant PMHx of hypertension presents to the ED complaining of nausea. Patient states she was here on 12/3 and was diagnosed with diverticulitis. Patient states she has difficulty eating/drinking due to nausea and intermittent abdominal pain. Patient reports multiple episodes of vomiting and diarrhea, last episodes 3 days ago. Patient has not taken her blood pressure medication today. Non Smoker. Hx of appendectomy. 78 y/o F pt with significant PMHx of hypertension presents to the ED complaining of nausea. Patient states she was here on 12/3 and was diagnosed with diverticulitis. Patient states she has difficulty eating/drinking due to nausea and intermittent abdominal pain (comes and goes). Patient reports cills on Wednesday, diarrhea on wednesday.  Last episode of vomiting also on Wednesday.  Patient has not taken her blood pressure medication today. Non Smoker. Hx of appendectomy.  PMD: Leona.

## 2019-12-06 NOTE — ED ADULT NURSE NOTE - CHPI ED NUR SYMPTOMS NEG
no fever/no hematuria/no burning urination/no chills/no abdominal distension/no dysuria/no diarrhea/no blood in stool

## 2019-12-06 NOTE — ED STATDOCS - CLINICAL SUMMARY MEDICAL DECISION MAKING FREE TEXT BOX
Recently diagnosed diverticulitis with persistent symptoms, on oral antibiotics. Will repeat labs and CT.

## 2019-12-06 NOTE — ED STATDOCS - GASTROINTESTINAL, MLM
abdomen soft and non-distended. dull to percussion. Left sided abdominal tenderness without rebound or guarding.

## 2019-12-06 NOTE — ED ADULT NURSE NOTE - OBJECTIVE STATEMENT
pt reports lower abdominal pain, nausea, vomiting. reports she was here recently and dx with diverticulitis. reports was sent home on abx but she feels that her symptoms are becoming more severe. a and o x3. breathing even and unlabored. sitting calm in bed. will continue to monitor.

## 2019-12-06 NOTE — ED STATDOCS - PATIENT PORTAL LINK FT
You can access the FollowMyHealth Patient Portal offered by [facility] by registering at the following website: [email or web address]. By joining [facility]’s FollowMyHealth portal, you will also be able to view your health information using other applications (apps) compatible with our system.

## 2019-12-06 NOTE — ED ADULT NURSE NOTE - NSIMPLEMENTINTERV_GEN_ALL_ED
Implemented All Universal Safety Interventions:  [place] to call system. Call bell, personal items and telephone within reach. Instruct patient to call for assistance. Room bathroom lighting operational. Non-slip footwear when patient is off stretcher. Physically safe environment: no spills, clutter or unnecessary equipment. Stretcher in lowest position, wheels locked, appropriate side rails in place.

## 2019-12-06 NOTE — ED STATDOCS - PROGRESS NOTE DETAILS
Labs noted, pending CT, will follow and reeval Pt with LLQ abd pain and nausea x 5 days- Initially started on Cipro+Flagyl by PMD- CT done 3 days ago c/w mild diverticulitis.  Pt dc home with zofran and continued oral abx with persistent pain and nausea. Labs/CT ordered by Intake MD. Labs noted, pending CT, will follow and reeval Pt is an 80 yo F with PMH HTN, Diverticulitis with abscess presented to ED with LLQ abd pain and nausea x 5 days- Initially started on Cipro + Flagyl by PMD- CT done 3 days ago c/w mild diverticulitis.  Pt dc home with zofran and continued oral abx with persistent pain and nausea. Labs/CT ordered by Intake MD. Labs noted, pending CT, will follow and reeval PA note: Pt feeling well at this time, CT showing resolution of diverticulitis with no abscess. Pt tolerating po intake. Pt provided copy of results and instructed to f/u PMD. Return precautions discussed. VSS, in NAD, stable for dc at this time.

## 2019-12-17 PROBLEM — K57.32 DIVERTICULITIS OF COLON: Status: ACTIVE | Noted: 2019-12-17

## 2019-12-18 ENCOUNTER — APPOINTMENT (OUTPATIENT)
Dept: SURGICAL ONCOLOGY | Facility: CLINIC | Age: 79
End: 2019-12-18
Payer: MEDICARE

## 2019-12-18 VITALS
WEIGHT: 160 LBS | SYSTOLIC BLOOD PRESSURE: 130 MMHG | HEIGHT: 66.5 IN | HEART RATE: 69 BPM | BODY MASS INDEX: 25.41 KG/M2 | OXYGEN SATURATION: 99 % | DIASTOLIC BLOOD PRESSURE: 75 MMHG

## 2019-12-18 DIAGNOSIS — K57.32 DIVERTICULITIS OF LARGE INTESTINE W/OUT PERFORATION OR ABSCESS W/OUT BLEEDING: ICD-10-CM

## 2019-12-18 PROCEDURE — 99205 OFFICE O/P NEW HI 60 MIN: CPT

## 2019-12-18 RX ORDER — APIXABAN 5 MG/1
5 TABLET, FILM COATED ORAL
Qty: 60 | Refills: 0 | Status: DISCONTINUED | COMMUNITY
End: 2019-12-18

## 2019-12-18 RX ORDER — ATORVASTATIN CALCIUM 20 MG/1
20 TABLET, FILM COATED ORAL
Qty: 30 | Refills: 3 | Status: DISCONTINUED | COMMUNITY
End: 2019-12-18

## 2019-12-18 NOTE — HISTORY OF PRESENT ILLNESS
[de-identified] : 79 year-old female presents for an initial consultation.  She has a history of diverticulitis with first episode approximately 10 years ago which was complicated by abscess formation.  She underwent surgery for management of the abscess but did not require a colon resection at that time.  She suffered a second episode a few years later which was treated with inpatient IV antibiotics.   She presented to [facility] ED on 12/3/19 with left lower quadrant pain with CT demonstrated mild acute diverticulitis of the proximal sigmoid colon.  She was discharged home on oral antibiotics but was having difficulty taking the pills due to nausea.  She was concerned because she felt she was not improving, and she returned to ED on 12/6/19 and repeat CT demonstrated resolution of diverticulitis.  She remained afebrile throughout the entire course. \par \par Her past medical history includes atrial fibrillation (on aspirin 325 mg daily as she had declined Eliquis), HTN, and vertigo.  She will be establishing care with a new cardiologist in the near future.   Past surgical history includes an open appendectomy in the remote past.  She denies any family history of malignancies.  She does not smoke or drink alcohol.  \par \par She completed antibiotics 3 days ago.  She feels fatigued and her appetite is diminished but she remains afebrile and denies nausea or vomiting.  She was experiencing loose stools previously but her bowel habits are starting to return to baseline. \par \par PCP/Referring MD: Dr. Jw Delgadillo

## 2019-12-18 NOTE — DISCHARGE NOTE ADULT - PAIN PRESENT
Was the patient seen in the last year in this department? Yes 11/21/19    Does patient have an active prescription for medications requested? No     Received Request Via: Pharmacy  
No

## 2019-12-18 NOTE — CONSULT LETTER
[Dear  ___] : Dear  [unfilled], [Consult Closing:] : Thank you very much for allowing me to participate in the care of this patient.  If you have any questions, please do not hesitate to contact me. [Consult Letter:] : I had the pleasure of evaluating your patient, [unfilled]. [Sincerely,] : Sincerely, [FreeTextEntry2] : Jw Delgadillo MD [FreeTextEntry3] : Eduar Iqbal MD, FACS, FASCRS\par , Department of Surgery\par Director of the [facility]\par , Minimally Invasive/Robotic Cancer Surgery, Central & Eastern Divisions\par Division of Surgical Oncology  [FreeTextEntry1] : 79 year-old female presents for an initial consultation.  She has a history of diverticulitis with first episode approximately 10 years ago which was complicated by abscess formation.  She underwent surgery for management of the abscess but did not require a colon resection at that time.  She suffered a second episode a few years later which was treated with inpatient IV antibiotics.   She presented to [facility] ED on 12/3/19 with left lower quadrant pain with CT demonstrated mild acute diverticulitis of the proximal sigmoid colon.  She was discharged home on oral antibiotics but was having difficulty taking the pills due to nausea.  She was concerned because she felt she was not improving, and she returned to ED on 12/6/19 and repeat CT demonstrated resolution of diverticulitis.  She remained afebrile throughout the entire course. \par \par Her past medical history includes atrial fibrillation (on aspirin 325 mg daily as she had declined Eliquis), HTN, and vertigo.  She will be establishing care with a new cardiologist in the near future.   Past surgical history includes an open appendectomy in the remote past.  She denies any family history of malignancies.  She does not smoke or drink alcohol.  \par \par She completed antibiotics 3 days ago.  She feels fatigued and her appetite is diminished but she remains afebrile and denies nausea or vomiting.  She was experiencing loose stools previously but her bowel habits are starting to return to baseline. \par \par A&P:\par stressed the importance of cardiology f/u for afib and being med non compliant\par d/w R+B+O re surgery and agrre with observation and low fiber diet\par \par PCP/Referring MD: Dr. Jw Delgadillo

## 2019-12-18 NOTE — ASSESSMENT
[FreeTextEntry1] : 79 year-old female presents for an initial consultation.  She has a history of diverticulitis with first episode approximately 10 years ago which was complicated by abscess formation.  She underwent surgery for management of the abscess but did not require a colon resection at that time.  She suffered a second episode a few years later which was treated with inpatient IV antibiotics.   She presented to [facility] ED on 12/3/19 with left lower quadrant pain with CT demonstrated mild acute diverticulitis of the proximal sigmoid colon.  She was discharged home on oral antibiotics but was having difficulty taking the pills due to nausea.  She was concerned because she felt she was not improving, and she returned to ED on 12/6/19 and repeat CT demonstrated resolution of diverticulitis.  She remained afebrile throughout the entire course. \par \par Her past medical history includes atrial fibrillation (on aspirin 325 mg daily as she had declined Eliquis), HTN, and vertigo.  She will be establishing care with a new cardiologist in the near future.   Past surgical history includes an open appendectomy in the remote past.  She denies any family history of malignancies.  She does not smoke or drink alcohol.  \par \par She completed antibiotics 3 days ago.  She feels fatigued and her appetite is diminished but she remains afebrile and denies nausea or vomiting.  She was experiencing loose stools previously but her bowel habits are starting to return to baseline. \par \par A&P:\par stressed the importance of cardiology f/u for afib and being med non compliant\par d/w R+B+O re surgery and agrre with observation and low fiber diet

## 2019-12-18 NOTE — PHYSICAL EXAM
[Normal] : supple, no neck mass and thyroid not enlarged [Normal Neck Lymph Nodes] : normal neck lymph nodes  [Normal Supraclavicular Lymph Nodes] : normal supraclavicular lymph nodes [Normal Axillary Lymph Nodes] : normal axillary lymph nodes [Normal] : oriented to person, place and time, with appropriate affect

## 2020-01-24 NOTE — ED STATDOCS - NS_EDPROVIDERDISPOUSERTYPE_ED_A_ED
Scribe Attestation (For Scribes USE Only)... Attending Attestation (For Attendings USE Only).../Scribe Attestation (For Scribes USE Only)... Manual Repair Warning Statement: We plan on removing the manually selected variable below in favor of our much easier automatic structured text blocks found in the previous tab. We decided to do this to help make the flow better and give you the full power of structured data. Manual selection is never going to be ideal in our platform and I would encourage you to avoid using manual selection from this point on, especially since I will be sunsetting this feature. It is important that you do one of two things with the customized text below. First, you can save all of the text in a word file so you can have it for future reference. Second, transfer the text to the appropriate area in the Library tab. Lastly, if there is a flap or graft type which we do not have you need to let us know right away so I can add it in before the variable is hidden. No need to panic, we plan to give you roughly 6 months to make the change.

## 2020-05-05 ENCOUNTER — APPOINTMENT (OUTPATIENT)
Dept: ELECTROPHYSIOLOGY | Facility: CLINIC | Age: 80
End: 2020-05-05

## 2020-05-11 ENCOUNTER — APPOINTMENT (OUTPATIENT)
Dept: CARDIOLOGY | Facility: CLINIC | Age: 80
End: 2020-05-11

## 2021-01-26 NOTE — ED PROVIDER NOTE - HISTORY ATTESTATION, MLM
----- Message from Jenny Edward MD sent at 1/26/2021  7:01 AM CST -----  Inform patient, her potassium is low at 2.9.  Ask patient if having any diarrhea or vomiting.  Please send potassium chloride 10 mEq 2 tablets twice a day for 3 days then 1 tablet twice a day # 80  5RF to her local pharmacy.  Repeat potassium when I see her.  (I will just send her to the lab after call I see her)  I will discuss February 1 labs normal except vitamin B12 elevated, mild decrease in kidney function albumin low, AST and bilirubin elevated   I have reviewed and confirmed nurses' notes...

## 2021-06-02 ENCOUNTER — INPATIENT (INPATIENT)
Facility: HOSPITAL | Age: 81
LOS: 4 days | Discharge: HOME | DRG: 247 | End: 2021-06-07
Attending: FAMILY MEDICINE | Admitting: INTERNAL MEDICINE
Payer: COMMERCIAL

## 2021-06-02 VITALS
HEIGHT: 66 IN | DIASTOLIC BLOOD PRESSURE: 80 MMHG | WEIGHT: 160.94 LBS | SYSTOLIC BLOOD PRESSURE: 190 MMHG | RESPIRATION RATE: 18 BRPM | HEART RATE: 64 BPM | OXYGEN SATURATION: 96 % | TEMPERATURE: 98 F

## 2021-06-02 DIAGNOSIS — I20.0 UNSTABLE ANGINA: ICD-10-CM

## 2021-06-02 LAB
ALBUMIN SERPL ELPH-MCNC: 4 G/DL — SIGNIFICANT CHANGE UP (ref 3.3–5.2)
ALP SERPL-CCNC: 96 U/L — SIGNIFICANT CHANGE UP (ref 40–120)
ALT FLD-CCNC: 14 U/L — SIGNIFICANT CHANGE UP
ANION GAP SERPL CALC-SCNC: 11 MMOL/L — SIGNIFICANT CHANGE UP (ref 5–17)
ANION GAP SERPL CALC-SCNC: 12 MMOL/L — SIGNIFICANT CHANGE UP (ref 5–17)
AST SERPL-CCNC: 19 U/L — SIGNIFICANT CHANGE UP
BASOPHILS # BLD AUTO: 0.06 K/UL — SIGNIFICANT CHANGE UP (ref 0–0.2)
BASOPHILS NFR BLD AUTO: 0.8 % — SIGNIFICANT CHANGE UP (ref 0–2)
BILIRUB SERPL-MCNC: 0.3 MG/DL — LOW (ref 0.4–2)
BLD GP AB SCN SERPL QL: SIGNIFICANT CHANGE UP
BUN SERPL-MCNC: 19.4 MG/DL — SIGNIFICANT CHANGE UP (ref 8–20)
BUN SERPL-MCNC: 23.8 MG/DL — HIGH (ref 8–20)
CALCIUM SERPL-MCNC: 8.9 MG/DL — SIGNIFICANT CHANGE UP (ref 8.6–10.2)
CALCIUM SERPL-MCNC: 9.5 MG/DL — SIGNIFICANT CHANGE UP (ref 8.6–10.2)
CHLORIDE SERPL-SCNC: 103 MMOL/L — SIGNIFICANT CHANGE UP (ref 98–107)
CHLORIDE SERPL-SCNC: 106 MMOL/L — SIGNIFICANT CHANGE UP (ref 98–107)
CO2 SERPL-SCNC: 20 MMOL/L — LOW (ref 22–29)
CO2 SERPL-SCNC: 21 MMOL/L — LOW (ref 22–29)
CREAT SERPL-MCNC: 0.7 MG/DL — SIGNIFICANT CHANGE UP (ref 0.5–1.3)
CREAT SERPL-MCNC: 0.8 MG/DL — SIGNIFICANT CHANGE UP (ref 0.5–1.3)
EOSINOPHIL # BLD AUTO: 0.18 K/UL — SIGNIFICANT CHANGE UP (ref 0–0.5)
EOSINOPHIL NFR BLD AUTO: 2.4 % — SIGNIFICANT CHANGE UP (ref 0–6)
GLUCOSE SERPL-MCNC: 101 MG/DL — HIGH (ref 70–99)
GLUCOSE SERPL-MCNC: 139 MG/DL — HIGH (ref 70–99)
HCT VFR BLD CALC: 36.3 % — SIGNIFICANT CHANGE UP (ref 34.5–45)
HGB BLD-MCNC: 12 G/DL — SIGNIFICANT CHANGE UP (ref 11.5–15.5)
IMM GRANULOCYTES NFR BLD AUTO: 0.4 % — SIGNIFICANT CHANGE UP (ref 0–1.5)
LYMPHOCYTES # BLD AUTO: 2.01 K/UL — SIGNIFICANT CHANGE UP (ref 1–3.3)
LYMPHOCYTES # BLD AUTO: 26.3 % — SIGNIFICANT CHANGE UP (ref 13–44)
MCHC RBC-ENTMCNC: 30.2 PG — SIGNIFICANT CHANGE UP (ref 27–34)
MCHC RBC-ENTMCNC: 33.1 GM/DL — SIGNIFICANT CHANGE UP (ref 32–36)
MCV RBC AUTO: 91.4 FL — SIGNIFICANT CHANGE UP (ref 80–100)
MONOCYTES # BLD AUTO: 0.6 K/UL — SIGNIFICANT CHANGE UP (ref 0–0.9)
MONOCYTES NFR BLD AUTO: 7.9 % — SIGNIFICANT CHANGE UP (ref 2–14)
NEUTROPHILS # BLD AUTO: 4.75 K/UL — SIGNIFICANT CHANGE UP (ref 1.8–7.4)
NEUTROPHILS NFR BLD AUTO: 62.2 % — SIGNIFICANT CHANGE UP (ref 43–77)
PLATELET # BLD AUTO: 333 K/UL — SIGNIFICANT CHANGE UP (ref 150–400)
POTASSIUM SERPL-MCNC: 4.6 MMOL/L — SIGNIFICANT CHANGE UP (ref 3.5–5.3)
POTASSIUM SERPL-MCNC: 5.4 MMOL/L — HIGH (ref 3.5–5.3)
POTASSIUM SERPL-SCNC: 4.6 MMOL/L — SIGNIFICANT CHANGE UP (ref 3.5–5.3)
POTASSIUM SERPL-SCNC: 5.4 MMOL/L — HIGH (ref 3.5–5.3)
PROT SERPL-MCNC: 7 G/DL — SIGNIFICANT CHANGE UP (ref 6.6–8.7)
RBC # BLD: 3.97 M/UL — SIGNIFICANT CHANGE UP (ref 3.8–5.2)
RBC # FLD: 12.9 % — SIGNIFICANT CHANGE UP (ref 10.3–14.5)
SARS-COV-2 RNA SPEC QL NAA+PROBE: SIGNIFICANT CHANGE UP
SODIUM SERPL-SCNC: 135 MMOL/L — SIGNIFICANT CHANGE UP (ref 135–145)
SODIUM SERPL-SCNC: 138 MMOL/L — SIGNIFICANT CHANGE UP (ref 135–145)
TROPONIN T SERPL-MCNC: <0.01 NG/ML — SIGNIFICANT CHANGE UP (ref 0–0.06)
TROPONIN T SERPL-MCNC: <0.01 NG/ML — SIGNIFICANT CHANGE UP (ref 0–0.06)
WBC # BLD: 7.63 K/UL — SIGNIFICANT CHANGE UP (ref 3.8–10.5)
WBC # FLD AUTO: 7.63 K/UL — SIGNIFICANT CHANGE UP (ref 3.8–10.5)

## 2021-06-02 PROCEDURE — 99220: CPT

## 2021-06-02 PROCEDURE — 99223 1ST HOSP IP/OBS HIGH 75: CPT

## 2021-06-02 PROCEDURE — 71045 X-RAY EXAM CHEST 1 VIEW: CPT | Mod: 26

## 2021-06-02 PROCEDURE — 93010 ELECTROCARDIOGRAM REPORT: CPT

## 2021-06-02 PROCEDURE — 99232 SBSQ HOSP IP/OBS MODERATE 35: CPT

## 2021-06-02 PROCEDURE — 93306 TTE W/DOPPLER COMPLETE: CPT | Mod: 26

## 2021-06-02 RX ORDER — ATORVASTATIN CALCIUM 80 MG/1
40 TABLET, FILM COATED ORAL AT BEDTIME
Refills: 0 | Status: DISCONTINUED | OUTPATIENT
Start: 2021-06-02 | End: 2021-06-07

## 2021-06-02 RX ORDER — ASPIRIN/CALCIUM CARB/MAGNESIUM 324 MG
324 TABLET ORAL ONCE
Refills: 0 | Status: DISCONTINUED | OUTPATIENT
Start: 2021-06-02 | End: 2021-06-02

## 2021-06-02 RX ORDER — LISINOPRIL 2.5 MG/1
10 TABLET ORAL DAILY
Refills: 0 | Status: DISCONTINUED | OUTPATIENT
Start: 2021-06-02 | End: 2021-06-07

## 2021-06-02 RX ORDER — AMLODIPINE BESYLATE 2.5 MG/1
5 TABLET ORAL DAILY
Refills: 0 | Status: DISCONTINUED | OUTPATIENT
Start: 2021-06-02 | End: 2021-06-07

## 2021-06-02 RX ORDER — METOPROLOL TARTRATE 50 MG
25 TABLET ORAL DAILY
Refills: 0 | Status: DISCONTINUED | OUTPATIENT
Start: 2021-06-02 | End: 2021-06-07

## 2021-06-02 RX ORDER — LISINOPRIL 2.5 MG/1
1 TABLET ORAL
Qty: 0 | Refills: 0 | DISCHARGE

## 2021-06-02 RX ORDER — ONDANSETRON 8 MG/1
4 TABLET, FILM COATED ORAL EVERY 6 HOURS
Refills: 0 | Status: DISCONTINUED | OUTPATIENT
Start: 2021-06-02 | End: 2021-06-07

## 2021-06-02 RX ADMIN — AMLODIPINE BESYLATE 5 MILLIGRAM(S): 2.5 TABLET ORAL at 15:57

## 2021-06-02 RX ADMIN — LISINOPRIL 10 MILLIGRAM(S): 2.5 TABLET ORAL at 15:57

## 2021-06-02 RX ADMIN — ATORVASTATIN CALCIUM 40 MILLIGRAM(S): 80 TABLET, FILM COATED ORAL at 21:37

## 2021-06-02 RX ADMIN — Medication 25 MILLIGRAM(S): at 15:57

## 2021-06-02 NOTE — ED CDU PROVIDER DISPOSITION NOTE - ATTENDING CONTRIBUTION TO CARE
I, Frida Carlson, performed a face to face bedside interview with this patient regarding history of present illness, review of symptoms and relevant past medical, social and family history.  I completed an independent physical examination. Medical decision making, follow-up on ordered tests (ie labs, radiologic studies) and re-evaluation of the patient's status has been communicated to the ACP.  Disposition of the patient will be based on test outcome and response to ED interventions.

## 2021-06-02 NOTE — H&P ADULT - ASSESSMENT
81 y/o female with PMHx significant for Paroxysmal Afib (on ASA, DOAC unaffordable, previously on Eliquis), HTN, diverticulitis presents to the ER with a two month history of chest pain sent in by her Cardiologist after office visit today for evaluation of chest pain. In ER patient is asymptomatic, EKG done revealing NSR, no ischemic changes noted, on telemetry sinus edward cardia with rate 58/min. Initial troponin negative, CXR without acute pathology. Cardiology consulted and patient is scheduled for [facility] today.     #Chest pain  -Admit to telemetry   -R/o ACS, s/p ASA 325mg in ER   -EKG: NSR, no ischemic changes noted  -Sinus bradycardia on telemetry, rate: 58/min   -CXR: unremarkable   -Troponin negative x 1   -Serial troponin and EKG   -Serial cardiac enzymes, EKG,   -F/u HbA1c, lipid panel  -F/U TTE   -Continue Lisinopril 10mg daily, Metoprolol ER 25mg daily, Norvasc 5mg daily added   -Cardiology consult appreciated: scheduled for [facility] today   -Maintain NPO     #Hypertension   -c/w home meds w/ holding parameters: Lisinopril and metoprolol   -Norvasc 5mg daily added   -monitor BP & titrate BP meds PRN    #PAF  -On ASA  -DOAC unaffordable   -CHADSVASc: 4 (age, sex, HTN)  -NSR on EKG  -Sinus bradycardia on telemetry, rate: 58/min       Above discussed with Dr. Headley  79 y/o female with PMHx significant for Paroxysmal Afib (on ASA, DOAC unaffordable, previously on Eliquis), HTN, diverticulitis presents to the ER with a two month history of chest pain sent in by her Cardiologist after office visit today for evaluation of chest pain. In ER patient is asymptomatic, EKG done revealing NSR, no ischemic changes noted, on telemetry sinus edward cardia with rate 58/min. Initial troponin negative, CXR without acute pathology. Cardiology consulted and patient is scheduled for [facility] today.     #Chest pain  -Admit to telemetry   -R/o ACS, s/p ASA 325mg in ER   -EKG: NSR, no ischemic changes noted  -Sinus bradycardia on telemetry, rate: 58/min   -CXR: unremarkable   -Troponin negative x 1   -Serial troponin and EKG   -Serial cardiac enzymes, EKG,   -F/u HbA1c, lipid panel  -NM stress test in 8/2018 with normal findings (done for eval of new onset AFib in 2018)  -TTE: 6/2018: Mild decrease global systolic dysfunction, LVEF 45-50%  -F/U TTE   -Continue Lisinopril 10mg daily, Metoprolol ER 25mg daily, Norvasc 5mg daily added   -Cardiology consult appreciated: scheduled for [facility] today   -Maintain NPO     #Hypertension   -c/w home meds w/ holding parameters: Lisinopril and metoprolol   -Norvasc 5mg daily added   -monitor BP & titrate BP meds PRN    #PAF  -On ASA  -DOAC unaffordable   -CHADSVASc: 4 (age, sex, HTN)  -NSR on EKG  -Sinus bradycardia on telemetry, rate: 58/min       Above discussed with Dr. Headley  81 y/o female with PMHx significant for Paroxysmal Afib (on ASA, DOAC unaffordable, previously on Eliquis), HTN, diverticulitis presents to the ER with a two month history of chest pain sent in by her Cardiologist after office visit today for evaluation of chest pain. In ER patient is asymptomatic, EKG done revealing NSR, no ischemic changes noted, on telemetry sinus edward cardia with rate 58/min. Initial troponin negative, CXR without acute pathology. Cardiology consulted and patient is scheduled for [facility] today.     #Chest pain  -Admit to telemetry   -R/o ACS, s/p ASA 325mg in ER   -EKG: NSR, no ischemic changes noted  -Sinus bradycardia on telemetry, rate: 58/min   -CXR: unremarkable   -Troponin negative x 1   -Serial troponin and EKG   -Serial cardiac enzymes, EKG,   -F/u HbA1c, lipid panel  -NM stress test in 8/2018 with normal findings (done for eval of new onset AFib in 2018)  -TTE: 6/2018: Mild decrease global systolic dysfunction, LVEF 45-50%  -F/U TTE   -Continue Lisinopril 10mg daily, Metoprolol ER 25mg daily, Norvasc 5mg daily added   -Cardiology consult appreciated: scheduled for [facility] today   -Maintain NPO     #Hypertension   -c/w home meds w/ holding parameters: Lisinopril and metoprolol   -Norvasc 5mg daily added   -monitor BP & titrate BP meds PRN    #PAF  -On ASA  -DOAC unaffordable   -CHADSVASc: 4 (age, sex, HTN)  -NSR on EKG  -Sinus bradycardia on telemetry, rate: 58/min     #Hyperkalemia  -K: 5.4  -Continue to trend electrolytes     Above discussed with Dr. Headley  81 y/o female with PMHx significant for Paroxysmal Afib (on ASA, DOAC unaffordable, previously on Eliquis), HTN, diverticulitis presents to the ER with a two month history of chest pain sent in by her Cardiologist after office visit today for evaluation of chest pain. In ER patient is asymptomatic, EKG done revealing NSR, no ischemic changes noted, on telemetry sinus edward cardia with rate 58/min. Initial troponin negative, CXR without acute pathology. [facility]iology [facility]     #Chest pain  -Admit to telemetry   -R/o ACS, s/p ASA 325mg in ER   -EKG: NSR, isolated TWI V1, q wave lead III  -Sinus bradycardia on telemetry, rate: 58/min   -CXR: unremarkable   -Troponin negative x 1   -Serial troponin and EKG   -Serial cardiac enzymes, EKG  -F/u HbA1c, lipid panel  -NM stress test in 8/2018 with normal findings (done for eval of new onset AFib in 2018)  -TTE: 6/2018: Mild decrease global systolic dysfunction, LVEF 45-50%  -F/U TTE   -Continue Lisinopril 10mg daily, Metoprolol ER 25mg daily, Norvasc 5mg daily added   -Start Lipitor 40mg po qhs   -Cardiology consult appreciated[facility]   -Maintain NPO     #Hypertension   -c/w home meds w/ holding parameters: Lisinopril and metoprolol   -Norvasc 5mg daily added   -monitor BP & titrate BP meds PRN    #PAF  -On ASA  -DOAC unaffordable   -CHADSVASc: 4 (age, sex, HTN)  -NSR on EKG  -Sinus bradycardia on telemetry, rate: 58/min     #Hyperkalemia  -K: 5.4  -Continue to trend electrolytes   -F/u Repeat BMP IN 6hrs    #VTE prophylaxis  -Will initiate post procedure     Above discussed with Dr. Headley  79 y/o female with PMHx significant for Paroxysmal Afib (on ASA, DOAC unaffordable, previously on Eliquis), HTN, diverticulitis presents to the ER with a two month history of chest pain sent in by her Cardiologist after office visit today for evaluation of chest pain. In ER patient is asymptomatic, EKG done revealing NSR, no ischemic changes noted, on telemetry sinus edward cardia with rate 58/min. Initial troponin negative, CXR without acute pathology. Cardiology consulted and patient is scheduled for [facility] today.     #Chest pain - rule out ACS  -Admit to telemetry   -s/p ASA 325mg in ER   -EKG: NSR, isolated TWI V1, q wave lead III  -Sinus bradycardia on telemetry, rate: 58/min   -CXR: unremarkable   -Troponin negative x 1   -Serial troponin and EKG   -F/u HbA1c, lipid panel  -NM stress test in 8/2018 with normal findings (done for eval of new onset AFib in 2018)  -TTE: 6/2018: Mild decrease global systolic dysfunction, LVEF 45-50%  -F/U TTE   -Continue Lisinopril 10mg daily, Metoprolol ER 25mg daily, Norvasc 5mg daily added   -Start Lipitor 40mg po qhs   -Cardiology consult appreciated: scheduled for [facility] today   -Maintain NPO     #Hypertension   -c/w home meds w/ holding parameters: Lisinopril and metoprolol   -Norvasc 5mg daily added   -monitor BP & titrate BP meds PRN    #PAF  -On ASA  -DOAC unaffordable   -CHADSVASc: 4 (age, sex, HTN)  -NSR on EKG  -Sinus bradycardia on telemetry, rate: 58/min     #Hyperkalemia  -K: 5.4  -Continue to trend electrolytes   -F/u Repeat BMP IN 6hrs    #VTE prophylaxis  -Will initiate post procedure     Above discussed with Dr. Headley

## 2021-06-02 NOTE — ED ADULT NURSE REASSESSMENT NOTE - NS ED NURSE REASSESS COMMENT FT1
Assumed care of the patient @1600. Pt A&Ox4, VSS afebrile. Pt on cardiac monitor 59 sinus bradycardia. Denies chest pain at this time. Pt NPO status maintained. Patient in understanding of plan of care. Patient with no further questions for the RN. Resting in comfort. Call bell within reach and encouraged to use when assistance needed. Will continue to monitor.

## 2021-06-02 NOTE — CONSULT NOTE ADULT - ATTENDING COMMENTS
79 y/o F with PMH pAF (refused AC per ouptatient notes, now on ASA), HTN, diverticulitis, was sent to [facility] by Dr. Monae from [facility] clinic due to worsening exertional chest pain.     #Chest Pain  EKG no ST-T changes  Trop negative x 1  Trend cardiac enzymes, EKGs  Check TTE  [facility]    #HTN  Uncontrolled  Continue lisinopril, Toprol  Start amlodipine 5mg daily     Thank you for allowing me to participate in the care of this patient  Will continue to follow

## 2021-06-02 NOTE — ED PROVIDER NOTE - NS ED ROS FT
Review of Systems  •	CONSTITUTIONAL - no  fever, no diaphoresis, no weight change  •	SKIN - no rash  •	HEMATOLOGIC - no bleeding, no bruising  •	EYES - no eye pain, no blurred vision  •	ENT - no change in hearing, no pain  •	RESPIRATORY - no shortness of breath, no cough  •	CARDIAC - + chest pain, no palpitations  •	GI - no abd pain, no nausea, no vomiting, no diarrhea, no constipation, no bleeding  •	GENITO-URINARY - no discharge, no dysuria; no hematuria,   •	ENDO - no polydipsia, no polyuria, no heat/no cold intolerance  •	MUSCULOSKELETAL - no joint pain, no swelling, no redness  •	NEUROLOGIC - no weakness, no headache, no anesthesia, no paresthesias  •	PSYCH - no anxiety, non suicidal, non homicidal, no hallucination, no depression

## 2021-06-02 NOTE — H&P ADULT - NSICDXPASTMEDICALHX_GEN_ALL_CORE_FT
PAST MEDICAL HISTORY:  Abscess abdominal abcess    Diverticulitis     Essential hypertension     Paroxysmal atrial fibrillation

## 2021-06-02 NOTE — ED PROVIDER NOTE - CLINICAL SUMMARY MEDICAL DECISION MAKING FREE TEXT BOX
Sent by Dr. Monae for exertional CP, exertional dyspnea: ekg, blood work, x-ray, cardiology consult.

## 2021-06-02 NOTE — ED CDU PROVIDER DISPOSITION NOTE - CLINICAL COURSE
=: 79 y/o female with medical history of proximal afib (was on Eliquis but currently, no AC d/t cost), HTN, diverticulitis, who presents to [facility]ED for chest pain. Pt states that she has been experiencing a chest burning sensation since Apri and gets worse to the pain as she is walking for 5min or so and gets better by sitting and resting. . just got notified by cardiology team that pt need to be admitted for cardiac authorization

## 2021-06-02 NOTE — ED ADULT NURSE NOTE - OBJECTIVE STATEMENT
80y female AOx4 c/o burning epigastric pain intermittent since April. Pt contacted PMD, 80y female AOx4 c/o burning epigastric pain intermittent since April. pt reports chest discomfort worsens with movement. Pt contacted PMD, MD recommended coming to ED. pt took full dose ASA this AM. respirations even and unlabored. abd soft and nondistended. denies nausea/vomiting, diaphoresis, dizziness. skin WNL for race.

## 2021-06-02 NOTE — ED PROVIDER NOTE - OBJECTIVE STATEMENT
81 y/o female with PMHx of HTN, HLD, proximal a-fib, c/o exertional chest pain. Pt has been getting exertional chest pain since April, when she told her PMD this they recommended she come to the ER. PT states pain is a burning sensation. Pt denies blood thinner usage. Pt took full Asprin this morning.

## 2021-06-02 NOTE — H&P ADULT - NSHPPHYSICALEXAM_GEN_ALL_CORE
Vital Signs Last 24 Hrs  T(C): 36.2 (02 Jun 2021 15:53), Max: 36.8 (02 Jun 2021 12:53)  T(F): 97.1 (02 Jun 2021 15:53), Max: 98.2 (02 Jun 2021 12:53)  HR: 59 (02 Jun 2021 15:53) (59 - 64)  BP: 176/79 (02 Jun 2021 15:53) (176/79 - 190/80)  RR: 18 (02 Jun 2021 15:53) (18 - 18)  SpO2: 99% (02 Jun 2021 15:53) (96% - 99%)    PHYSICAL EXAM:  GENERAL: NAD, lying in bed comfortably  HEAD:  Atraumatic, Normocephalic  EYES: conjunctiva and sclera clear  ENT: Moist mucous membranes  NECK: Supple, No JVD  CHEST/LUNG: Clear to auscultation bilaterally; No rales, rhonchi, wheezing, or rubs. Unlabored respirations  HEART: Regular rate and rhythm; No murmurs, rubs, or gallops  ABDOMEN: Bowel sounds present; Soft, Nontender, Nondistended.   EXTREMITIES:  2+ Peripheral Pulses, brisk capillary refill. No clubbing, cyanosis, or edema  NERVOUS SYSTEM:  Alert & Oriented X3, speech clear. No deficits   MSK: FROM all 4 extremities, full and equal strength

## 2021-06-02 NOTE — ED CDU PROVIDER INITIAL DAY NOTE - MEDICAL DECISION MAKING DETAILS
81 y/o female with medical history of proximal afib (was on Eliquis but currently, no AC d/t cost), HTN, diverticulitis With 1 month exertional c.p   seen by [facility]SC   cont telemetry   med as per SSC   serial trop and EKGx 2   pending Echo and stress test  mild elevated the K ?due to lisinopril will repeat K in AM

## 2021-06-02 NOTE — H&P ADULT - NSHPSOCIALHISTORY_GEN_ALL_CORE
Patient is independent, fully functional, lives alone, has 2 sons, one here and another in [place]. Denies tobacco use, rare occasional EtOH use

## 2021-06-02 NOTE — ED CDU PROVIDER INITIAL DAY NOTE - OBJECTIVE STATEMENT
79 y/o female with medical history of proximal afib (was on Eliquis but currently, no AC d/t cost), HTN, diverticulitis, who presents to [facility]-ED for chest pain. Pt states that she has been experiencing a chest burning sensation since Apri and gets worse to the pain as she is walking for 5min or so and gets better by sitting and resting. Pt saw PCP who recommended she start Pepcid which did not relieve symptoms. denies any associated symptoms include , Sob , sweating .dizziness , lightheadedness. states never had before

## 2021-06-02 NOTE — CONSULT NOTE ADULT - ASSESSMENT
Pt is a 81 y/o female with medical history of pAF (was on Eliquis but currently, no AC d/t cost), HTN, diverticulitis, who presents to [facility]-ED for chest pain. Pt states that she has been experiencing a chest burning sensation since April. Chest discomfort described as burning that changes to pain with prolonged exertion, non-radiating, lasting 5 minutes or until she rests. Pt states rest relieves symptoms. Pt saw PCP who recommended she start Pepcid which did not relieve symptoms. Pt saw cardiologist Dr. Monae in [facility] office today and she was advised to seek emergent medical care to evaluate symptoms. In ED, ECG- NSR HR @ 62, XRAY-No focal consolidation or pleural effusion, Trop#1-negative. Pt denies feeling chest discomfort at time of physical assessment.     Chest Pain  -ECG- NSR HR @ 62, XRAY-No focal consolidation or pleural effusion  -Trop#1-negative  -XRAY-No focal consolidation or pleural effusion  -Cont. to trend trop  -Echo-ordered and pending  -Plan for NST in AM, please maintain NPO after MN      HTN  -Recent BP-190/80  -Start Amlodipine 5mg  -Cont. current Toprol XL 25mg, and Lisinopril 10mg Pt is a 79 y/o female with medical history of pAF (was on Eliquis but currently, no AC d/t cost), HTN, diverticulitis, who presents to [facility]-ED for chest pain. Pt states that she has been experiencing a chest burning sensation since April. Chest discomfort described as burning that changes to pain with prolonged exertion, non-radiating, lasting 5 minutes or until she rests. Pt states rest relieves symptoms. Pt saw PCP who recommended she start Pepcid which did not relieve symptoms. Pt saw cardiologist Dr. Monae in [facility] office today and she was advised to seek emergent medical care to evaluate symptoms. In ED, ECG- NSR HR @ 62, XRAY-No focal consolidation or pleural effusion, Trop#1-negative. Pt denies feeling chest discomfort at time of physical assessment.     BRA-2.7%  ASA/Malampati-2/    Chest Pain  -ECG- NSR HR @ 62, XRAY-No focal consolidation or pleural effusion  -Trop#1-negative  -XRAY-No focal consolidation or pleural effusion  -Cont. to trend trop  -Echo-ordered and pending  -Plan for cardiac catheterization today 6/2      HTN  -Recent BP-190/80  -Start Amlodipine 5mg  -Cont. current Toprol XL 25mg, and Lisinopril 10mg

## 2021-06-02 NOTE — ED CDU PROVIDER INITIAL DAY NOTE - ATTENDING CONTRIBUTION TO CARE
I, Frida Carlson, performed a face to face bedside interview with this patient regarding history of present illness, review of symptoms and relevant past medical, social and family history.  I completed an independent physical examination. Medical decision making, follow-up on ordered tests (ie labs, radiologic studies) and re-evaluation of the patient's status has been communicated to the [facility].  Disposition of the patient will be based on test outcome and response to ED interventions.     patient with unstable angina, pending further w/u with stress and echo

## 2021-06-02 NOTE — H&P ADULT - HISTORY OF PRESENT ILLNESS
79 y/o female with PMHx significant for Paroxysmal Afib (on ASA, DOAC unaffordable, previously on Eliquis), HTN, diverticulitis presents to the ER with a two month history of chest pain sent in by her Cardiologist after office visit today. Patient states that she started to experience a burning sensation to mid chest area in April, which initially presents as a burning sensation on exertion then becomes more of a painful sensation with prolonged exertion, with intensity reaching ~ 6/10, pain resolved with resting for ~ 5 minutes, non radiating, without accompanying symptoms. Patient denies SOB, palpitations, dizziness, PND, N/V, leg edema. Patient states that she initially attributed her symptoms to reflux, however symptoms were not alleviated with treatment for GERD. In ER patient is asymptomatic, EKG done revealing NSR, no ischemic changes noted, on telemetry sinus edward cardia with rate 58/min. Initial troponin negative, CXR without acute pathology.

## 2021-06-02 NOTE — ED ADULT TRIAGE NOTE - CHIEF COMPLAINT QUOTE
pt sent in by Dr Monae for heart eval. as per papers pt with PAF, since April has been experiencing intermittent CP with exertion, starts as heart burn. denies CP at this time.

## 2021-06-02 NOTE — ED CDU PROVIDER INITIAL DAY NOTE - PHYSICAL EXAMINATION
Const: AOX3 nontoxic appearing, no apparent respiratory or physical distress. on cardiac mon   HEENT: NC/AT. Moist mucous membranes.  Eyes: PAYTON. EOMI  Neck: Soft and supple. Full ROM without pain.  Cardiac: Regular rate and regular rhythm. +S1/S2. No murmurs. Peripheral pulses 2+ and symmetric. No LE edema.  Resp: Speaking in full sentences. No evidence of respiratory distress. No wheezes, rales or rhonchi. No adventitious breath sounds   Abd: Soft, non-tender, non-distended. Normal bowel sounds in all 4 quadrants. No guarding or rebound.  Back: Spine midline and non-tender. No CVAT.  Skin: No rashes, abrasions or lacerations.  Lymph: No cervical lymphadenopathy.  Neuro: Awake, alert & oriented x 3. Moves all extremities symmetrically.

## 2021-06-02 NOTE — H&P ADULT - ATTENDING COMMENTS
Attending Attestation:  I personally saw and evaluated the patient and agree with the above assessment and plan  Changes made above where appropriate    EXAM:  General: NAD  Lungs: CTAB  Heart: RR  Abdomen: nt, nd, benign  Ext: no edema  Neuro: AOx4  Skin: intact    Brief A/P:  79 yo with hx of a-fib not on full AC, hypertension presenting with chest pain on exertion, improves with rest. For cath today. Will follow results.

## 2021-06-02 NOTE — CONSULT NOTE ADULT - SUBJECTIVE AND OBJECTIVE BOX
[facility]                                                               Office:  [address] [place] [address]                                                              Telephone: [phone]. Fax:[phone]                                                                        CARDIOLOGY CONSULTATION NOTE                                                                                             Consult requested by:  Dr. Guidry  Reason for Consultation: Chest Pain  PMD: Unknown  Primary Cardiology: Dr. Monae  History obtained by: Patient and medical record   obtained: No    Chief complaint:    Patient is a 80y old  Female who presents with a chief complaint of chest pain      HPI: Pt is a 81 y/o female with medical history of pAF (was on Eliquis but currently, no AC d/t cost), HTN, diverticulitis, who presents to [facility]-ED for chest pain. Pt states that she has been experiencing a chest burning sensation since April. Chest discomfort described as burning that changes to pain with prolonged exertion, non-radiating, lasting 5 minutes or until she rests. Pt states rest relieves symptoms. Pt saw PCP who recommended she start Pepcid which did not relieve symptoms. Pt saw cardiologist Dr. Monae in [facility] office today and she was advised to seek emergent medical care to evaluate symptoms. In ED, ECG- NSR HR @ 62, XRAY-No focal consolidation or pleural effusion, Trop#1-negative. Pt denies feeling chest discomfort at time of physical assessment.           REVIEW OF SYMPTOMS:     CONSTITUTIONAL: No fever, weight loss, or fatigue  ENMT:  No difficulty hearing, tinnitus, vertigo; No sinus or throat pain  NECK: No pain or stiffness  CARDIOVASCULAR: See HPI  RESPIRATORY: No Dyspnea on exertion, Shortness of breath, cough, wheezing  : No dysuria, no hematuria   GI: No dark color stool, no melena, no diarrhea, no constipation, no abdominal pain   NEURO: No headache, no dizziness, no slurred speech   MUSCULOSKELETAL: No joint pain or swelling; No muscle, back, or extremity pain  PSYCH: No agitation, no anxiety.    ALL OTHER REVIEW OF SYSTEMS ARE NEGATIVE.      PREVIOUS DIAGNOSTIC TESTING  ECHO FINDINGS: pending      STRESS FINDINGS: pending      ALLERGIES: Allergies    penicillin (Unknown)    Intolerances      PAST MEDICAL HISTORY  Diverticulitis    Abscess    Essential hypertension        PAST SURGICAL HISTORY  No significant past surgical history        FAMILY HISTORY:  No pertinent family history in first degree relatives        SOCIAL HISTORY:  Denies smoking/alcohol/drugs    CURRENT MEDICATIONS:           HOME MEDICATIONS:  ASA 325mg  Lisinopril 10mg  Toprol XL 25mg  Pepcid 40mg  Phenazapyridine      Vital Signs Last 24 Hrs  T(C): 36.8 (02 Jun 2021 12:53), Max: 36.8 (02 Jun 2021 12:53)  T(F): 98.2 (02 Jun 2021 12:53), Max: 98.2 (02 Jun 2021 12:53)  HR: 64 (02 Jun 2021 12:53) (64 - 64)  BP: 190/80 (02 Jun 2021 12:53) (190/80 - 190/80)  RR: 18 (02 Jun 2021 12:53) (18 - 18)  SpO2: 96% (02 Jun 2021 12:53) (96% - 96%)      PHYSICAL EXAM:  Constitutional: Comfortable . No acute distress.   HEENT: Atraumatic and normocephalic , neck is supple . no JVD. No carotid bruit. PEERL   CNS: A&Ox3. No focal deficits. EOMI.    Lymph Nodes: Cervical : Not palpable.  Respiratory: CTAB   Cardiovascular: S1S2 RRR. No murmur/rubs or gallop.  Gastrointestinal: Soft non-tender and non distended . +Bowel sounds. negative Bonilla's sign.  Extremities: No edema.   Psychiatric: Calm . no agitation.  Skin: No skin rash/ulcers visualized to face, hands or feet.    Intake and output:     LABS:                        12.0   7.63  )-----------( 333      ( 02 Jun 2021 14:15 )             36.3           CARDIAC MARKERS ( 02 Jun 2021 14:15 )  x     / <0.01 ng/mL / x     / x     / x        ;p-BNP=        INTERPRETATION OF TELEMETRY: NSR HR @ 60s  ECG: NSR HR @ 62    RADIOLOGY & ADDITIONAL STUDIES:      X-ray:  < from: Xray Chest 1 View-PORTABLE IMMEDIATE (Xray Chest 1 View-PORTABLE IMMEDIATE .) (06.24.18 @ 02:16) >  IMPRESSION:     No focal consolidation or pleural effusion.

## 2021-06-03 ENCOUNTER — TRANSCRIPTION ENCOUNTER (OUTPATIENT)
Age: 81
End: 2021-06-03

## 2021-06-03 LAB
A1C WITH ESTIMATED AVERAGE GLUCOSE RESULT: 5.2 % — SIGNIFICANT CHANGE UP (ref 4–5.6)
ALBUMIN SERPL ELPH-MCNC: 3.7 G/DL — SIGNIFICANT CHANGE UP (ref 3.3–5.2)
ALP SERPL-CCNC: 75 U/L — SIGNIFICANT CHANGE UP (ref 40–120)
ALT FLD-CCNC: 12 U/L — SIGNIFICANT CHANGE UP
ANION GAP SERPL CALC-SCNC: 10 MMOL/L — SIGNIFICANT CHANGE UP (ref 5–17)
AST SERPL-CCNC: 15 U/L — SIGNIFICANT CHANGE UP
BILIRUB SERPL-MCNC: 0.3 MG/DL — LOW (ref 0.4–2)
BUN SERPL-MCNC: 19.6 MG/DL — SIGNIFICANT CHANGE UP (ref 8–20)
CALCIUM SERPL-MCNC: 8.9 MG/DL — SIGNIFICANT CHANGE UP (ref 8.6–10.2)
CHLORIDE SERPL-SCNC: 108 MMOL/L — HIGH (ref 98–107)
CHOLEST SERPL-MCNC: 167 MG/DL — SIGNIFICANT CHANGE UP
CO2 SERPL-SCNC: 22 MMOL/L — SIGNIFICANT CHANGE UP (ref 22–29)
COVID-19 SPIKE DOMAIN AB INTERP: POSITIVE
COVID-19 SPIKE DOMAIN ANTIBODY RESULT: >250 U/ML — HIGH
CREAT SERPL-MCNC: 0.72 MG/DL — SIGNIFICANT CHANGE UP (ref 0.5–1.3)
ESTIMATED AVERAGE GLUCOSE: 103 MG/DL — SIGNIFICANT CHANGE UP (ref 68–114)
GLUCOSE SERPL-MCNC: 94 MG/DL — SIGNIFICANT CHANGE UP (ref 70–99)
HDLC SERPL-MCNC: 54 MG/DL — SIGNIFICANT CHANGE UP
LIPID PNL WITH DIRECT LDL SERPL: 101 MG/DL — HIGH
MAGNESIUM SERPL-MCNC: 2 MG/DL — SIGNIFICANT CHANGE UP (ref 1.6–2.6)
NON HDL CHOLESTEROL: 113 MG/DL — SIGNIFICANT CHANGE UP
PHOSPHATE SERPL-MCNC: 4.3 MG/DL — SIGNIFICANT CHANGE UP (ref 2.4–4.7)
POTASSIUM SERPL-MCNC: 4.4 MMOL/L — SIGNIFICANT CHANGE UP (ref 3.5–5.3)
POTASSIUM SERPL-SCNC: 4.4 MMOL/L — SIGNIFICANT CHANGE UP (ref 3.5–5.3)
PROT SERPL-MCNC: 6.3 G/DL — LOW (ref 6.6–8.7)
SARS-COV-2 IGG+IGM SERPL QL IA: >250 U/ML — HIGH
SARS-COV-2 IGG+IGM SERPL QL IA: POSITIVE
SODIUM SERPL-SCNC: 140 MMOL/L — SIGNIFICANT CHANGE UP (ref 135–145)
TRIGL SERPL-MCNC: 60 MG/DL — SIGNIFICANT CHANGE UP

## 2021-06-03 PROCEDURE — 93458 L HRT ARTERY/VENTRICLE ANGIO: CPT | Mod: 26

## 2021-06-03 PROCEDURE — 99233 SBSQ HOSP IP/OBS HIGH 50: CPT

## 2021-06-03 PROCEDURE — 99152 MOD SED SAME PHYS/QHP 5/>YRS: CPT

## 2021-06-03 PROCEDURE — 99232 SBSQ HOSP IP/OBS MODERATE 35: CPT

## 2021-06-03 PROCEDURE — 93010 ELECTROCARDIOGRAM REPORT: CPT

## 2021-06-03 RX ORDER — CLOPIDOGREL BISULFATE 75 MG/1
600 TABLET, FILM COATED ORAL ONCE
Refills: 0 | Status: COMPLETED | OUTPATIENT
Start: 2021-06-03 | End: 2021-06-03

## 2021-06-03 RX ORDER — CLOPIDOGREL BISULFATE 75 MG/1
75 TABLET, FILM COATED ORAL DAILY
Refills: 0 | Status: DISCONTINUED | OUTPATIENT
Start: 2021-06-04 | End: 2021-06-07

## 2021-06-03 RX ORDER — ASPIRIN/CALCIUM CARB/MAGNESIUM 324 MG
81 TABLET ORAL ONCE
Refills: 0 | Status: COMPLETED | OUTPATIENT
Start: 2021-06-03 | End: 2021-06-03

## 2021-06-03 RX ORDER — ACETAMINOPHEN 500 MG
650 TABLET ORAL EVERY 6 HOURS
Refills: 0 | Status: DISCONTINUED | OUTPATIENT
Start: 2021-06-03 | End: 2021-06-07

## 2021-06-03 RX ORDER — ASPIRIN/CALCIUM CARB/MAGNESIUM 324 MG
81 TABLET ORAL DAILY
Refills: 0 | Status: DISCONTINUED | OUTPATIENT
Start: 2021-06-04 | End: 2021-06-07

## 2021-06-03 RX ADMIN — CLOPIDOGREL BISULFATE 600 MILLIGRAM(S): 75 TABLET, FILM COATED ORAL at 17:54

## 2021-06-03 RX ADMIN — Medication 81 MILLIGRAM(S): at 14:18

## 2021-06-03 RX ADMIN — ATORVASTATIN CALCIUM 40 MILLIGRAM(S): 80 TABLET, FILM COATED ORAL at 21:29

## 2021-06-03 RX ADMIN — AMLODIPINE BESYLATE 5 MILLIGRAM(S): 2.5 TABLET ORAL at 05:28

## 2021-06-03 RX ADMIN — Medication 25 MILLIGRAM(S): at 05:28

## 2021-06-03 RX ADMIN — LISINOPRIL 10 MILLIGRAM(S): 2.5 TABLET ORAL at 05:28

## 2021-06-03 NOTE — PROGRESS NOTE ADULT - SUBJECTIVE AND OBJECTIVE BOX
HPI: 81 y/o female with PMHx significant for Paroxysmal Afib (on ASA, DOAC unaffordable, previously on Eliquis), HTN, diverticulitis presents to the ER with a two month history of chest pain sent in by her Cardiologist after office visit today. Patient states that she started to experience a burning sensation to mid chest area in April, which initially presents as a burning sensation on exertion then becomes more of a painful sensation with prolonged exertion, with intensity reaching ~ 6/10, pain resolved with resting for ~ 5 minutes, non radiating, without accompanying symptoms. Patient denies SOB, palpitations, dizziness, PND, N/V, leg edema. Patient states that she initially attributed her symptoms to reflux, however symptoms were not alleviated with treatment for GERD. In ER patient is asymptomatic, EKG done revealing NSR, no ischemic changes noted, on telemetry sinus edward cardia with rate 58/min. Initial troponin negative, CXR without acute pathology.  (02 Jun 2021 16:36)    Subjective: Patient seen and examined at bedside, in no apparent distress, has no complaints to offer, pending [facility] probably today. No overnight issues reported.     REVIEW OF SYSTEMS:    CONSTITUTIONAL: No weakness, fevers or chills  EYES/ENT: No visual changes;  No vertigo or throat pain   NECK: No pain or stiffness  RESPIRATORY: No cough, wheezing, hemoptysis; No shortness of breath  CARDIOVASCULAR: No chest pain or palpitations  GASTROINTESTINAL: No abdominal or epigastric pain. No nausea, vomiting, or hematemesis; No diarrhea or constipation. No melena or hematochezia.  GENITOURINARY: No dysuria, frequency or hematuria  NEUROLOGICAL: No numbness or weakness  SKIN: No itching, rashes    Vital Signs Last 24 Hrs  T(C): 36.4 (03 Jun 2021 08:22), Max: 36.8 (02 Jun 2021 12:53)  T(F): 97.6 (03 Jun 2021 08:22), Max: 98.3 (02 Jun 2021 21:35)  HR: 55 (03 Jun 2021 08:22) (55 - 64)  BP: 126/68 (03 Jun 2021 08:22) (126/68 - 190/80)  RR: 18 (03 Jun 2021 08:22) (16 - 18)  SpO2: 97% (03 Jun 2021 08:22) (96% - 99%)    PHYSICAL EXAM:  GENERAL: NAD, lying in bed comfortably  HEAD:  Atraumatic, Normocephalic  EYES: conjunctiva and sclera clear  ENT: Moist mucous membranes  NECK: Supple, No JVD  CHEST/LUNG: Clear to auscultation bilaterally; No rales, rhonchi, wheezing, or rubs. Unlabored respirations  HEART: Regular rhythm; No murmurs, rubs, or gallops  ABDOMEN: Bowel sounds present; Soft, Nontender, Nondistended.  EXTREMITIES:  2+ Peripheral Pulses, brisk capillary refill. No clubbing, cyanosis, or edema  NERVOUS SYSTEM:  Alert & Oriented X3, speech clear. No deficits   MSK: FROM all 4 extremities, full and equal strength      MEDICATIONS  (STANDING):  amLODIPine   Tablet 5 milliGRAM(s) Oral daily  atorvastatin 40 milliGRAM(s) Oral at bedtime  lisinopril 10 milliGRAM(s) Oral daily  metoprolol succinate ER 25 milliGRAM(s) Oral daily    MEDICATIONS  (PRN):  ondansetron Injectable 4 milliGRAM(s) IV Push every 6 hours PRN Nausea        LABS:                          12.0   7.63  )-----------( 333      ( 02 Jun 2021 14:15 )             36.3     03 Jun 2021 07:19    140    |  108    |  19.6   ----------------------------<  94     4.4     |  22.0   |  0.72     Ca    8.9        03 Jun 2021 07:19  Phos  4.3       03 Jun 2021 07:19  Mg     2.0       03 Jun 2021 07:19    TPro  6.3    /  Alb  3.7    /  TBili  0.3    /  DBili  x      /  AST  15     /  ALT  12     /  AlkPhos  75     03 Jun 2021 07:19    LIVER FUNCTIONS - ( 03 Jun 2021 07:19 )  Alb: 3.7 g/dL / Pro: 6.3 g/dL / ALK PHOS: 75 U/L / ALT: 12 U/L / AST: 15 U/L / GGT: x               CARDIAC MARKERS ( 02 Jun 2021 23:04 )  x     / <0.01 ng/mL / x     / x     / x      CARDIAC MARKERS ( 02 Jun 2021 14:15 )  x     / <0.01 ng/mL / x     / x     / x              RADIOLOGY:

## 2021-06-03 NOTE — DISCHARGE NOTE PROVIDER - NSDCFUADDAPPT_GEN_ALL_CORE_FT
Follow up with Dr. Monae in one to two weeks    Restricted use with no heavy lifting of affected arm for 48 hours.  No submerging the arm in water for 48 hours.  You may start showering today.  Call your doctor for any bleeding, swelling, loss of sensation in the hand or fingers, or fingers turning blue.  If heavy bleeding or large lumps form, hold pressure at the spot and come to the Emergency Room.   Follow up with Dr. Monae in one to two weeks. Please call the office to make an appointment.    Follow up with Dr. Monae in one to two weeks. Please call the office to make an appointment.   Follow up with PMD in 1 week.

## 2021-06-03 NOTE — DISCHARGE NOTE PROVIDER - NSDCCPCAREPLAN_GEN_ALL_CORE_FT
PRINCIPAL DISCHARGE DIAGNOSIS  Diagnosis: Unstable angina  Assessment and Plan of Treatment: Patient states that she started to experience a burning sensation to mid chest area in April, which initially presents as a burning sensation on exertion then becomes more of a painful sensation with prolonged exertion, with intensity reaching ~ 6/10, pain resolved with resting for ~ 5 minutes, non radiating, without accompanying symptoms. Patient denies SOB, palpitations, dizziness, PND, N/V, leg edema. Patient states that she initially attributed her symptoms to reflux, however symptoms were not alleviated with treatment for GERD. In ER patient is asymptomatic, EKG done revealing NSR, no ischemic changes noted, on telemetry sinus edward cardia with rate 58/min. Initial troponin negative, CXR without acute pathology. Underwent left heart catheterization 6/3 and 6/4 to evaluate for coronary artey disease and possible stent placement.          PRINCIPAL DISCHARGE DIAGNOSIS  Diagnosis: Unstable angina  Assessment and Plan of Treatment: Now s/p heart cath with stent placement. Continue Aspirin, Plavix, Lipitor, Metoprolol, and Lisinopril as prescribed. Follow up with Cardiologist outpatient.      SECONDARY DISCHARGE DIAGNOSES  Diagnosis: Retroperitoneal hemorrhage  Assessment and Plan of Treatment: Currently stable. Started on Iron - to continue as prescribed. Follow up with PMD for repeat CBC monitoring.    Diagnosis: HTN (hypertension)  Assessment and Plan of Treatment: Continue Metoprolol, Lisinopril, and Amlodipine. Monitor BP. Follow up with PMD.

## 2021-06-03 NOTE — PROGRESS NOTE ADULT - ASSESSMENT
81 y/o female with PMHx significant for Paroxysmal Afib (on ASA, DOAC unaffordable, previously on Eliquis), HTN, diverticulitis presents to the ER with a two month history of chest pain sent in by her Cardiologist after office visit today for evaluation of chest pain. In ER patient is asymptomatic, EKG done revealing NSR, no ischemic changes noted, on telemetry sinus edward cardia with rate 58/min. Initial troponin negative, CXR without acute pathology. Cardiology consulted and patient is scheduled for [facility] today.     #Chest pain - rule out ACS  -Admit to telemetry   -s/p ASA 325mg in ER   -EKG: NSR, isolated TWI V1, q wave lead III  -Sinus bradycardia on telemetry  -CXR: unremarkable   -Troponin negative x 2  -Serial troponin and EKG   -F/u HbA1c, lipid panel  -NM stress test in 8/2018 with normal findings (done for eval of new onset AFib in 2018)  -TTE: 6/2018: Mild decrease global systolic dysfunction, LVEF 45-50%  -F/U TTE   -Continue Lisinopril 10mg daily, Metoprolol ER 25mg daily, Norvasc 5mg daily added   -Continue Lipitor 40mg po qhs   -Cardiology consult appreciated: scheduled for [facility]    -Maintain NPO     #Hypertension   -c/w home meds w/ holding parameters: Lisinopril and metoprolol   -Norvasc 5mg daily added   -monitor BP & titrate BP meds PRN    #PAF  -On ASA  -DOAC unaffordable   -CHADSVASc: 4 (age, sex, HTN)  -NSR on EKG  -Sinus bradycardia on telemetry, rate: 58/min     #Hyperkalemia  -K: 5.4> 4.4   -Continue to trend electrolytes     #VTE prophylaxis  -Will initiate post procedure   -SCDs for now     Above discussed with Dr. Headley  81 y/o female with PMHx significant for Paroxysmal Afib (on ASA, DOAC unaffordable, previously on Eliquis), HTN, diverticulitis presents to the ER with a two month history of chest pain sent in by her Cardiologist after office visit today for evaluation of chest pain. In ER patient is asymptomatic, EKG done revealing NSR, no ischemic changes noted, on telemetry sinus edward cardia with rate 58/min. Initial troponin negative, CXR without acute pathology. Cardiology consulted and patient is scheduled for [facility] today.     #Chest pain - rule out ACS  -Admit to telemetry   -s/p ASA 325mg in ER   -EKG: NSR, isolated TWI V1, q wave lead III  -Sinus bradycardia on telemetry  -CXR: unremarkable   -Troponin negative x 2  -Serial troponin and EKG   -HbA1c: 5.2%  -Lipid panel reviewed: , T cholesterol: 167, HDL: 54  -NM stress test in 8/2018 with normal findings (done for eval of new onset AFib in 2018)  -TTE: 6/2018: Mild decrease global systolic dysfunction, LVEF 45-50%  -F/U TTE   -Continue Lisinopril 10mg daily, Metoprolol ER 25mg daily, Norvasc 5mg daily added   -Continue Lipitor 40mg po qhs   -Cardiology consult appreciated: scheduled for [facility]    -Maintain NPO     #Hypertension   -c/w home meds w/ holding parameters: Lisinopril and metoprolol   -Norvasc 5mg daily added   -monitor BP & titrate BP meds PRN    #PAF  -On ASA  -DOAC unaffordable   -CHADSVASc: 4 (age, sex, HTN)  -NSR on EKG  -Sinus bradycardia on telemetry, rate: 58/min     #Hyperkalemia  -K: 5.4> 4.4   -Continue to trend electrolytes     #VTE prophylaxis  -Will initiate post procedure   -SCDs for now     Above discussed with Dr. Headley  79 y/o female with PMHx significant for Paroxysmal Afib (on ASA, DOAC unaffordable, previously on Eliquis), HTN, diverticulitis presents to the ER with a two month history of chest pain sent in by her Cardiologist after office visit today for evaluation of chest pain. In ER patient is asymptomatic, EKG done revealing NSR, no ischemic changes noted, on telemetry sinus edawrd cardia with rate 58/min. Initial troponin negative, CXR without acute pathology. Cardiology consulted and patient is scheduled for [facility] today.     #Chest pain - rule out ACS  -s/p ASA 325mg in ER   -EKG: NSR, isolated TWI V1, q wave lead III  -Lipid panel reviewed: , T cholesterol: 167, HDL: 54  -NM stress test in 8/2018 with normal findings (done for eval of new onset AFib in 2018)  - TTE shows preserved EF of 60%  -Continue Lisinopril 10mg daily, Metoprolol ER 25mg daily, Norvasc 5mg daily added   -Continue Lipitor 40mg po qhs   -Cardiology consult appreciated: scheduled for [facility] this afternoon  -Maintain NPO     #Hypertension   -c/w home meds w/ holding parameters: Lisinopril and metoprolol   -Norvasc 5mg daily added   -monitor BP & titrate BP meds PRN    #PAF  -On ASA  -DOAC unaffordable   -CHADSVASc: 4 (age, sex, HTN)  -NSR on EKG  -Sinus bradycardia on telemetry, rate: 58/min     #Hyperkalemia  -K: 5.4> 4.4   -Continue to trend electrolytes     #VTE prophylaxis  -Will initiate post procedure   -SCDs for now     Above discussed with Dr. Headley

## 2021-06-03 NOTE — PROGRESS NOTE ADULT - ASSESSMENT
A/P: Pt is a 79 y/o female with medical history of pAF (was on Eliquis but currently, no AC d/t cost), HTN, diverticulitis, who presents to [facility]-ED for chest pain. Pt states that she has been experiencing a chest burning sensation since April. Chest discomfort described as burning that changes to pain with prolonged exertion, non-radiating, lasting 5 minutes or until she rests. Pt states rest relieves symptoms. Pt saw PCP who recommended she start Pepcid which did not relieve symptoms. Pt saw cardiologist Dr. Monae in [facility] office today and she was advised to seek emergent medical care to evaluate symptoms. In ED, ECG- NSR HR @ 62, XRAY-No focal consolidation or pleural effusion, Trop#1-negative. Pt denies feeling chest discomfort at time of physical assessment. For LHC with Dr. Kruger for further evaluation.    -NPO for procedure  -Consent to be obtained by MD

## 2021-06-03 NOTE — DISCHARGE NOTE PROVIDER - NSDCMRMEDTOKEN_GEN_ALL_CORE_FT
aspirin 81 mg oral delayed release tablet: 1 tab(s) orally once a day  famotidine 40 mg oral tablet: 1 tab(s) orally once a day   lisinopril 10 mg oral tablet: 1 tab(s) orally once a day  metoprolol tartrate 25 mg oral tablet: 1 tab(s) orally 2 times a day   amLODIPine 5 mg oral tablet: 1 tab(s) orally once a day  aspirin 81 mg oral delayed release tablet: 1 tab(s) orally once a day  atorvastatin 40 mg oral tablet: 1 tab(s) orally once a day (at bedtime)  clopidogrel 75 mg oral tablet: 1 tab(s) orally once a day  famotidine 40 mg oral tablet: 1 tab(s) orally once a day   lisinopril 10 mg oral tablet: 1 tab(s) orally once a day  metoprolol tartrate 25 mg oral tablet: 1 tab(s) orally 2 times a day   amLODIPine 5 mg oral tablet: 1 tab(s) orally once a day  aspirin 81 mg oral delayed release tablet: 1 tab(s) orally once a day  atorvastatin 40 mg oral tablet: 1 tab(s) orally once a day (at bedtime)  clopidogrel 75 mg oral tablet: 1 tab(s) orally once a day  clopidogrel 75 mg oral tablet: 1 tab(s) orally once a day  ferrous sulfate 325 mg (65 mg elemental iron) oral tablet: 1 tab(s) orally 2 times a day  lisinopril 10 mg oral tablet: 1 tab(s) orally once a day  metoprolol tartrate 25 mg oral tablet: 1 tab(s) orally 2 times a day  pantoprazole 40 mg oral delayed release tablet: 1 tab(s) orally once a day (before a meal)  senna oral tablet: 2 tab(s) orally once a day (at bedtime), As needed, Constipation

## 2021-06-03 NOTE — DISCHARGE NOTE PROVIDER - HOSPITAL COURSE
79 y/o female with PMHx significant for Paroxysmal Afib (on ASA, DOAC unaffordable, previously on Eliquis), HTN, diverticulitis presents to the ER with a two month history of chest pain sent in by her Cardiologist after office visit 6/2. Patient states that she started to experience a burning sensation to mid chest area in April, which initially presents as a burning sensation on exertion then becomes more of a painful sensation with prolonged exertion, with intensity reaching ~ 6/10, pain resolved with resting for ~ 5 minutes, non radiating, without accompanying symptoms. Patient denies SOB, palpitations, dizziness, PND, N/V, leg edema. Patient states that she initially attributed her symptoms to reflux, however symptoms were not alleviated with treatment for GERD. In ER patient is asymptomatic, EKG done revealing NSR, no ischemic changes noted, on telemetry sinus edward cardia with rate 58/min. Initial troponin negative, CXR without acute pathology. Patient underwent unsuccessful left heart catheterization via RRA yesterday with Dr. Lackey 2/2 multiple spasms. She was admitted for overnight monitoring and plan for PCI to LAD / Diagonal today. Was started on Plavix and Aspirin. Now s/p LHC via RFA which required 1 NOBLE to proximal LAD; pre and post procedure balloon dilation. Patient to follow with Dr. Monae in 2 weeks.        79 y/o female with PMHx significant for Paroxysmal Afib (on ASA, DOAC unaffordable, previously on Eliquis), HTN, diverticulitis presents to the ER with a two month history of chest pain sent in by her Cardiologist after office visit 6/2. Patient states that she started to experience a burning sensation to mid chest area in April, which initially presents as a burning sensation on exertion then becomes more of a painful sensation with prolonged exertion, with intensity reaching ~ 6/10, pain resolved with resting for ~ 5 minutes, non radiating, without accompanying symptoms. Patient states that she initially attributed her symptoms to reflux, however symptoms were not alleviated with treatment for GERD. In ER patient is asymptomatic, EKG done revealing NSR, no ischemic changes noted, on telemetry sinus edward cardia with rate 58/min. Initial troponin negative, CXR without acute pathology. Patient underwent unsuccessful left heart catheterization via RRA yesterday with Dr. Lackey 2/2 multiple spasms. She was admitted for overnight monitoring and plan for PCI to LAD / Diagonal today. Was started on Plavix and Aspirin. Patient underwent LHC via RFA which required 1 NOBLE to proximal LAD; pre and post procedure balloon dilation. After procedure, she was noted to have hypotension, bradycardia, nausea and dizziness. Found to have right groin hematoma. She was given Norsynephrine, Atropine, Protamine and NS bolus. CT showed retroperitoneal hemorrhage. She was transferred to ICU and was monitored closely. She has since been downgraded to Medicine Service and remains stable. Patient at this time is stable for discharge home.    Vital Signs Last 24 Hrs  T(C): 37 (07 Jun 2021 10:00), Max: 37.1 (06 Jun 2021 15:35)  T(F): 98.6 (07 Jun 2021 10:00), Max: 98.8 (06 Jun 2021 15:35)  HR: 81 (07 Jun 2021 10:00) (65 - 89)  BP: 112/66 (07 Jun 2021 10:00) (112/66 - 139/79)  BP(mean): --  RR: 18 (07 Jun 2021 10:00) (18 - 18)  SpO2: 100% (07 Jun 2021 10:00) (95% - 100%)    GENERAL: NAD, sitting comfortably  HEAD: Atraumatic, Normocephalic  EYES: EOMI, conjunctiva and sclera clear  ENMT: Moist mucous membranes  NECK: Supple, No JVD  NERVOUS SYSTEM: A&OX3, Good concentration  CHEST/LUNG: Clear to auscultation b/l; Unlabored respirations  HEART: S1S2+, Regular rate and rhythm  ABDOMEN: Soft, Nontender, Nondistended; BS+. +Right groin ecchymosis; stable.  EXTREMITIES: No LE edema  SKIN: Warm and dry    Discharge planning time: 35 minutes       81 y/o female with PMHx significant for Paroxysmal Afib (on ASA, DOAC unaffordable, previously on Eliquis), HTN, diverticulitis presents to the ER with a two month history of chest pain sent in by her Cardiologist after office visit 6/2. Patient states that she started to experience a burning sensation to mid chest area in April, which initially presents as a burning sensation on exertion then becomes more of a painful sensation with prolonged exertion, with intensity reaching ~ 6/10, pain resolved with resting for ~ 5 minutes, non radiating, without accompanying symptoms. Patient states that she initially attributed her symptoms to reflux, however symptoms were not alleviated with treatment for GERD. In ER patient is asymptomatic, EKG done revealing NSR, no ischemic changes noted, on telemetry sinus edward cardia with rate 58/min. Initial troponin negative, CXR without acute pathology. Patient underwent unsuccessful left heart catheterization via RRA with Dr. Lackey 2/2 multiple spasms. She was admitted for overnight monitoring and plan for PCI to LAD / Diagonal. Was started on Plavix and Aspirin. Patient underwent LHC via RFA which required 1 NOBLE to proximal LAD; pre and post procedure balloon dilation. After procedure, she was noted to have hypotension, bradycardia, nausea and dizziness. Found to have right groin hematoma. She was given Norsynephrine, Atropine, Protamine and NS bolus. CT showed retroperitoneal hemorrhage. She was transferred to ICU and was monitored closely. She has since been downgraded to Medicine Service and remains stable. Patient at this time is stable for discharge home.    PHYSICAL EXAM:  Vital Signs   T(C): 37 (07 Jun 2021 10:00), Max: 37.1 (06 Jun 2021 15:35)  T(F): 98.6 (07 Jun 2021 10:00), Max: 98.8 (06 Jun 2021 15:35)  HR: 81 (07 Jun 2021 10:00) (65 - 89)  BP: 112/66 (07 Jun 2021 10:00) (112/66 - 139/79)  RR: 18 (07 Jun 2021 10:00) (18 - 18)  SpO2: 100% (07 Jun 2021 10:00) (95% - 100%)  GENERAL: NAD, sitting comfortably  HEAD: Atraumatic, Normocephalic  EYES: EOMI, conjunctiva and sclera clear  ENMT: Moist mucous membranes  NECK: Supple, No JVD  NERVOUS SYSTEM: A&OX3, Good concentration  CHEST/LUNG: Clear to auscultation b/l; Unlabored respirations  HEART: S1S2+, Regular rate and rhythm  ABDOMEN: Soft, Nontender, Nondistended; BS+. +Right groin ecchymosis; stable.  EXTREMITIES: No LE edema  SKIN: Warm and dry    Discharge planning time: 35 minutes

## 2021-06-03 NOTE — DISCHARGE NOTE PROVIDER - NSDCACTIVITY_GEN_ALL_CORE
Showering allowed/Stairs allowed/Walking - Indoors allowed/No heavy lifting/straining/Walking - Outdoors allowed Do not drive or operate machinery/Showering allowed/Stairs allowed/Walking - Indoors allowed/No heavy lifting/straining/Walking - Outdoors allowed

## 2021-06-03 NOTE — DISCHARGE NOTE PROVIDER - PROVIDER TOKENS
PROVIDER:[TOKEN:[[id number]]] PROVIDER:[TOKEN:[[id number]]],FREE:[LAST:[PMD],PHONE:[(   )    -],FAX:[(   )    -],ADDRESS:[Primary Medical Doctor],FOLLOWUP:[1-3 days]]

## 2021-06-03 NOTE — DISCHARGE NOTE PROVIDER - CARE PROVIDER_API CALL
Charly Monae)  Cardiovascular Disease  [address], [place] [address]  Phone: [phone]  Fax: [phone]  Follow Up Time:    Charly Monae)  Cardiovascular Disease  [address], [address]  [place] [address]  Phone: [phone]  Fax: [phone]  Follow Up Time:     PMD,   Primary Medical Doctor  Phone: (   )    -  Fax: (   )    -  Follow Up Time: 1-3 days

## 2021-06-03 NOTE — PROGRESS NOTE ADULT - SUBJECTIVE AND OBJECTIVE BOX
Department of Cardiology                                                                  [facility]/[place] [address]                                                            Telephone: [phone]. Fax:[phone]                                                                     Patient is a 80y old  Female who presents with a chief complaint of Chest pain (03 Jun 2021 17:56)      HPI:  81 y/o female with PMHx significant for Paroxysmal Afib (on ASA, DOAC unaffordable, previously on Eliquis), HTN, diverticulitis presents to the ER with a two month history of chest pain sent in by her Cardiologist after office visit today. Patient states that she started to experience a burning sensation to mid chest area in April, which initially presents as a burning sensation on exertion then becomes more of a painful sensation with prolonged exertion, with intensity reaching ~ 6/10, pain resolved with resting for ~ 5 minutes, non radiating, without accompanying symptoms. Patient denies SOB, palpitations, dizziness, PND, N/V, leg edema. Patient states that she initially attributed her symptoms to reflux, however symptoms were not alleviated with treatment for GERD. In ER patient is asymptomatic, EKG done revealing NSR, no ischemic changes noted, on telemetry sinus edward cardia with rate 58/min. Initial troponin negative, CXR without acute pathology.  (02 Jun 2021 16:36)    Now s/p LHC via RRA by Dr. Lackey: significant LAD/Diag bifurcation stenosis (prelim result; official result to follow)      PAST MEDICAL & SURGICAL HISTORY:  Diverticulitis    Abscess  abdominal abcess    Essential hypertension    Paroxysmal atrial fibrillation    No significant past surgical history        PREVIOUS DIAGNOSTIC TESTING:      ECHO  FINDINGS: < from: TTE Echo Complete w/Doppler (06.25.18 @ 16:00) >  Summary:   1. Mildly decreased global left ventricular systolic function. Left   ventricular ejection fraction, by visual estimation, is 45 to 50%.   2. RV systolic function is normal.   3. Biatrial enlargement.   4. No significant valvular abnormalities.   5. No pericardial effusion.   6. ** No prior echocardiograms available for comparison.    < end of copied text >      STRESS  FINDINGS: < from: Nuclear Stress Test-Pharmacologic (08.01.18 @ 08:42) >  NUCLEAR FINDINGS:  Review of raw data shows: Minor motion artifact.  The left ventricle was normal in size. Normal myocardial  perfusion scan, with no evidence of infarction or  inducible ischemia.There is a small defect in apical wall  that is predominantly fixed consistent with a prominent  apical slit..  ------------------------------------------------------------------------      GATED ANALYSIS:  Gated wall motion analysis is performed, and shows normal  wall motion with post stress LVEF of 77%.  ------------------------------------------------------------------------      LV/RV OBSERVATION:  Normal LV ejection fraction.  ------------------------------------------------------------------------    IMPRESSIONS:Normal Study  * Review of raw data shows: Minor motion artifact.  * The left ventricle was normal in size. Normal myocardial  perfusion scan, with no evidence of infarction or  inducible ischemia.There is a small defect in apical wall  that is predominantly fixed consistent with a prominent  apical slit..  * Gated wall motion analysis is performed, and shows  normal wall motion with post stress LVEF of 77%.  * Chest Pain:No chest pain with administration of  Regadenoson.  * Symptom: Shortness of breath, nausea, headache.  Rhythm - 66 BPM.  * Baseline ECG: Nonspecific ST abnormality.  * ECG Abnormalities: There were no diagnostic changes.  * Arrhythmia: None.      < end of copied text >      Allergies:  penicillin (Unknown)      MEDICATIONS  (STANDING):  amLODIPine   Tablet 5 milliGRAM(s) Oral daily  atorvastatin 40 milliGRAM(s) Oral at bedtime  lisinopril 10 milliGRAM(s) Oral daily  metoprolol succinate ER 25 milliGRAM(s) Oral daily    MEDICATIONS  (PRN):  ondansetron Injectable 4 milliGRAM(s) IV Push every 6 hours PRN Nausea      FAMILY HISTORY:  No pertinent family history in first degree relatives      REVIEW OF SYSTEMS:  CONSTITUTIONAL: No fever, weight loss, or fatigue  ENMT:  No difficulty hearing, tinnitus, vertigo; No sinus or throat pain  NECK: No pain or stiffness  RESPIRATORY: No Shortness of Breath, [place]  CARDIOVASCULAR: see HPI  GASTROINTESTINAL: No abdominal or epigastric pain. No nausea, vomiting, or hematemesis; No diarrhea or constipation. No melena or hematochezia.  GENITOURINARY: No dysuria, frequency  NEUROLOGICAL: No headaches, memory loss, loss of strength, numbness, or tremors  MUSCULOSKELETAL: No joint pain or swelling; No muscle, back, or extremity pain  PSYCHIATRIC: No depression, anxiety, mood swings, or difficulty sleeping    Vital Signs Last 24 Hrs  T(C): 36.8 (03 Jun 2021 14:05), Max: 36.9 (03 Jun 2021 11:28)  T(F): 98.3 (03 Jun 2021 14:05), Max: 98.4 (03 Jun 2021 11:28)  HR: 54 (03 Jun 2021 17:45) (52 - 63)  BP: 172/61 (03 Jun 2021 17:45) (126/68 - 172/61)  BP(mean): --  RR: 16 (03 Jun 2021 17:45) (15 - 18)  SpO2: 97% (03 Jun 2021 17:30) (97% - 99%)      PHYSICAL EXAM:  Appearance: Normal, well nourished	  Cardiovascular: Normal S1 S2, No JVD, No cardiac murmurs, No carotid bruits, No peripheral edema  Respiratory: Lungs clear to auscultation	  Psychiatry: A & O x 3, Mood & affect appropriate  Gastrointestinal:  Soft, Non-tender, + BS, no bruits	  Neurologic: Grossly non-focal with full strength in all four extremities  Extremities: Normal range of motion, No clubbing, cyanosis or edema  Vascular: Peripheral pulses palpable 2+ bilaterally  Procedure site: Right radial band in place; site benign without hematoma/bleeding; RUE warm/mobile/acyanotic; + right ulnar pulse      INTERPRETATION OF TELEMETRY in CCL RR: NSR 60s    ECG: < from: 12 Lead ECG (06.03.21 @ 07:35) >  Ventricular Rate 56 BPM    Atrial Rate 56 BPM    P-R Interval 148 ms    QRS Duration 82 ms    Q-T Interval 428 ms    QTC Calculation(Bazett) 413 ms    P Axis 5 degrees    R Axis 53 degrees    T Axis 45 degrees    Diagnosis Line Sinus bradycardia    < end of copied text >      LABS:                        12.0   7.63  )-----------( 333      ( 02 Jun 2021 14:15 )             36.3     06-03    140  |  108<H>  |  19.6  ----------------------------<  94  4.4   |  22.0  |  0.72    Ca    8.9      03 Jun 2021 07:19  Phos  4.3     06-03  Mg     2.0     06-03    TPro  6.3<L>  /  Alb  3.7  /  TBili  0.3<L>  /  DBili  x   /  AST  15  /  ALT  12  /  AlkPhos  75  06-03    CARDIAC MARKERS ( 02 Jun 2021 23:04 )  x     / <0.01 ng/mL / x     / x     / x      CARDIAC MARKERS ( 02 Jun 2021 14:15 )  x     / <0.01 ng/mL / x     / x     / x              I&O's Summary    BNP  Serum Pro-Brain Natriuretic Peptide: 127 pg/mL (06-02-21 @ 14:15)    RADIOLOGY & ADDITIONAL STUDIES:  < from: TTE Echo Complete w/o Contrast w/ Doppler (06.02.21 @ 19:06) >  PHYSICIAN INTERPRETATION:  Left Ventricle: The left ventricular internal cavity size is normal.  Global LV systolic function was normal. Left ventricular ejection fraction, by visual estimation, is 60 to 65%. Spectral Doppler shows normal pattern of LV diastolicfilling.  Right Ventricle: The right ventricular size is mildly enlarged. RV systolic function is normal.  Left Atrium: The left atrium is normal in size.  Right Atrium: The right atrium is normal in size.  Pericardium: There is no evidence of pericardial effusion.  Mitral Valve: Thickening of the anterior and posterior mitral valve leaflets. There is mild mitral annular calcification. Mild mitral valve regurgitation is seen.  Tricuspid Valve: The tricuspid valve is normal in structure. Mild tricuspid regurgitation is visualized.  Aortic Valve: The aortic valve is trileaflet. Sclerotic aortic valve with normal opening. No evidence of aortic valve regurgitation is seen.  Pulmonic Valve: The pulmonic valve was not well visualized. Trace pulmonic valve regurgitation.  Aorta: The aortic root is normal in size and structure.  Pulmonary Artery: The pulmonary artery is of normal size and origin.  Venous: The pulmonary veins appear normal. The inferior vena cava is normal. The inferior vena cava was normal sized, with respiratory size variation greater than 50%.  In comparison to the previous echocardiogram(s): Prior examinations are available and were reviewed for comparison purposes.      Summary:   1. Normal left ventricular internal cavity size.   2. Normal global left ventricular systolic function.   3. Left ventricular ejection fraction, by visual estimation, is 60 to 65%.   4. Mildly enlarged right ventricle with normal systolic function.   5. The right atrium is normal in size.   6. The left atrium is normal in size.   7. Sclerotic aortic valve with normal opening.   8. Thickening of the anterior and posterior mitral valve leaflets.   9. Mild mitral annular calcification.  10. Mild mitral valve regurgitation.  11. Mild tricuspid regurgitation.    < end of copied text >

## 2021-06-03 NOTE — PROGRESS NOTE ADULT - ASSESSMENT
A/P: Pt is a 81 y/o female with medical history of pAF (was on Eliquis but currently, no AC d/t cost), HTN, diverticulitis, who presents to [facility]-ED for chest pain. Pt states that she has been experiencing a chest burning sensation since April. Chest discomfort described as burning that changes to pain with prolonged exertion, non-radiating, lasting 5 minutes or until she rests. Pt states rest relieves symptoms. Pt saw PCP who recommended she start Pepcid which did not relieve symptoms. Pt saw cardiologist Dr. Monae in [facility] office today and she was advised to seek emergent medical care to evaluate symptoms. In ED, ECG- NSR HR @ 62, XRAY-No focal consolidation or pleural effusion, Trop#1-negative. Pt denies feeling chest discomfort at time of physical assessment.  Now s/p LHC via RRA by Dr. Lackey: significant LAD/Diag bifurcation stenosis (prelim result; official result to follow).  -Admit to tele secondary obstructive CAD requiring PCI in am   -Bedrest x 1 hour post procedure then OOB  -Remove radial band x 1 hour post procedure  -Plan for LAD/Diag PCI in am (secondary to multiple spasms via RRA approach today) with Dr. Lackey via RFA approach  -NPO after MN for procedure in am  -Plavix 600mg PO x one now then 75mg daily  -Add ASA 81mg daily  -Maintain beta blocker and statin   -Maintain amlodipine, Toprol XL, Lisinopril  for HTN  -Discussed with Dr. Lackey  -Plan final once reviewed by Attending MD

## 2021-06-03 NOTE — DISCHARGE NOTE PROVIDER - CARE PROVIDERS DIRECT ADDRESSES
[email or web address]

## 2021-06-03 NOTE — DISCHARGE NOTE PROVIDER - NSDCCPTREATMENT_GEN_ALL_CORE_FT
PRINCIPAL PROCEDURE  Procedure: Left heart catheterization  Findings and Treatment: s/p LHC via RFA which required 1 NOBLE to proximal LAD; pre and post procedure balloon dilation. Patient to continue with Aspirin 81 mg and Plavix 75 mg daily. Continue with metoprolol and lisinopril and atorvastatin. Follow with Dr. Monae in 2 weeks.

## 2021-06-03 NOTE — DISCHARGE NOTE PROVIDER - NSDCFUADDINST_GEN_ALL_CORE_FT
Choose lean meats and poultry without skin and prepare them without added saturated and trans fat.  Eat fish at least twice a week. Recent research shows that eating oily fish containing omega-3 fatty acids (for example, salmon, trout and herring) may help lower your risk of death from coronary artery disease.  Select fat-free, 1 percent fat and low-fat dairy products.  Cut back on foods containing partially hydrogenated vegetable oils to reduce trans fat in your diet.   To lower cholesterol, reduce saturated fat to no more than 5 to 6 percent of total calories. For someone eating 2,000 calories a day, that’s about 13 grams of saturated fat.  Cut back on beverages and foods with added sugars.  Choose and prepare foods with little or no salt. To lower blood pressure, aim to eat no more than 2,400 milligrams of sodium per day. Reducing daily intake to 1,500 mg is desirable because it can lower blood pressure even further.  If you drink alcohol, drink in moderation. That means one drink per day if you’re a woman and two drinks  per day if you’re a man.  Follow the American Heart Association recommendations when you eat out, and keep an eye on your portion sizes.   No heavy lifting, driving, sex, tub baths, swimming, or any activity that submerges the lower half of the body in water for 48 hours.  Limited walking and stairs for 48 hours.    Change the bandaid after 24 hours and every 24 hours after that.  Keep the puncture site dry and covered with a bandaid until a scab forms.    Observe the site frequently.  If bleeding or a large lump (the size of a golf ball or bigger) occurs lie flat, apply continuous direct pressure just above the puncture site for at least 10 minutes, and notify your physician immediately.  If the bleeding cannot be controlled, call 911 immediately for assistance.  Notify your physician of pain, swelling or any drainage.    Notify your physician immediately if coldness, numbness, discoloration or pain in your foot occurs.    cardiac rehab info provided/referral and communication to cardiac rehab completed   No heavy lifting, driving, sex, tub baths, swimming, or any activity that submerges the lower half of the body in water for 48 hours.  Limited walking and stairs for 48 hours.    Change the bandaid after 24 hours and every 24 hours after that.  Keep the puncture site dry and covered with a bandaid until a scab forms.    Observe the site frequently.  If bleeding or a large lump (the size of a golf ball or bigger) occurs lie flat, apply continuous direct pressure just above the puncture site for at least 10 minutes, and notify your physician immediately.  If the bleeding cannot be controlled, call 911 immediately for assistance.  Notify your physician of pain, swelling or any drainage.    Notify your physician immediately if coldness, numbness, discoloration or pain in your foot occurs.    cardiac rehab info provided/referral and communication to cardiac rehab completed    If with recurrence or worsening of symptoms call 911 or go to the nearest ER immediately.

## 2021-06-03 NOTE — PROGRESS NOTE ADULT - SUBJECTIVE AND OBJECTIVE BOX
Cardiology NP preprocedure note:    -For [facility]    EKG: < from: 12 Lead ECG (06.03.21 @ 07:35) >  Ventricular Rate 56 BPM    Atrial Rate 56 BPM    P-R Interval 148 ms    QRS Duration 82 ms    Q-T Interval 428 ms    QTC Calculation(Bazett) 413 ms    P Axis 5 degrees    R Axis 53 degrees    T Axis 45 degrees    Diagnosis Line Sinus bradycardia    Confirmed by QUITA REID ([id number]) on 6/3/2021 11:20:47 AM    < end of copied text >    TELE: NSB 50s    MEDICATIONS  (STANDING):  amLODIPine   Tablet 5 milliGRAM(s) Oral daily  aspirin  chewable 81 milliGRAM(s) Oral once  atorvastatin 40 milliGRAM(s) Oral at bedtime  lisinopril 10 milliGRAM(s) Oral daily  metoprolol succinate ER 25 milliGRAM(s) Oral daily    MEDICATIONS  (PRN):  ondansetron Injectable 4 milliGRAM(s) IV Push every 6 hours PRN Nausea      Allergies:  penicillin (Unknown)      PAST MEDICAL & SURGICAL HISTORY:  Diverticulitis    Abscess  abdominal abcess    Essential hypertension    Paroxysmal atrial fibrillation    No significant past surgical history        Vital Signs Last 24 Hrs  T(C): 36.9 (03 Jun 2021 11:28), Max: 36.9 (03 Jun 2021 11:28)  T(F): 98.4 (03 Jun 2021 11:28), Max: 98.4 (03 Jun 2021 11:28)  HR: 59 (03 Jun 2021 11:28) (55 - 63)  BP: 157/68 (03 Jun 2021 11:28) (126/68 - 176/79)  BP(mean): --  RR: 18 (03 Jun 2021 11:28) (16 - 18)  SpO2: 99% (03 Jun 2021 11:28) (97% - 99%)    Physical Exam:  Constitutional: NAD, AAOx3  Cardiovascular: +S1S2 RRR  Pulmonary: CTA b/l, unlabored  GI: soft NTND +BS  Extremities: no pedal edema, +distal pulses b/l  Neuro: non focal, FELDMAN x4  ASA 2  Mallampati 2  GFR 79  BRA 2.7%    LABS:                        12.0   7.63  )-----------( 333      ( 02 Jun 2021 14:15 )             36.3     06-03    140  |  108<H>  |  19.6  ----------------------------<  94  4.4   |  22.0  |  0.72    Ca    8.9      03 Jun 2021 07:19  Phos  4.3     06-03  Mg     2.0     06-03    TPro  6.3<L>  /  Alb  3.7  /  TBili  0.3<L>  /  DBili  x   /  AST  15  /  ALT  12  /  AlkPhos  75  06-03          RADIOLOGY & ADDITIONAL TESTS:  < from: TTE Echo Complete w/o Contrast w/ Doppler (06.02.21 @ 19:06) >  PHYSICIAN INTERPRETATION:  Left Ventricle: The left ventricular internal cavity size is normal.  Global LV systolic function was normal. Left ventricular ejection fraction, by visual estimation, is 60 to 65%. Spectral Doppler shows normal pattern of LV diastolicfilling.  Right Ventricle: The right ventricular size is mildly enlarged. RV systolic function is normal.  Left Atrium: The left atrium is normal in size.  Right Atrium: The right atrium is normal in size.  Pericardium: There is no evidence of pericardial effusion.  Mitral Valve: Thickening of the anterior and posterior mitral valve leaflets. There is mild mitral annular calcification. Mild mitral valve regurgitation is seen.  Tricuspid Valve: The tricuspid valve is normal in structure. Mild tricuspid regurgitation is visualized.  Aortic Valve: The aortic valve is trileaflet. Sclerotic aortic valve with normal opening. No evidence of aortic valve regurgitation is seen.  Pulmonic Valve: The pulmonic valve was not well visualized. Trace pulmonic valve regurgitation.  Aorta: The aortic root is normal in size and structure.  Pulmonary Artery: The pulmonary artery is of normal size and origin.  Venous: The pulmonary veins appear normal. The inferior vena cava is normal. The inferior vena cava was normal sized, with respiratory size variation greater than 50%.  In comparison to the previous echocardiogram(s): Prior examinations are available and were reviewed for comparison purposes.      Summary:   1. Normal left ventricular internal cavity size.   2. Normal global left ventricular systolic function.   3. Left ventricular ejection fraction, by visual estimation, is 60 to 65%.   4. Mildly enlarged right ventricle with normal systolic function.   5. The right atrium is normal in size.   6. The left atrium is normal in size.   7. Sclerotic aortic valve with normal opening.   8. Thickening of the anterior and posterior mitral valve leaflets.   9. Mild mitral annular calcification.  10. Mild mitral valve regurgitation.  11. Mild tricuspid regurgitation.      < end of copied text >

## 2021-06-04 DIAGNOSIS — I25.10 ATHEROSCLEROTIC HEART DISEASE OF NATIVE CORONARY ARTERY WITHOUT ANGINA PECTORIS: ICD-10-CM

## 2021-06-04 DIAGNOSIS — R58 HEMORRHAGE, NOT ELSEWHERE CLASSIFIED: ICD-10-CM

## 2021-06-04 LAB
ANION GAP SERPL CALC-SCNC: 10 MMOL/L — SIGNIFICANT CHANGE UP (ref 5–17)
ANION GAP SERPL CALC-SCNC: 11 MMOL/L — SIGNIFICANT CHANGE UP (ref 5–17)
BASOPHILS # BLD AUTO: 0.05 K/UL — SIGNIFICANT CHANGE UP (ref 0–0.2)
BASOPHILS NFR BLD AUTO: 0.8 % — SIGNIFICANT CHANGE UP (ref 0–2)
BUN SERPL-MCNC: 18.6 MG/DL — SIGNIFICANT CHANGE UP (ref 8–20)
BUN SERPL-MCNC: 20.9 MG/DL — HIGH (ref 8–20)
CALCIUM SERPL-MCNC: 8.8 MG/DL — SIGNIFICANT CHANGE UP (ref 8.6–10.2)
CALCIUM SERPL-MCNC: 9 MG/DL — SIGNIFICANT CHANGE UP (ref 8.6–10.2)
CHLORIDE SERPL-SCNC: 107 MMOL/L — SIGNIFICANT CHANGE UP (ref 98–107)
CHLORIDE SERPL-SCNC: 107 MMOL/L — SIGNIFICANT CHANGE UP (ref 98–107)
CO2 SERPL-SCNC: 20 MMOL/L — LOW (ref 22–29)
CO2 SERPL-SCNC: 21 MMOL/L — LOW (ref 22–29)
CREAT SERPL-MCNC: 0.7 MG/DL — SIGNIFICANT CHANGE UP (ref 0.5–1.3)
CREAT SERPL-MCNC: 0.77 MG/DL — SIGNIFICANT CHANGE UP (ref 0.5–1.3)
EOSINOPHIL # BLD AUTO: 0.26 K/UL — SIGNIFICANT CHANGE UP (ref 0–0.5)
EOSINOPHIL NFR BLD AUTO: 4 % — SIGNIFICANT CHANGE UP (ref 0–6)
GLUCOSE SERPL-MCNC: 111 MG/DL — HIGH (ref 70–99)
GLUCOSE SERPL-MCNC: 90 MG/DL — SIGNIFICANT CHANGE UP (ref 70–99)
HCT VFR BLD CALC: 31.1 % — LOW (ref 34.5–45)
HCT VFR BLD CALC: 33.1 % — LOW (ref 34.5–45)
HCT VFR BLD CALC: 33.6 % — LOW (ref 34.5–45)
HCT VFR BLD CALC: 34.4 % — LOW (ref 34.5–45)
HGB BLD-MCNC: 10.4 G/DL — LOW (ref 11.5–15.5)
HGB BLD-MCNC: 10.9 G/DL — LOW (ref 11.5–15.5)
HGB BLD-MCNC: 10.9 G/DL — LOW (ref 11.5–15.5)
HGB BLD-MCNC: 11 G/DL — LOW (ref 11.5–15.5)
IMM GRANULOCYTES NFR BLD AUTO: 0.3 % — SIGNIFICANT CHANGE UP (ref 0–1.5)
LYMPHOCYTES # BLD AUTO: 1.69 K/UL — SIGNIFICANT CHANGE UP (ref 1–3.3)
LYMPHOCYTES # BLD AUTO: 26.3 % — SIGNIFICANT CHANGE UP (ref 13–44)
MCHC RBC-ENTMCNC: 29.9 PG — SIGNIFICANT CHANGE UP (ref 27–34)
MCHC RBC-ENTMCNC: 30.1 PG — SIGNIFICANT CHANGE UP (ref 27–34)
MCHC RBC-ENTMCNC: 30.2 PG — SIGNIFICANT CHANGE UP (ref 27–34)
MCHC RBC-ENTMCNC: 30.5 PG — SIGNIFICANT CHANGE UP (ref 27–34)
MCHC RBC-ENTMCNC: 31.7 GM/DL — LOW (ref 32–36)
MCHC RBC-ENTMCNC: 32.7 GM/DL — SIGNIFICANT CHANGE UP (ref 32–36)
MCHC RBC-ENTMCNC: 32.9 GM/DL — SIGNIFICANT CHANGE UP (ref 32–36)
MCHC RBC-ENTMCNC: 33.4 GM/DL — SIGNIFICANT CHANGE UP (ref 32–36)
MCV RBC AUTO: 91.2 FL — SIGNIFICANT CHANGE UP (ref 80–100)
MCV RBC AUTO: 91.7 FL — SIGNIFICANT CHANGE UP (ref 80–100)
MCV RBC AUTO: 92.1 FL — SIGNIFICANT CHANGE UP (ref 80–100)
MCV RBC AUTO: 94.5 FL — SIGNIFICANT CHANGE UP (ref 80–100)
MONOCYTES # BLD AUTO: 0.68 K/UL — SIGNIFICANT CHANGE UP (ref 0–0.9)
MONOCYTES NFR BLD AUTO: 10.6 % — SIGNIFICANT CHANGE UP (ref 2–14)
NEUTROPHILS # BLD AUTO: 3.72 K/UL — SIGNIFICANT CHANGE UP (ref 1.8–7.4)
NEUTROPHILS NFR BLD AUTO: 58 % — SIGNIFICANT CHANGE UP (ref 43–77)
PLATELET # BLD AUTO: 218 K/UL — SIGNIFICANT CHANGE UP (ref 150–400)
PLATELET # BLD AUTO: 237 K/UL — SIGNIFICANT CHANGE UP (ref 150–400)
PLATELET # BLD AUTO: 277 K/UL — SIGNIFICANT CHANGE UP (ref 150–400)
PLATELET # BLD AUTO: 282 K/UL — SIGNIFICANT CHANGE UP (ref 150–400)
POTASSIUM SERPL-MCNC: 4.2 MMOL/L — SIGNIFICANT CHANGE UP (ref 3.5–5.3)
POTASSIUM SERPL-MCNC: 4.3 MMOL/L — SIGNIFICANT CHANGE UP (ref 3.5–5.3)
POTASSIUM SERPL-SCNC: 4.2 MMOL/L — SIGNIFICANT CHANGE UP (ref 3.5–5.3)
POTASSIUM SERPL-SCNC: 4.3 MMOL/L — SIGNIFICANT CHANGE UP (ref 3.5–5.3)
RBC # BLD: 3.41 M/UL — LOW (ref 3.8–5.2)
RBC # BLD: 3.61 M/UL — LOW (ref 3.8–5.2)
RBC # BLD: 3.64 M/UL — LOW (ref 3.8–5.2)
RBC # BLD: 3.65 M/UL — LOW (ref 3.8–5.2)
RBC # FLD: 12.5 % — SIGNIFICANT CHANGE UP (ref 10.3–14.5)
RBC # FLD: 12.7 % — SIGNIFICANT CHANGE UP (ref 10.3–14.5)
SODIUM SERPL-SCNC: 137 MMOL/L — SIGNIFICANT CHANGE UP (ref 135–145)
SODIUM SERPL-SCNC: 138 MMOL/L — SIGNIFICANT CHANGE UP (ref 135–145)
WBC # BLD: 10.67 K/UL — HIGH (ref 3.8–10.5)
WBC # BLD: 10.76 K/UL — HIGH (ref 3.8–10.5)
WBC # BLD: 6.42 K/UL — SIGNIFICANT CHANGE UP (ref 3.8–10.5)
WBC # BLD: 7.37 K/UL — SIGNIFICANT CHANGE UP (ref 3.8–10.5)
WBC # FLD AUTO: 10.67 K/UL — HIGH (ref 3.8–10.5)
WBC # FLD AUTO: 10.76 K/UL — HIGH (ref 3.8–10.5)
WBC # FLD AUTO: 6.42 K/UL — SIGNIFICANT CHANGE UP (ref 3.8–10.5)
WBC # FLD AUTO: 7.37 K/UL — SIGNIFICANT CHANGE UP (ref 3.8–10.5)

## 2021-06-04 PROCEDURE — 93010 ELECTROCARDIOGRAM REPORT: CPT

## 2021-06-04 PROCEDURE — 93010 ELECTROCARDIOGRAM REPORT: CPT | Mod: 77

## 2021-06-04 PROCEDURE — 99232 SBSQ HOSP IP/OBS MODERATE 35: CPT

## 2021-06-04 PROCEDURE — 92978 ENDOLUMINL IVUS OCT C 1ST: CPT | Mod: 26,LD

## 2021-06-04 PROCEDURE — 99233 SBSQ HOSP IP/OBS HIGH 50: CPT

## 2021-06-04 PROCEDURE — 92928 PRQ TCAT PLMT NTRAC ST 1 LES: CPT | Mod: LD

## 2021-06-04 PROCEDURE — 74176 CT ABD & PELVIS W/O CONTRAST: CPT | Mod: 26

## 2021-06-04 PROCEDURE — 99152 MOD SED SAME PHYS/QHP 5/>YRS: CPT

## 2021-06-04 RX ORDER — ATORVASTATIN CALCIUM 80 MG/1
1 TABLET, FILM COATED ORAL
Qty: 0 | Refills: 0 | DISCHARGE
Start: 2021-06-04

## 2021-06-04 RX ORDER — ASPIRIN/CALCIUM CARB/MAGNESIUM 324 MG
1 TABLET ORAL
Qty: 90 | Refills: 3
Start: 2021-06-04 | End: 2022-05-29

## 2021-06-04 RX ORDER — ATROPINE SULFATE 0.1 MG/ML
1 SYRINGE (ML) INJECTION ONCE
Refills: 0 | Status: COMPLETED | OUTPATIENT
Start: 2021-06-04 | End: 2021-06-04

## 2021-06-04 RX ORDER — PHENYLEPHRINE HYDROCHLORIDE 10 MG/ML
0.2 INJECTION INTRAVENOUS
Qty: 160 | Refills: 0 | Status: DISCONTINUED | OUTPATIENT
Start: 2021-06-04 | End: 2021-06-04

## 2021-06-04 RX ORDER — AMLODIPINE BESYLATE 2.5 MG/1
1 TABLET ORAL
Qty: 0 | Refills: 0 | DISCHARGE
Start: 2021-06-04

## 2021-06-04 RX ORDER — PROTAMINE SULFATE 10 MG/ML
20 AMPUL (ML) INTRAVENOUS ONCE
Refills: 0 | Status: COMPLETED | OUTPATIENT
Start: 2021-06-04 | End: 2021-06-04

## 2021-06-04 RX ORDER — PHENYLEPHRINE HYDROCHLORIDE 10 MG/ML
6 INJECTION INTRAVENOUS ONCE
Refills: 0 | Status: COMPLETED | OUTPATIENT
Start: 2021-06-04 | End: 2021-06-04

## 2021-06-04 RX ORDER — CLOPIDOGREL BISULFATE 75 MG/1
1 TABLET, FILM COATED ORAL
Qty: 90 | Refills: 3
Start: 2021-06-04 | End: 2022-05-29

## 2021-06-04 RX ADMIN — Medication 25 MILLIGRAM(S): at 05:57

## 2021-06-04 RX ADMIN — ONDANSETRON 4 MILLIGRAM(S): 8 TABLET, FILM COATED ORAL at 12:01

## 2021-06-04 RX ADMIN — Medication 81 MILLIGRAM(S): at 08:44

## 2021-06-04 RX ADMIN — PHENYLEPHRINE HYDROCHLORIDE 6 MICROGRAM(S): 10 INJECTION INTRAVENOUS at 12:06

## 2021-06-04 RX ADMIN — Medication 650 MILLIGRAM(S): at 22:25

## 2021-06-04 RX ADMIN — AMLODIPINE BESYLATE 5 MILLIGRAM(S): 2.5 TABLET ORAL at 08:44

## 2021-06-04 RX ADMIN — Medication 20 MILLIGRAM(S): at 12:10

## 2021-06-04 RX ADMIN — Medication 1 MILLIGRAM(S): at 12:05

## 2021-06-04 RX ADMIN — ATORVASTATIN CALCIUM 40 MILLIGRAM(S): 80 TABLET, FILM COATED ORAL at 22:24

## 2021-06-04 RX ADMIN — CLOPIDOGREL BISULFATE 75 MILLIGRAM(S): 75 TABLET, FILM COATED ORAL at 08:44

## 2021-06-04 RX ADMIN — LISINOPRIL 10 MILLIGRAM(S): 2.5 TABLET ORAL at 08:44

## 2021-06-04 NOTE — PROGRESS NOTE ADULT - ASSESSMENT
81 y/o female with PMHx significant for Paroxysmal Afib (on ASA, DOAC unaffordable, previously on Eliquis), HTN, diverticulitis presents to the ER with a two month history of chest pain sent in by her Cardiologist after office visit today for evaluation of chest pain. In ER patient is asymptomatic, EKG done revealing NSR, no ischemic changes noted, on telemetry sinus edward cardia with rate 58/min. Initial troponin negative, CXR without acute pathology. Cardiology consulted and patient is scheduled for LC today.     #Chest pain due obstructive CAD   -LCH: Significant stenosis to LAD and diagonal bifurcation   -s/p ASA 325mg in ER   -Lipid panel reviewed: , T cholesterol: 167, HDL: 54  -NM stress test in 8/2018 with normal findings (done for eval of new onset AFib in 2018)  -TTE shows preserved EF of 60 -65%  -Continue Lisinopril 10mg daily, Metoprolol ER 25mg daily, Norvasc 5mg daily added   -Continue Lipitor 40mg po qhs   -Cardiology consult appreciated: scheduled for PCI today for stenosis to LAD and diagonal bifurcation via RFA   -S/P plavix loading, continue at 75mg daily and ASA  -Maintain NPO     #Hypertension   -c/w home meds w/ holding parameters: Lisinopril and metoprolol   -Norvasc 5mg daily added   -monitor BP & titrate BP meds PRN    #PAF  -On ASA  -DOAC unaffordable   -CHADSVASc: 4 (age, sex, HTN)  -NSR on EKG  -Sinus bradycardia on telemetry    #Hyperkalemia  -Resolved   -Continue to trend electrolytes     #VTE prophylaxis  -Will initiate post procedure   -SCDs for now       DICPO: PCI to LAD/ Diagonal scheduled for today     Above discussed with Dr. Headley

## 2021-06-04 NOTE — PROGRESS NOTE ADULT - SUBJECTIVE AND OBJECTIVE BOX
HPI: 81 y/o female with PMHx significant for Paroxysmal Afib (on ASA, DOAC unaffordable, previously on Eliquis), HTN, diverticulitis presents to the ER with a two month history of chest pain sent in by her Cardiologist after office visit today. Patient states that she started to experience a burning sensation to mid chest area in April, which initially presents as a burning sensation on exertion then becomes more of a painful sensation with prolonged exertion, with intensity reaching ~ 6/10, pain resolved with resting for ~ 5 minutes, non radiating, without accompanying symptoms. Patient denies SOB, palpitations, dizziness, PND, N/V, leg edema. Patient states that she initially attributed her symptoms to reflux, however symptoms were not alleviated with treatment for GERD. In ER patient is asymptomatic, EKG done revealing NSR, no ischemic changes noted, on telemetry sinus edward cardia with rate 58/min. Initial troponin negative, CXR without acute pathology.  (02 Jun 2021 16:36)    Subjective: Patient seen and examined at bedside, in no apparent distress, has no complaints to offer, s/p LHC via RRA yesterday with findings of significant LAD/Diag bifurcation stenosis, patient scheduled for PCI today via RFA. No overnight issues reported.        REVIEW OF SYSTEMS:    CONSTITUTIONAL: No weakness, fevers or chills  EYES/ENT: No visual changes;  No vertigo or throat pain   NECK: No pain or stiffness  RESPIRATORY: No cough, wheezing, hemoptysis; No shortness of breath  CARDIOVASCULAR: No chest pain or palpitations  GASTROINTESTINAL: No abdominal or epigastric pain. No nausea, vomiting, or hematemesis; No diarrhea or constipation. No melena or hematochezia.  GENITOURINARY: No dysuria, frequency or hematuria  NEUROLOGICAL: No numbness or weakness  SKIN: No itching, rashes    Vital Signs Last 24 Hrs  T(C): 36.8 (03 Jun 2021 14:05), Max: 36.9 (03 Jun 2021 11:28)  T(F): 98.3 (03 Jun 2021 14:05), Max: 98.4 (03 Jun 2021 11:28)  HR: 59 (04 Jun 2021 07:45) (52 - 64)  BP: 136/56 (04 Jun 2021 07:45) (107/45 - 172/61)  BP(mean): 76 (04 Jun 2021 07:45) (76 - 76)  RR: 18 (04 Jun 2021 07:45) (15 - 20)  SpO2: 100% (04 Jun 2021 07:45) (97% - 100%)    PHYSICAL EXAM:  GENERAL: NAD, lying in bed comfortably  HEAD:  Atraumatic, Normocephalic  EYES: conjunctiva and sclera clear  ENT: Moist mucous membranes  NECK: Supple, No JVD  CHEST/LUNG: Clear to auscultation bilaterally; No rales, rhonchi, wheezing, or rubs. Unlabored respirations  HEART: Regular rhythm; No murmurs, rubs, or gallops  ABDOMEN: Bowel sounds present; Soft, Nontender, Nondistended.  EXTREMITIES:  2+ Peripheral Pulses, brisk capillary refill. No clubbing, cyanosis, or edema  NERVOUS SYSTEM:  Alert & Oriented X3, speech clear. No deficits   MSK: FROM all 4 extremities, full and equal strength      MEDICATIONS  (STANDING):  amLODIPine   Tablet 5 milliGRAM(s) Oral daily  aspirin  chewable 81 milliGRAM(s) Oral daily  atorvastatin 40 milliGRAM(s) Oral at bedtime  clopidogrel Tablet 75 milliGRAM(s) Oral daily  lisinopril 10 milliGRAM(s) Oral daily  metoprolol succinate ER 25 milliGRAM(s) Oral daily    MEDICATIONS  (PRN):  acetaminophen   Tablet .. 650 milliGRAM(s) Oral every 6 hours PRN Mild Pain (1 - 3)  ondansetron Injectable 4 milliGRAM(s) IV Push every 6 hours PRN Nausea          LABS:                          11.0   6.42  )-----------( 282      ( 04 Jun 2021 08:11 )             33.6   06-04    138  |  107  |  20.9<H>  ----------------------------<  90  4.2   |  21.0<L>  |  0.77    Ca    9.0      04 Jun 2021 08:11  Phos  4.3     06-03  Mg     2.0     06-03    TPro  6.3<L>  /  Alb  3.7  /  TBili  0.3<L>  /  DBili  x   /  AST  15  /  ALT  12  /  AlkPhos  75  06-03      LIVER FUNCTIONS - ( 03 Jun 2021 07:19 )  Alb: 3.7 g/dL / Pro: 6.3 g/dL / ALK PHOS: 75 U/L / ALT: 12 U/L / AST: 15 U/L / GGT: x               CARDIAC MARKERS ( 02 Jun 2021 23:04 )  x     / <0.01 ng/mL / x     / x     / x      CARDIAC MARKERS ( 02 Jun 2021 14:15 )  x     / <0.01 ng/mL / x     / x     / x              RADIOLOGY:    < from: TTE Echo Complete w/o Contrast w/ Doppler (06.02.21 @ 19:06) >    Summary:   1. Normal left ventricular internal cavity size.   2. Normal global left ventricular systolic function.   3. Left ventricular ejection fraction, by visual estimation, is 60 to 65%.   4. Mildly enlarged right ventricle with normal systolic function.   5. The right atrium is normal in size.   6. The left atrium is normal in size.   7. Sclerotic aortic valve with normal opening.   8. Thickening of the anterior and posterior mitral valve leaflets.   9. Mild mitral annular calcification.  10. Mild mitral valve regurgitation.  11. Mild tricuspid regurgitation.

## 2021-06-04 NOTE — CHART NOTE - NSCHARTNOTEFT_GEN_A_CORE
79 y/o female with PMHx significant for Paroxysmal Afib (on ASA, DOAC unaffordable, previously on Eliquis), HTN, diverticulitis presents to the ER with a two month history of chest pain sent in by her Cardiologist after office visit 6/2. Patient states that she started to experience a burning sensation to mid chest area in April, which initially presents as a burning sensation on exertion then becomes more of a painful sensation with prolonged exertion, with intensity reaching ~ 6/10, pain resolved with resting for ~ 5 minutes, non radiating, without accompanying symptoms. Patient denies SOB, palpitations, dizziness, PND, N/V, leg edema. Patient states that she initially attributed her symptoms to reflux, however symptoms were not alleviated with treatment for GERD. In ER patient is asymptomatic, EKG done revealing NSR, no ischemic changes noted, on telemetry sinus edward cardia with rate 58/min. Initial troponin negative, CXR without acute pathology. Patient underwent unsuccessful left heart catheterization via RRA yesterday with Dr. Lackey 2/2 multiple spasms. She was admitted for overnight monitoring and plan for PCI to LAD / Diagonal today. Was started on Plavix and Aspirin. Now scheduled for LHC with Dr. Lackey this am. Now s/p LHC via RFA which required 1 NOBLE to proximal LAD; pre and post procedure balloon dilation.     Post procedure in holding, pt with marked hypotension, c/o nausea and dizziness without LOC. Post procedure BP  became hypotensive to SBP 68, HR 50's.  Found with right groin hematoma, manual compression applied > 30 minutes with good response. Site soft, no evidence of hematoma. Plan below reviewed with Dr. Lackey.       - Neosynphrine 6 mcg IVP x1  - Atropine 1 mg IVP x2   - Protamine 20 mg x1 IVP by NP   -  ml bolus X1  - Stat labs:  starting hb 11.0 --> repeat 10.9  - CT Abd/Pelv without contrast ordered (pending)  - PRBC x1 ordered on hold  - Phenylephrine 6 mcg x1 IVP; Pheny gtt ordered if needed  - right groin site wtihout hematoma   - patient with RLE palpable pulses   - will take pt to CT scan as soon as CT suite is available   - plan discussed with Dr. Lackey   - patient's son Milton updated on patient's condition 81 y/o female with PMHx significant for Paroxysmal Afib (on ASA, DOAC unaffordable, previously on Eliquis), HTN, diverticulitis presents to the ER with a two month history of chest pain sent in by her Cardiologist after office visit 6/2. Patient states that she started to experience a burning sensation to mid chest area in April, which initially presents as a burning sensation on exertion then becomes more of a painful sensation with prolonged exertion, with intensity reaching ~ 6/10, pain resolved with resting for ~ 5 minutes, non radiating, without accompanying symptoms. Patient denies SOB, palpitations, dizziness, PND, N/V, leg edema. Patient states that she initially attributed her symptoms to reflux, however symptoms were not alleviated with treatment for GERD. In ER patient is asymptomatic, EKG done revealing NSR, no ischemic changes noted, on telemetry sinus edward cardia with rate 58/min. Initial troponin negative, CXR without acute pathology. Patient underwent unsuccessful left heart catheterization via RRA yesterday with Dr. Lackey 2/2 multiple spasms. She was admitted for overnight monitoring and plan for PCI to LAD / Diagonal today. Was started on Plavix and Aspirin. Now scheduled for LHC with Dr. Lackey this am. Now s/p LHC via RFA which required 1 NOBLE to proximal LAD; pre and post procedure balloon dilation.     Post procedure in holding, pt with marked hypotension, c/o nausea and dizziness without LOC. Post procedure BP  became hypotensive to SBP 68, HR 50's.  Found with right groin hematoma, manual compression applied > 30 minutes with good response. Site soft, no evidence of hematoma. Plan below reviewed with Dr. Lackey.       - Neosynphrine 6 mcg IVP x1  - Atropine 1 mg IVP x2   - Protamine 20 mg x1 IVP by NP   -  ml bolus X1  - Stat labs:  starting hb 11.0 --> repeat 10.9  - CT Abd/Pelv without contrast ordered (pending)  - PRBC x1 ordered on hold (NOT GIVEN) discontinued   - Phenylephrine 6 mcg x1 IVP; Pheny gtt ordered if needed  - right groin site wtihout hematoma   - patient with RLE palpable pulses   - will take pt to CT scan as soon as CT suite is available   - plan discussed with Dr. Lackey   - patient's son Mliton updated on patient's condition

## 2021-06-04 NOTE — PROGRESS NOTE ADULT - SUBJECTIVE AND OBJECTIVE BOX
Department of Cardiology                                         [facility]/[place] [address] [facility]                                 Telephone: [phone]. Fax:[phone]                                         Pre Procedure Cardiac Cath NP Note      Narrative:      81 y/o female with PMHx significant for Paroxysmal Afib (on ASA, DOAC unaffordable, previously on Eliquis), HTN, diverticulitis presents to the ER with a two month history of chest pain sent in by her Cardiologist after office visit today. Patient states that she started to experience a burning sensation to mid chest area in April, which initially presents as a burning sensation on exertion then becomes more of a painful sensation with prolonged exertion, with intensity reaching ~ 6/10, pain resolved with resting for ~ 5 minutes, non radiating, without accompanying symptoms. Patient denies SOB, palpitations, dizziness, PND, N/V, leg edema. Patient states that she initially attributed her symptoms to reflux, however symptoms were not alleviated with treatment for GERD. In ER patient is asymptomatic, EKG done revealing NSR, no ischemic changes noted, on telemetry sinus edward cardia with rate 58/min. Initial troponin negative, CXR without acute pathology.     Patient underwent unsuccessful left heart catheterization via RRA yesterday with Dr. Lackey 2/2 multiple spasms. She was admitted for overnight monitoring and plan for PCI to LAD / Diagonal today. Was started on Plavix and Aspirin. Now scheduled for LHC with Dr. Lackey this am.       ASA:   2  Mallampati:   2  Bleeding Risk:  3.9%  Creatinine:  0.77   GFR:72  	    MEDICATIONS:  amLODIPine   Tablet 5 milliGRAM(s) Oral daily  lisinopril 10 milliGRAM(s) Oral daily  metoprolol succinate ER 25 milliGRAM(s) Oral daily  acetaminophen   Tablet .. 650 milliGRAM(s) Oral every 6 hours PRN  ondansetron Injectable 4 milliGRAM(s) IV Push every 6 hours PRN  atorvastatin 40 milliGRAM(s) Oral at bedtime  aspirin  chewable 81 milliGRAM(s) Oral daily  clopidogrel Tablet 75 milliGRAM(s) Oral daily      11.0   6.42  )-----------( 282      ( 04 Jun 2021 08:11 )             33.6     06-04    138  |  107  |  20.9<H>  ----------------------------<  90  4.2   |  21.0<L>  |  0.77    Ca    9.0      04 Jun 2021 08:11  Phos  4.3     06-03  Mg     2.0     06-03    TPro  6.3<L>  /  Alb  3.7  /  TBili  0.3<L>  /  DBili  x   /  AST  15  /  ALT  12  /  AlkPhos  75  06-03      PHYSICAL EXAM:    T(C): 36.8 (06-03-21 @ 14:05), Max: 36.9 (06-03-21 @ 11:28)  HR: 59 (06-04-21 @ 07:45) (52 - 64)  BP: 136/56 (06-04-21 @ 07:45) (107/45 - 172/61)  RR: 18 (06-04-21 @ 07:45) (15 - 20)  SpO2: 100% (06-04-21 @ 07:45) (97% - 100%)    I&O's Summary    Constitutional: A & O x 3  HEENT:   Normal oral mucosa, PERRL, EOMI	  Cardiovascular: Normal S1 S2, No JVD, No murmurs, No edema  Respiratory: Lungs clear to auscultation	  Gastrointestinal:  Soft, Non-tender, + BS	  Skin: No rashes, No ecchymoses, No cyanosis  Neurologic: Non-focal  Extremities: Normal range of motion, No clubbing, cyanosis or edema  Vascular: Peripheral pulses palpable 2+ bilaterally    TELEMETRY: 	NSR   ECG:    < from: 12 Lead ECG (06.03.21 @ 07:35) >  Sinus bradycardia    Confirmed by QUITA REID ([id number]) on 6/3/2021 11:20:47 AM    < end of copied text >  	    RADIOLOGY:   DIAGNOSTIC TESTING:  [ X] Echocardiogram:  < from: TTE Echo Complete w/o Contrast w/ Doppler (06.02.21 @ 19:06) >  Summary:   1. Normal left ventricular internal cavity size.   2. Normal global left ventricular systolic function.   3. Left ventricular ejection fraction, by visual estimation, is 60 to 65%.   4. Mildly enlarged right ventricle with normal systolic function.   5. The right atrium is normal in size.   6. The left atrium is normal in size.   7. Sclerotic aortic valve with normal opening.   8. Thickening of the anterior and posterior mitral valve leaflets.   9. Mild mitral annular calcification.  10. Mild mitral valve regurgitation.  11. Mild tricuspid regurgitation.    MD Francy Electronically signed on 6/3/2021 at 12:38:44 PM    *** Final ***      ROBBY MAY   This document has been electronically signed. Jun 2 2021  7:06PM    < end of copied text >        ASSESSMENT:    81 y/o female with medical history of pAF (was on Eliquis but currently, no AC d/t cost), HTN, diverticulitis, who presents to [facility]ED for chest pain. Pt states that she has been experiencing a chest burning sensation since April. Chest discomfort described as burning that changes to pain with prolonged exertion, non-radiating, lasting 5 minutes or until she rests. Pt states rest relieves symptoms. Pt saw PCP who recommended she start Pepcid which did not relieve symptoms. Pt saw cardiologist Dr. Monae in [facility] office today and she was advised to seek emergent medical care to evaluate symptoms. In ED, ECG- NSR HR @ 62, XRAY-No focal consolidation or pleural effusion, Trop#1-negative. Pt denies feeling chest discomfort at time of physical assessment.  Now s/p LHC via RRA by Dr. Lackey: significant LAD/Diag bifurcation stenosis (prelim results), now scheduled for PCI to LAD/Diagonal today.       -Patient seen and examined  -Labs and EKG reviewed, EKG without acute ischemic changes    -LHC today to LAD/Diag Bifurcation stenosis  -was given Plavix 600 mg x1 6/3/21 and  -continues on Plavix 75 mg and Aspirin 81 mg daily   -Pre-procedure teaching completed with patient, questions answered   -Informed consent to be obtained by attending   -Pt received Asprin/ Plavix prior to cardiac cath   -pt instructed IF STENT PLACED WILL REQUIRE TO STAY OVERNIGHT FOR MONITORING AND DISCHARGED IN THE AM    -cardiac rehab info provided if stent is placed recommended to start if needed post PCI                                                         Department of Cardiology                                         [facility]/[place] [address] [address] [facility]                                 Telephone: [phone]. Fax:[phone]                                         Pre Procedure Cardiac Cath NP Note      Narrative:      79 y/o female with PMHx significant for Paroxysmal Afib (on ASA, DOAC unaffordable, previously on Eliquis), HTN, diverticulitis presents to the ER with a two month history of chest pain sent in by her Cardiologist after office visit 6/2. Patient states that she started to experience a burning sensation to mid chest area in April, which initially presents as a burning sensation on exertion then becomes more of a painful sensation with prolonged exertion, with intensity reaching ~ 6/10, pain resolved with resting for ~ 5 minutes, non radiating, without accompanying symptoms. Patient denies SOB, palpitations, dizziness, PND, N/V, leg edema. Patient states that she initially attributed her symptoms to reflux, however symptoms were not alleviated with treatment for GERD. In ER patient is asymptomatic, EKG done revealing NSR, no ischemic changes noted, on telemetry sinus edward cardia with rate 58/min. Initial troponin negative, CXR without acute pathology.     Patient underwent unsuccessful left heart catheterization via RRA yesterday 6/3 with Dr. Lackey 2/2 multiple spasms. She was admitted for overnight monitoring and plan for PCI to LAD / Diagonal today. Was started on Plavix and Aspirin. Now scheduled for LHC with Dr. Lackey this am.       ASA:   2  Mallampati:   2  Bleeding Risk:  3.9%  Creatinine:  0.77   GFR:72  	    MEDICATIONS:  amLODIPine   Tablet 5 milliGRAM(s) Oral daily  lisinopril 10 milliGRAM(s) Oral daily  metoprolol succinate ER 25 milliGRAM(s) Oral daily  acetaminophen   Tablet .. 650 milliGRAM(s) Oral every 6 hours PRN  ondansetron Injectable 4 milliGRAM(s) IV Push every 6 hours PRN  atorvastatin 40 milliGRAM(s) Oral at bedtime  aspirin  chewable 81 milliGRAM(s) Oral daily  clopidogrel Tablet 75 milliGRAM(s) Oral daily      11.0   6.42  )-----------( 282      ( 04 Jun 2021 08:11 )             33.6     06-04    138  |  107  |  20.9<H>  ----------------------------<  90  4.2   |  21.0<L>  |  0.77    Ca    9.0      04 Jun 2021 08:11  Phos  4.3     06-03  Mg     2.0     06-03    TPro  6.3<L>  /  Alb  3.7  /  TBili  0.3<L>  /  DBili  x   /  AST  15  /  ALT  12  /  AlkPhos  75  06-03      PHYSICAL EXAM:    T(C): 36.8 (06-03-21 @ 14:05), Max: 36.9 (06-03-21 @ 11:28)  HR: 59 (06-04-21 @ 07:45) (52 - 64)  BP: 136/56 (06-04-21 @ 07:45) (107/45 - 172/61)  RR: 18 (06-04-21 @ 07:45) (15 - 20)  SpO2: 100% (06-04-21 @ 07:45) (97% - 100%)    I&O's Summary    Constitutional: A & O x 3  HEENT:   Normal oral mucosa, PERRL, EOMI	  Cardiovascular: Normal S1 S2, No JVD, No murmurs, No edema  Respiratory: Lungs clear to auscultation	  Gastrointestinal:  Soft, Non-tender, + BS	  Skin: No rashes, No ecchymoses, No cyanosis  Neurologic: Non-focal  Extremities: Normal range of motion, No clubbing, cyanosis or edema  Vascular: Peripheral pulses palpable 2+ bilaterally    TELEMETRY: 	NSR   ECG:    < from: 12 Lead ECG (06.03.21 @ 07:35) >  Sinus bradycardia    Confirmed by QUITA REID ([id number]) on 6/3/2021 11:20:47 AM    < end of copied text >  	    RADIOLOGY:   DIAGNOSTIC TESTING:  [ X] Echocardiogram:  < from: TTE Echo Complete w/o Contrast w/ Doppler (06.02.21 @ 19:06) >  Summary:   1. Normal left ventricular internal cavity size.   2. Normal global left ventricular systolic function.   3. Left ventricular ejection fraction, by visual estimation, is 60 to 65%.   4. Mildly enlarged right ventricle with normal systolic function.   5. The right atrium is normal in size.   6. The left atrium is normal in size.   7. Sclerotic aortic valve with normal opening.   8. Thickening of the anterior and posterior mitral valve leaflets.   9. Mild mitral annular calcification.  10. Mild mitral valve regurgitation.  11. Mild tricuspid regurgitation.    MD Francy Electronically signed on 6/3/2021 at 12:38:44 PM    *** Final ***      ROBBY MAY   This document has been electronically signed. Jun 2 2021  7:06PM    < end of copied text >        ASSESSMENT:    79 y/o female with medical history of pAF (was on Eliquis but currently, no AC d/t cost), HTN, diverticulitis, who presents to [facility]-ED for chest pain. Pt states that she has been experiencing a chest burning sensation since April. Chest discomfort described as burning that changes to pain with prolonged exertion, non-radiating, lasting 5 minutes or until she rests. Pt states rest relieves symptoms. Pt saw PCP who recommended she start Pepcid which did not relieve symptoms. Pt saw cardiologist Dr. Monae in [facility] office 6/2 and she was advised to seek emergent medical care to evaluate symptoms. In ED, ECG- NSR HR @ 62, XRAY-No focal consolidation or pleural effusion, Trop#1-negative. Pt denies feeling chest discomfort at time of physical assessment.  Now s/p LHC via RRA by Dr. Lackey: significant LAD/Diag bifurcation stenosis (prelim results), now scheduled for PCI to LAD/Diagonal today.       -Patient seen and examined  -Labs and EKG reviewed, EKG without acute ischemic changes    -LHC today to LAD/Diag Bifurcation stenosis  -was given Plavix 600 mg x1 6/3/21 and  -continues on Plavix 75 mg and Aspirin 81 mg daily   -Pre-procedure teaching completed with patient, questions answered   -Informed consent to be obtained by attending   -Pt received Asprin/ Plavix prior to cardiac cath   -pt instructed IF STENT PLACED WILL REQUIRE TO STAY OVERNIGHT FOR MONITORING AND DISCHARGED IN THE AM    -cardiac rehab info provided if stent is placed recommended to start if needed post PCI

## 2021-06-04 NOTE — PROGRESS NOTE ADULT - SUBJECTIVE AND OBJECTIVE BOX
Department of Cardiology                                         [facility]/[place] [address] [address] [facility]                                 Telephone: [phone]. Fax:[phone]                                         Pre Procedure Cardiac Cath NP Note      Narrative:      79 y/o female with PMHx significant for Paroxysmal Afib (on ASA, DOAC unaffordable, previously on Eliquis), HTN, diverticulitis presents to the ER with a two month history of chest pain sent in by her Cardiologist after office visit 6/2. Patient states that she started to experience a burning sensation to mid chest area in April, which initially presents as a burning sensation on exertion then becomes more of a painful sensation with prolonged exertion, with intensity reaching ~ 6/10, pain resolved with resting for ~ 5 minutes, non radiating, without accompanying symptoms. Patient denies SOB, palpitations, dizziness, PND, N/V, leg edema. Patient states that she initially attributed her symptoms to reflux, however symptoms were not alleviated with treatment for GERD. In ER patient is asymptomatic, EKG done revealing NSR, no ischemic changes noted, on telemetry sinus edward cardia with rate 58/min. Initial troponin negative, CXR without acute pathology. Patient underwent unsuccessful left heart catheterization via RRA yesterday with Dr. Lackey 2/2 multiple spasms. She was admitted for overnight monitoring and plan for PCI to LAD / Diagonal today. Was started on Plavix and Aspirin. Now scheduled for LHC with Dr. Lackey this am.       s/p LHC via RFA which required 1 NOBLE to proximal LAD; pre and post procedure balloon dilation   Heparin 7000 units  Versed 1 mg and Fentanyl 25 mcg x2  hemostasis achieved with RFA Angioseal     	    MEDICATIONS:  amLODIPine   Tablet 5 milliGRAM(s) Oral daily  lisinopril 10 milliGRAM(s) Oral daily  metoprolol succinate ER 25 milliGRAM(s) Oral daily  acetaminophen   Tablet .. 650 milliGRAM(s) Oral every 6 hours PRN  ondansetron Injectable 4 milliGRAM(s) IV Push every 6 hours PRN  atorvastatin 40 milliGRAM(s) Oral at bedtime  aspirin  chewable 81 milliGRAM(s) Oral daily  clopidogrel Tablet 75 milliGRAM(s) Oral daily      11.0   6.42  )-----------( 282      ( 04 Jun 2021 08:11 )             33.6     06-04    138  |  107  |  20.9<H>  ----------------------------<  90  4.2   |  21.0<L>  |  0.77    Ca    9.0      04 Jun 2021 08:11  Phos  4.3     06-03  Mg     2.0     06-03    TPro  6.3<L>  /  Alb  3.7  /  TBili  0.3<L>  /  DBili  x   /  AST  15  /  ALT  12  /  AlkPhos  75  06-03      PHYSICAL EXAM:    T(C): 36.8 (06-03-21 @ 14:05), Max: 36.9 (06-03-21 @ 11:28)  HR: 59 (06-04-21 @ 07:45) (52 - 64)  BP: 136/56 (06-04-21 @ 07:45) (107/45 - 172/61)  RR: 18 (06-04-21 @ 07:45) (15 - 20)  SpO2: 100% (06-04-21 @ 07:45) (97% - 100%)    I&O's Summary    Constitutional: A & O x 3  HEENT:   Normal oral mucosa, PERRL, EOMI	  Cardiovascular: Normal S1 S2, No JVD, No murmurs, No edema  Respiratory: Lungs clear to auscultation	  Gastrointestinal:  Soft, Non-tender, + BS	  Skin: No rashes, No ecchymoses, No cyanosis  Neurologic: Non-focal  Right groin: stable, no evidence of bleeding or hematoma; Angioseal closure   Extremities: Normal range of motion, No clubbing, cyanosis or edema  Vascular: Peripheral pulses palpable 2+ bilaterally    TELEMETRY: 	NSR   ECG:    < from: 12 Lead ECG (06.03.21 @ 07:35) >  Sinus bradycardia    Confirmed by QUITA REID ([id number]) on 6/3/2021 11:20:47 AM    < end of copied text >  	    RADIOLOGY:   DIAGNOSTIC TESTING:  [ X] Echocardiogram:  < from: TTE Echo Complete w/o Contrast w/ Doppler (06.02.21 @ 19:06) >  Summary:   1. Normal left ventricular internal cavity size.   2. Normal global left ventricular systolic function.   3. Left ventricular ejection fraction, by visual estimation, is 60 to 65%.   4. Mildly enlarged right ventricle with normal systolic function.   5. The right atrium is normal in size.   6. The left atrium is normal in size.   7. Sclerotic aortic valve with normal opening.   8. Thickening of the anterior and posterior mitral valve leaflets.   9. Mild mitral annular calcification.  10. Mild mitral valve regurgitation.  11. Mild tricuspid regurgitation.    MD Francy Electronically signed on 6/3/2021 at 12:38:44 PM    *** Final ***      ROBBY MAY   This document has been electronically signed. Jun 2 2021  7:06PM    < end of copied text >        ASSESSMENT:    79 y/o female with medical history of pAF (was on Eliquis but currently, no AC d/t cost), HTN, diverticulitis, who presents to [facility]ED for chest pain. Pt states that she has been experiencing a chest burning sensation since April. Chest discomfort described as burning that changes to pain with prolonged exertion, non-radiating, lasting 5 minutes or until she rests. Pt states rest relieves symptoms. Pt saw PCP who recommended she start Pepcid which did not relieve symptoms. Pt saw cardiologist Dr. Monae in [facility] office and she was advised to seek emergent medical care to evaluate symptoms. In ED, ECG- NSR HR @ 62, XRAY-No focal consolidation or pleural effusion, Trop#1-negative. Pt denies feeling chest discomfort at time of physical assessment.  Now s/p LHC via RRA by Dr. Lackey: significant LAD/Diag bifurcation stenosis (prelim results), now s/p LHC which required pre and post procedure balloon dilation and 1 NOBLE to proximal LAD.         -s/p LHC 1 NOBLE to proximal LAD, Diag not intervened on today   -post cardiac cath orders  -R groin site  dressing CDI no bleeding no hematoma noted, site soft non tender, positive pedal pulses bilat  -hemostasis achieved with Angioseal closure   -was given Plavix 600 mg x1 6/3/21   -continues on Plavix 75 mg and Aspirin 81 mg daily   -continue bb/ace/statin  -patient to follow with Dr. Monae on discharge   -cardiac rehab info provided if stent is placed recommended to start if needed post PCI                                                         Department of Cardiology                                         [facility]/[place] [address] [address] [facility] [place]                                 Telephone: [phone]. Fax:[phone]                                         Pre Procedure Cardiac Cath NP Note      Narrative:      79 y/o female with PMHx significant for Paroxysmal Afib (on ASA, DOAC unaffordable, previously on Eliquis), HTN, diverticulitis presents to the ER with a two month history of chest pain sent in by her Cardiologist after office visit 6/2. Patient states that she started to experience a burning sensation to mid chest area in April, which initially presents as a burning sensation on exertion then becomes more of a painful sensation with prolonged exertion, with intensity reaching ~ 6/10, pain resolved with resting for ~ 5 minutes, non radiating, without accompanying symptoms. Patient denies SOB, palpitations, dizziness, PND, N/V, leg edema. Patient states that she initially attributed her symptoms to reflux, however symptoms were not alleviated with treatment for GERD. In ER patient is asymptomatic, EKG done revealing NSR, no ischemic changes noted, on telemetry sinus edward cardia with rate 58/min. Initial troponin negative, CXR without acute pathology. Patient underwent unsuccessful left heart catheterization via RRA yesterday with Dr. Lackey 2/2 multiple spasms. She was admitted for overnight monitoring and plan for PCI to LAD / Diagonal today. Was started on Plavix and Aspirin. Now scheduled for LHC with Dr. Lackey this am.       s/p LHC via RFA which required 1 NOBLE to proximal LAD; pre and post procedure balloon dilation   Heparin 7000 units  Versed 1 mg and Fentanyl 25 mcg x2  hemostasis achieved with RFA Angioseal     	    MEDICATIONS:  amLODIPine   Tablet 5 milliGRAM(s) Oral daily  lisinopril 10 milliGRAM(s) Oral daily  metoprolol succinate ER 25 milliGRAM(s) Oral daily  acetaminophen   Tablet .. 650 milliGRAM(s) Oral every 6 hours PRN  ondansetron Injectable 4 milliGRAM(s) IV Push every 6 hours PRN  atorvastatin 40 milliGRAM(s) Oral at bedtime  aspirin  chewable 81 milliGRAM(s) Oral daily  clopidogrel Tablet 75 milliGRAM(s) Oral daily      11.0   6.42  )-----------( 282      ( 04 Jun 2021 08:11 )             33.6     06-04    138  |  107  |  20.9<H>  ----------------------------<  90  4.2   |  21.0<L>  |  0.77    Ca    9.0      04 Jun 2021 08:11  Phos  4.3     06-03  Mg     2.0     06-03    TPro  6.3<L>  /  Alb  3.7  /  TBili  0.3<L>  /  DBili  x   /  AST  15  /  ALT  12  /  AlkPhos  75  06-03      PHYSICAL EXAM:    T(C): 36.8 (06-03-21 @ 14:05), Max: 36.9 (06-03-21 @ 11:28)  HR: 59 (06-04-21 @ 07:45) (52 - 64)  BP: 136/56 (06-04-21 @ 07:45) (107/45 - 172/61)  RR: 18 (06-04-21 @ 07:45) (15 - 20)  SpO2: 100% (06-04-21 @ 07:45) (97% - 100%)    I&O's Summary    Constitutional: A & O x 3  HEENT:   Normal oral mucosa, PERRL, EOMI	  Cardiovascular: Normal S1 S2, No JVD, No murmurs, No edema  Respiratory: Lungs clear to auscultation	  Gastrointestinal:  Soft, Non-tender, + BS	  Skin: No rashes, No ecchymoses, No cyanosis  Neurologic: Non-focal  Right groin: stable, no evidence of bleeding or hematoma; Angioseal closure   Extremities: Normal range of motion, No clubbing, cyanosis or edema  Vascular: Peripheral pulses palpable 2+ bilaterally    TELEMETRY: 	NSR   ECG:    < from: 12 Lead ECG (06.03.21 @ 07:35) >  Sinus bradycardia    Confirmed by QUITA REID ([id number]) on 6/3/2021 11:20:47 AM    < end of copied text >  	    RADIOLOGY:   DIAGNOSTIC TESTING:  [ X] Echocardiogram:  < from: TTE Echo Complete w/o Contrast w/ Doppler (06.02.21 @ 19:06) >  Summary:   1. Normal left ventricular internal cavity size.   2. Normal global left ventricular systolic function.   3. Left ventricular ejection fraction, by visual estimation, is 60 to 65%.   4. Mildly enlarged right ventricle with normal systolic function.   5. The right atrium is normal in size.   6. The left atrium is normal in size.   7. Sclerotic aortic valve with normal opening.   8. Thickening of the anterior and posterior mitral valve leaflets.   9. Mild mitral annular calcification.  10. Mild mitral valve regurgitation.  11. Mild tricuspid regurgitation.    MD Francy Electronically signed on 6/3/2021 at 12:38:44 PM    *** Final ***      ROBBY MAY   This document has been electronically signed. Jun 2 2021  7:06PM    < end of copied text >        ASSESSMENT:    79 y/o female with medical history of pAF (was on Eliquis but currently, no AC d/t cost), HTN, diverticulitis, who presents to [facility]-ED for chest pain. Pt states that she has been experiencing a chest burning sensation since April. Chest discomfort described as burning that changes to pain with prolonged exertion, non-radiating, lasting 5 minutes or until she rests. Pt states rest relieves symptoms. Pt saw PCP who recommended she start Pepcid which did not relieve symptoms. Pt saw cardiologist Dr. Monae in [facility] office and she was advised to seek emergent medical care to evaluate symptoms. In ED, ECG- NSR HR @ 62, XRAY-No focal consolidation or pleural effusion, Trop#1-negative. Pt denies feeling chest discomfort at time of physical assessment.  Now s/p LHC via RRA by Dr. Lackey: significant LAD/Diag bifurcation stenosis (prelim results), now s/p LHC which required pre and post procedure balloon dilation and 1 NOBLE to proximal LAD.         -s/p LHC 1 NOBLE to proximal LAD, Diag not intervened on today   -post cardiac cath orders  -R groin site  dressing CDI no bleeding no hematoma noted, site soft non tender, positive pedal pulses bilat  -hemostasis achieved with Angioseal closure   -was given Plavix 600 mg x1 6/3/21   -continues on Plavix 75 mg and Aspirin 81 mg daily   -continue bb/ace/statin  -patient to follow with Dr. Monae on discharge   -cardiac rehab info provided if stent is placed recommended to start if needed post PCI     ADDENDUM:    Post procedure in holding, pt with marked hypotension, c/o nausea and dizziness without LOC. Post procedure BP  became hypotensive to SBP 68, HR 50's.  Found with right groin hematoma, manual compression applied > 30 minutes with good response. Site soft, no evidence of hematoma. Patient's BP has remained stable last 1.5 hrs  - 140's. Repeat cbc showed stable hb 10.9. CT Abd/Pelv as noted below. Plan discussed with Dr. Lackey. Plan for MICU bed.     PERITONEUM: There is a moderate hyperattenuating collection extending along the right pelvic sidewall to the right posterior pararenalspace, right retroperitoneum, right lateral conal fascia, and right groin, compatible with hematoma. The hematoma extends along the course of the right ureter to the right renal pelvis. There is mild mass effect of the pelvic hematoma upon the bladder.  VESSELS: Within normal limits.  RETROPERITONEUM/LYMPH NODES: No lymphadenopathy.  ABDOMINAL WALL: Within normal limits.  BONES: Moderate anterolisthesis of L4 on L5.    IMPRESSION: Moderate right pelvic sidewall and retroperitoneal hemorrhage.Results discussed with KAMERON Miller in the cardiac catheterization lab at time of interpretation.    ANIVAL SIDHU M.D., ATTENDING RADIOLOGIST  This document has been electronically signed. Jun 4 2021  2:12PM    < end of copied text >.

## 2021-06-04 NOTE — CHART NOTE - NSCHARTNOTEFT_GEN_A_CORE
81 y/o female with PMHx significant for Paroxysmal Afib (on ASA, DOAC unaffordable, previously on Eliquis), HTN, diverticulitis presents to the ER with a two month history of chest pain sent in by her Cardiologist after office visit 6/2. Patient states that she started to experience a burning sensation to mid chest area in April, which initially presents as a burning sensation on exertion then becomes more of a painful sensation with prolonged exertion, with intensity reaching ~ 6/10, pain resolved with resting for ~ 5 minutes, non radiating, without accompanying symptoms. Patient denies SOB, palpitations, dizziness, PND, N/V, leg edema. Patient states that she initially attributed her symptoms to reflux, however symptoms were not alleviated with treatment for GERD. In ER patient is asymptomatic, EKG done revealing NSR, no ischemic changes noted, on telemetry sinus edward cardia with rate 58/min. Initial troponin negative, CXR without acute pathology. Patient underwent unsuccessful left heart catheterization via RRA yesterday with Dr. Lackey 2/2 multiple spasms. She was admitted for overnight monitoring and plan for PCI to LAD / Diagonal today. Was started on Plavix and Aspirin. Now scheduled for LHC with Dr. Lackey this am. Now s/p LHC via RFA which required 1 NOBLE to proximal LAD; pre and post procedure balloon dilation.     Post procedure in holding, pt with marked hypotension, c/o nausea and dizziness without LOC. Post procedure BP  became hypotensive to SBP 68, HR 50's.  Found with right groin hematoma, manual compression applied > 30 minutes with good response. Site soft, no evidence of hematoma. Patient's BP has remained stable last 1.5 hrs  - 140's. Repeat cbc showed stable hb 10.9. CT Abd/Pelv as noted below. Plan discussed with Dr. Lackey. Plan for MICU bed.         < from: CT Abdomen and Pelvis No Cont (06.04.21 @ 14:03) >    FINDINGS:  LOWER CHEST: Within normal limits.    LIVER: Within normal limits.  BILE DUCTS: Normal caliber.  GALLBLADDER: Within normal limits.  SPLEEN: Within normal limits.  PANCREAS: Within normal limits.  ADRENALS: Within normal limits.  KIDNEYS/URETERS: Within normal limits. There is excreted contrast in the bilateral renal collecting systems and ureters extending to the bladder.    BLADDER: Distended with excreted contrast and smooth in contour..  REPRODUCTIVE ORGANS: Uterus and adnexa within normal limits.    BOWEL: There is diverticulosis of the sigmoid colon. Otherwise, small and large bowel loops are unremarkable with no evidence of obstruction or inflammatory stranding of the mesenteric fat. The appendix is unremarkable.  PERITONEUM: There is a moderate hyperattenuating collection extending along the right pelvic sidewall to the right posterior pararenalspace, right retroperitoneum, right lateral conal fascia, and right groin, compatible with hematoma. The hematoma extends along the course of the right ureter to the right renal pelvis. There is mild mass effect of the pelvic hematoma upon the bladder.  VESSELS: Within normal limits.  RETROPERITONEUM/LYMPH NODES: No lymphadenopathy.  ABDOMINAL WALL: Within normal limits.  BONES: Moderate anterolisthesis of L4 on L5.    IMPRESSION: Moderate right pelvic sidewall and retroperitoneal hemorrhage.Results discussed with KAMERON Miller in the cardiac catheterization lab at time of interpretation.    ANIVAL SIDHU M.D., ATTENDING RADIOLOGIST  This document has been electronically signed. Jun 4 2021  2:12PM    < end of copied text >

## 2021-06-04 NOTE — PROGRESS NOTE ADULT - SUBJECTIVE AND OBJECTIVE BOX
79 y/o female with PMHx significant for Paroxysmal Afib (on ASA, DOAC unaffordable, previously on Eliquis), HTN, diverticulitis presents to the ER with a two month history of chest pain sent in by her Cardiologist after office visit 6/2. Patient states that she started to experience a burning sensation to mid chest area in April, which initially presents as a burning sensation on exertion then becomes more of a painful sensation with prolonged exertion, with intensity reaching ~ 6/10, pain resolved with resting for ~ 5 minutes, non radiating, without accompanying symptoms. Patient denies SOB, palpitations, dizziness, PND, N/V, leg edema. Patient states that she initially attributed her symptoms to reflux, however symptoms were not alleviated with treatment for GERD. In ER patient is asymptomatic, EKG done revealing NSR, no ischemic changes noted, on telemetry sinus edward cardia with rate 58/min. Initial troponin negative, CXR without acute pathology. Patient underwent unsuccessful left heart catheterization via RRA yesterday with Dr. Lackey 2/2 multiple spasms. She was admitted for overnight monitoring and plan for PCI to LAD / Diagonal today.    Pt had R femoral cath with PCI to pLAD today.  Post procedure she had episode of hypotension and had urgent CT abd which demonstrated right retroperitoneal bleed.  Pt responded to IVF and will be upgraded to ICU for observation.  Currently she is comfortable in bed.  No CP although she states she's "not back to normal yet."  No SOB.    Patient is a 80y old  Female who presents with a chief complaint of Chest pain (04 Jun 2021 11:33)      PAST MEDICAL & SURGICAL HISTORY:  Diverticulitis    Abscess  abdominal abcess    Essential hypertension    Paroxysmal atrial fibrillation    No significant past surgical history        Review of Systems:  CONSTITUTIONAL: No fever, chills, or fatigue  EYES: No eye pain, visual disturbances, or discharge  ENMT:  No difficulty hearing, tinnitus, vertigo; No sinus or throat pain  NECK: No pain or stiffness  RESPIRATORY: No cough, wheezing, chills or hemoptysis; No shortness of breath  CARDIOVASCULAR: No chest pain, palpitations, dizziness, c/o tenderness to right groin  GASTROINTESTINAL: No abdominal or epigastric pain. No nausea, vomiting, or hematemesis; No diarrhea or constipation. No melena or hematochezia.  GENITOURINARY: No dysuria, frequency, hematuria, or incontinence  NEUROLOGICAL: No headaches, memory loss, loss of strength, numbness, or tremors  SKIN: No itching, burning, rashes, or lesions   MUSCULOSKELETAL: No joint pain or swelling; No muscle, back, or extremity pain  PSYCHIATRIC: No depression, anxiety, mood swings, or difficulty sleeping      Medications:    amLODIPine   Tablet 5 milliGRAM(s) Oral daily  lisinopril 10 milliGRAM(s) Oral daily  metoprolol succinate ER 25 milliGRAM(s) Oral daily      acetaminophen   Tablet .. 650 milliGRAM(s) Oral every 6 hours PRN  ondansetron Injectable 4 milliGRAM(s) IV Push every 6 hours PRN      aspirin  chewable 81 milliGRAM(s) Oral daily  clopidogrel Tablet 75 milliGRAM(s) Oral daily        atorvastatin 40 milliGRAM(s) Oral at bedtime                  ICU Vital Signs Last 24 Hrs  T(C): 36.8 (04 Jun 2021 16:00), Max: 36.8 (04 Jun 2021 16:00)  T(F): 98.2 (04 Jun 2021 16:00), Max: 98.2 (04 Jun 2021 16:00)  HR: 60 (04 Jun 2021 16:00) (52 - 105)  BP: 131/54 (04 Jun 2021 16:00) (67/38 - 188/74)  BP(mean): 78 (04 Jun 2021 16:00) (72 - 78)  ABP: --  ABP(mean): --  RR: 15 (04 Jun 2021 16:00) (15 - 24)  SpO2: 100% (04 Jun 2021 16:00) (97% - 100%)      LABS:                        10.9   10.67 )-----------( 218      ( 04 Jun 2021 15:34 )             33.1     06-04    137  |  107  |  18.6  ----------------------------<  111<H>  4.3   |  20.0<L>  |  0.70    Ca    8.8      04 Jun 2021 12:37  Phos  4.3     06-03  Mg     2.0     06-03    TPro  6.3<L>  /  Alb  3.7  /  TBili  0.3<L>  /  DBili  x   /  AST  15  /  ALT  12  /  AlkPhos  75  06-03      CARDIAC MARKERS ( 02 Jun 2021 23:04 )  x     / <0.01 ng/mL / x     / x     / x          CAPILLARY BLOOD GLUCOSE            CULTURES:      Physical Examination:    General: No acute distress.  Alert, oriented, interactive, nonfocal    HEENT: Pupils equal, reactive to light.  Symmetric.    PULM: Clear to auscultation bilaterally, no significant sputum production    CVS: Regular rate and rhythm, no murmurs, rubs, or gallops    ABD: Soft, mild tenderness/diffuse, right groin angioseal, no edema, no ecchymosis, normoactive bowel sounds, no masses    EXT: No edema, nontender, distal pulses intact    SKIN: Warm and well perfused, no rashes noted.    RADIOLOGY: images reviewed    CRITICAL CARE TIME SPENT: ***

## 2021-06-05 LAB
ABO RH CONFIRMATION: SIGNIFICANT CHANGE UP
ANION GAP SERPL CALC-SCNC: 10 MMOL/L — SIGNIFICANT CHANGE UP (ref 5–17)
BLD GP AB SCN SERPL QL: SIGNIFICANT CHANGE UP
BUN SERPL-MCNC: 18.2 MG/DL — SIGNIFICANT CHANGE UP (ref 8–20)
CALCIUM SERPL-MCNC: 8.3 MG/DL — LOW (ref 8.6–10.2)
CHLORIDE SERPL-SCNC: 107 MMOL/L — SIGNIFICANT CHANGE UP (ref 98–107)
CO2 SERPL-SCNC: 20 MMOL/L — LOW (ref 22–29)
CREAT SERPL-MCNC: 0.86 MG/DL — SIGNIFICANT CHANGE UP (ref 0.5–1.3)
GLUCOSE SERPL-MCNC: 96 MG/DL — SIGNIFICANT CHANGE UP (ref 70–99)
HCT VFR BLD CALC: 26.5 % — LOW (ref 34.5–45)
HCT VFR BLD CALC: 27.4 % — LOW (ref 34.5–45)
HGB BLD-MCNC: 8.7 G/DL — LOW (ref 11.5–15.5)
HGB BLD-MCNC: 9 G/DL — LOW (ref 11.5–15.5)
MCHC RBC-ENTMCNC: 30.2 PG — SIGNIFICANT CHANGE UP (ref 27–34)
MCHC RBC-ENTMCNC: 30.3 PG — SIGNIFICANT CHANGE UP (ref 27–34)
MCHC RBC-ENTMCNC: 32.8 GM/DL — SIGNIFICANT CHANGE UP (ref 32–36)
MCHC RBC-ENTMCNC: 32.8 GM/DL — SIGNIFICANT CHANGE UP (ref 32–36)
MCV RBC AUTO: 92 FL — SIGNIFICANT CHANGE UP (ref 80–100)
MCV RBC AUTO: 92.3 FL — SIGNIFICANT CHANGE UP (ref 80–100)
PLATELET # BLD AUTO: 241 K/UL — SIGNIFICANT CHANGE UP (ref 150–400)
PLATELET # BLD AUTO: 245 K/UL — SIGNIFICANT CHANGE UP (ref 150–400)
POTASSIUM SERPL-MCNC: 3.9 MMOL/L — SIGNIFICANT CHANGE UP (ref 3.5–5.3)
POTASSIUM SERPL-SCNC: 3.9 MMOL/L — SIGNIFICANT CHANGE UP (ref 3.5–5.3)
RBC # BLD: 2.88 M/UL — LOW (ref 3.8–5.2)
RBC # BLD: 2.97 M/UL — LOW (ref 3.8–5.2)
RBC # FLD: 12.8 % — SIGNIFICANT CHANGE UP (ref 10.3–14.5)
RBC # FLD: 12.9 % — SIGNIFICANT CHANGE UP (ref 10.3–14.5)
SODIUM SERPL-SCNC: 137 MMOL/L — SIGNIFICANT CHANGE UP (ref 135–145)
WBC # BLD: 8.4 K/UL — SIGNIFICANT CHANGE UP (ref 3.8–10.5)
WBC # BLD: 8.52 K/UL — SIGNIFICANT CHANGE UP (ref 3.8–10.5)
WBC # FLD AUTO: 8.4 K/UL — SIGNIFICANT CHANGE UP (ref 3.8–10.5)
WBC # FLD AUTO: 8.52 K/UL — SIGNIFICANT CHANGE UP (ref 3.8–10.5)

## 2021-06-05 PROCEDURE — 99233 SBSQ HOSP IP/OBS HIGH 50: CPT

## 2021-06-05 PROCEDURE — 99291 CRITICAL CARE FIRST HOUR: CPT

## 2021-06-05 RX ORDER — SODIUM CHLORIDE 9 MG/ML
1000 INJECTION, SOLUTION INTRAVENOUS ONCE
Refills: 0 | Status: COMPLETED | OUTPATIENT
Start: 2021-06-05 | End: 2021-06-05

## 2021-06-05 RX ADMIN — SODIUM CHLORIDE 1000 MILLILITER(S): 9 INJECTION, SOLUTION INTRAVENOUS at 03:35

## 2021-06-05 RX ADMIN — AMLODIPINE BESYLATE 5 MILLIGRAM(S): 2.5 TABLET ORAL at 06:07

## 2021-06-05 RX ADMIN — CLOPIDOGREL BISULFATE 75 MILLIGRAM(S): 75 TABLET, FILM COATED ORAL at 11:11

## 2021-06-05 RX ADMIN — Medication 81 MILLIGRAM(S): at 11:11

## 2021-06-05 RX ADMIN — ATORVASTATIN CALCIUM 40 MILLIGRAM(S): 80 TABLET, FILM COATED ORAL at 21:18

## 2021-06-05 RX ADMIN — Medication 25 MILLIGRAM(S): at 06:07

## 2021-06-05 RX ADMIN — LISINOPRIL 10 MILLIGRAM(S): 2.5 TABLET ORAL at 06:07

## 2021-06-05 NOTE — PROGRESS NOTE ADULT - SUBJECTIVE AND OBJECTIVE BOX
BRAVO JAME  ----------------------------------------  The patient was seen at bedside. Patient with retroperitoneal hemorrhage. Offers no complaints except for mild right groin pain. Denied chest pain or palpitations.    Vital Signs Last 24 Hrs  T(C): 36.8 (05 Jun 2021 08:01), Max: 36.8 (04 Jun 2021 16:00)  T(F): 98.2 (05 Jun 2021 08:01), Max: 98.2 (04 Jun 2021 16:00)  HR: 65 (05 Jun 2021 12:00) (56 - 105)  BP: 126/51 (05 Jun 2021 12:00) (94/40 - 155/67)  BP(mean): 72 (05 Jun 2021 12:00) (55 - 95)  RR: 22 (05 Jun 2021 12:00) (14 - 24)  SpO2: 100% (05 Jun 2021 12:00) (95% - 100%)    PHYSICAL EXAMINATION:  ----------------------------------------  General appearance: No acute distress, Awake, Alert  HEENT: Normocephalic, Atraumatic, Conjunctiva clear, EOMI  Neck: Supple, No JVD, No tenderness  Lungs: Breath sound equal bilaterally, No wheezes, No rales  Cardiovascular: S1S2, Regular rhythm  Abdomen: Soft, Nontender, Nondistended, No guarding/rebound, Positive bowel sounds  Extremities: No clubbing, No cyanosis, No edema, No calf tenderness, Right upper extremity ecchymosis, Right groin dressing in place  Neuro: Strength equal bilaterally, No tremors  Psychiatric: Appropriate mood, Normal affect    LABORATORY STUDIES:  ----------------------------------------             9.0    8.40  )-----------( 241      ( 05 Jun 2021 11:26 )             27.4     06-05    137  |  107  |  18.2  ----------------------------<  96  3.9   |  20.0<L>  |  0.86    Ca    8.3<L>      05 Jun 2021 04:43    MEDICATIONS  (STANDING):  amLODIPine   Tablet 5 milliGRAM(s) Oral daily  aspirin  chewable 81 milliGRAM(s) Oral daily  atorvastatin 40 milliGRAM(s) Oral at bedtime  clopidogrel Tablet 75 milliGRAM(s) Oral daily  lisinopril 10 milliGRAM(s) Oral daily  metoprolol succinate ER 25 milliGRAM(s) Oral daily    MEDICATIONS  (PRN):  acetaminophen   Tablet .. 650 milliGRAM(s) Oral every 6 hours PRN Mild Pain (1 - 3)  ondansetron Injectable 4 milliGRAM(s) IV Push every 6 hours PRN Nausea      ASSESSMENT / PLAN:  ----------------------------------------  80F with a history of paroxysmal atrial fibrillation, hypertension, and diverticulitis who presented with a two month history of intermittent chest discomfort with exertion. She later underwent cardiac catheterization with stent placement to the LAD. After the procedure, the patient was noted to have hypotension, nauseam and  BRAVO JAME  ----------------------------------------  The patient was seen at bedside. Patient with retroperitoneal hemorrhage. Offers no complaints except for mild right groin pain. Denied chest pain or palpitations.    Vital Signs Last 24 Hrs  T(C): 36.8 (05 Jun 2021 08:01), Max: 36.8 (04 Jun 2021 16:00)  T(F): 98.2 (05 Jun 2021 08:01), Max: 98.2 (04 Jun 2021 16:00)  HR: 65 (05 Jun 2021 12:00) (56 - 105)  BP: 126/51 (05 Jun 2021 12:00) (94/40 - 155/67)  BP(mean): 72 (05 Jun 2021 12:00) (55 - 95)  RR: 22 (05 Jun 2021 12:00) (14 - 24)  SpO2: 100% (05 Jun 2021 12:00) (95% - 100%)    PHYSICAL EXAMINATION:  ----------------------------------------  General appearance: No acute distress, Awake, Alert  HEENT: Normocephalic, Atraumatic, Conjunctiva clear, EOMI  Neck: Supple, No JVD, No tenderness  Lungs: Breath sound equal bilaterally, No wheezes, No rales  Cardiovascular: S1S2, Regular rhythm  Abdomen: Soft, Nontender, Nondistended, No guarding/rebound, Positive bowel sounds  Extremities: No clubbing, No cyanosis, No edema, No calf tenderness, Right upper extremity ecchymosis, Right groin dressing in place  Neuro: Strength equal bilaterally, No tremors  Psychiatric: Appropriate mood, Normal affect    LABORATORY STUDIES:  ----------------------------------------             9.0    8.40  )-----------( 241      ( 05 Jun 2021 11:26 )             27.4     06-05    137  |  107  |  18.2  ----------------------------<  96  3.9   |  20.0<L>  |  0.86    Ca    8.3<L>      05 Jun 2021 04:43    MEDICATIONS  (STANDING):  amLODIPine   Tablet 5 milliGRAM(s) Oral daily  aspirin  chewable 81 milliGRAM(s) Oral daily  atorvastatin 40 milliGRAM(s) Oral at bedtime  clopidogrel Tablet 75 milliGRAM(s) Oral daily  lisinopril 10 milliGRAM(s) Oral daily  metoprolol succinate ER 25 milliGRAM(s) Oral daily    MEDICATIONS  (PRN):  acetaminophen   Tablet .. 650 milliGRAM(s) Oral every 6 hours PRN Mild Pain (1 - 3)  ondansetron Injectable 4 milliGRAM(s) IV Push every 6 hours PRN Nausea      ASSESSMENT / PLAN:  ----------------------------------------  80F with a history of paroxysmal atrial fibrillation, hypertension, and diverticulitis who presented with a two month history of intermittent chest discomfort with exertion. She later underwent cardiac catheterization with stent placement to the LAD. After the procedure, the patient was noted to have hypotension, bradycardia, nausea, and dizziness. norsynephrine atropine, and protamine were administered. CT of the abdomen later noted a retroperitoneal hemorrhage and the patient as transferred to the intensive care unit for further management.    Retroperitoneal hemorrhage - Acute blood loss anemia noted on initial studies. Most recent studies with a stable hemoglobin. To continue monitoring CBC. Cardiology follow up noted.    Coronary artery disease - Status post stent placement to the LAD. On aspirin and clopidogrel.    Atrial fibrillation - On metoprolol. The patient was not on anticoagulation previously due to financial issues.    Hypertension - Close blood pressure monitoring. Blood pressure improved. On lisinopril and metoprolol.

## 2021-06-05 NOTE — PROGRESS NOTE ADULT - PROBLEM SELECTOR PLAN 1
sp stent   asa plavix restarted, will need plan at home to continue Dual AC  BB statin as tolerated
s/p PCI to pLAD  ASA/Plavix got today dose already per Dr Fu if stable and am H/H stable can continue with doses  bedrest keep right leg straight

## 2021-06-05 NOTE — PROGRESS NOTE ADULT - SUBJECTIVE AND OBJECTIVE BOX
Patient seen and examined.  Trell CP, SOB, N/V.    T(C): 36.8 (06-05-21 @ 08:01)  T(F): 98.2 (06-05-21 @ 08:01)  HR: 62 (06-05-21 @ 11:00)  BP: 124/57 (06-05-21 @ 11:00)  BP(mean): 74 (06-05-21 @ 11:00)  ABP: --  ABP(mean): --  RR: 17 (06-05-21 @ 11:00)  SpO2: 98% (06-05-21 @ 11:00)  Wt(kg): --  CVP(mm Hg): --  CI: --  PA: --  PA(mean): --  PA(direct): --  SVRI: --      Physical Exam:  Gen: A&Ox3  Pulm:  CTA b/l, no r/r/w  CV:  S1S2, RRR, no m/r/g  Abd: +BS, soft, NT, ND  Ext:  +DP b/l, no c/c/e   R groin angioseal dressing in place old blood on dressings soft tender      I&O's Detail    04 Jun 2021 07:01  -  05 Jun 2021 07:00  --------------------------------------------------------  IN:  Total IN: 0 mL    OUT:    Indwelling Catheter - Urethral (mL): 685 mL  Total OUT: 685 mL    Total NET: -685 mL      05 Jun 2021 07:01  -  05 Jun 2021 11:55  --------------------------------------------------------  IN:    Oral Fluid: 500 mL  Total IN: 500 mL    OUT:    Indwelling Catheter - Urethral (mL): 295 mL  Total OUT: 295 mL    Total NET: 205 mL                              9.0    8.40  )-----------( 241      ( 05 Jun 2021 11:26 )             27.4   06-05    137  |  107  |  18.2  ----------------------------<  96  3.9   |  20.0<L>  |  0.86    Ca    8.3<L>      05 Jun 2021 04:43        CAPILLARY BLOOD GLUCOSE            Medications:  acetaminophen   Tablet .. 650 milliGRAM(s) Oral every 6 hours PRN  amLODIPine   Tablet 5 milliGRAM(s) Oral daily  aspirin  chewable 81 milliGRAM(s) Oral daily  atorvastatin 40 milliGRAM(s) Oral at bedtime  clopidogrel Tablet 75 milliGRAM(s) Oral daily  lisinopril 10 milliGRAM(s) Oral daily  metoprolol succinate ER 25 milliGRAM(s) Oral daily  ondansetron Injectable 4 milliGRAM(s) IV Push every 6 hours PRN          Assessment:  Pt is a 80y year old Female  s/p     Patent currently stable, NAD.    Plan:   Patient seen and examined.  Trell CP, SOB, N/V.    T(C): 36.8 (06-05-21 @ 08:01)  T(F): 98.2 (06-05-21 @ 08:01)  HR: 62 (06-05-21 @ 11:00)  BP: 124/57 (06-05-21 @ 11:00)  BP(mean): 74 (06-05-21 @ 11:00)  ABP: --  ABP(mean): --  RR: 17 (06-05-21 @ 11:00)  SpO2: 98% (06-05-21 @ 11:00)  Wt(kg): --  CVP(mm Hg): --  CI: --  PA: --  PA(mean): --  PA(direct): --  SVRI: --    Review of systems +R groin tenderness with palpation, all other ROS negative except as noted in HPI    Physical Exam:  Gen: A&Ox3  Pulm:  CTA b/l, no r/r/w  CV:  S1S2, RRR, no m/r/g  Abd: +BS, soft, NT, ND  Ext:  +DP b/l, no c/c/e   R groin angioseal dressing in place old blood on dressings soft tender      I&O's Detail    04 Jun 2021 07:01  -  05 Jun 2021 07:00  --------------------------------------------------------  IN:  Total IN: 0 mL    OUT:    Indwelling Catheter - Urethral (mL): 685 mL  Total OUT: 685 mL    Total NET: -685 mL      05 Jun 2021 07:01  -  05 Jun 2021 11:55  --------------------------------------------------------  IN:    Oral Fluid: 500 mL  Total IN: 500 mL    OUT:    Indwelling Catheter - Urethral (mL): 295 mL  Total OUT: 295 mL    Total NET: 205 mL                              9.0    8.40  )-----------( 241      ( 05 Jun 2021 11:26 )             27.4   06-05    137  |  107  |  18.2  ----------------------------<  96  3.9   |  20.0<L>  |  0.86    Ca    8.3<L>      05 Jun 2021 04:43        CAPILLARY BLOOD GLUCOSE            Medications:  acetaminophen   Tablet .. 650 milliGRAM(s) Oral every 6 hours PRN  amLODIPine   Tablet 5 milliGRAM(s) Oral daily  aspirin  chewable 81 milliGRAM(s) Oral daily  atorvastatin 40 milliGRAM(s) Oral at bedtime  clopidogrel Tablet 75 milliGRAM(s) Oral daily  lisinopril 10 milliGRAM(s) Oral daily  metoprolol succinate ER 25 milliGRAM(s) Oral daily  ondansetron Injectable 4 milliGRAM(s) IV Push every 6 hours PRN          Assessment:  Pt is a 80y year old Female  s/p     Patent currently stable, NAD.    Plan:

## 2021-06-05 NOTE — PROGRESS NOTE ADULT - SUBJECTIVE AND OBJECTIVE BOX
81 y/o female with PMHx significant for Paroxysmal Afib (on ASA, DOAC unaffordable, previously on Eliquis), HTN, diverticulitis presents to the ER with a two month history of chest pain sent in by her Cardiologist after office visit 6/2. Patient states that she started to experience a burning sensation to mid chest area in April, which initially presents as a burning sensation on exertion then becomes more of a painful sensation with prolonged exertion, with intensity reaching ~ 6/10, pain resolved with resting for ~ 5 minutes, non radiating, without accompanying symptoms. Patient denies SOB, palpitations, dizziness, PND, N/V, leg edema. Patient states that she initially attributed her symptoms to reflux, however symptoms were not alleviated with treatment for GERD. In ER patient is asymptomatic, EKG done revealing NSR, no ischemic changes noted, on telemetry sinus edward cardia with rate 58/min. Initial troponin negative, CXR without acute pathology. Patient underwent unsuccessful left heart catheterization via RRA yesterday with Dr. Lackey 2/2 multiple spasms. She was admitted for overnight monitoring and plan for PCI to LAD / Diagonal today. Was started on Plavix and Aspirin.   s/p LHC via RFA which required 1 NOBLE to proximal LAD; pre and post procedure balloon dilation   Post procedure c/b hypotension and R groin hematoma, briefly requiring pressors  CT scan revealed moderate right pelvic sidewall and retroperitoneal hemorrhage  She was given Protamine, fluids and has been monitored in ICU without any further hypotension  H/H downtrending this am(likely sec to equilibration and fluid resusitatio)  No further pressors needed  Denies any CP, SOB or groin pain  Has not been OOB yet    MEDICATIONS  (STANDING):  amLODIPine   Tablet 5 milliGRAM(s) Oral daily  aspirin  chewable 81 milliGRAM(s) Oral daily  atorvastatin 40 milliGRAM(s) Oral at bedtime  clopidogrel Tablet 75 milliGRAM(s) Oral daily  lisinopril 10 milliGRAM(s) Oral daily  metoprolol succinate ER 25 milliGRAM(s) Oral daily    MEDICATIONS  (PRN):  acetaminophen   Tablet .. 650 milliGRAM(s) Oral every 6 hours PRN Mild Pain (1 - 3)  ondansetron Injectable 4 milliGRAM(s) IV Push every 6 hours PRN Nausea      Allergies    penicillin (Unknown)    Intolerances      PAST MEDICAL & SURGICAL HISTORY:  Diverticulitis    Abscess  abdominal abcess    Essential hypertension    Paroxysmal atrial fibrillation    No significant past surgical history        Vital Signs Last 24 Hrs  T(C): 36.8 (05 Jun 2021 08:01), Max: 36.8 (04 Jun 2021 16:00)  T(F): 98.2 (05 Jun 2021 08:01), Max: 98.2 (04 Jun 2021 16:00)  HR: 59 (05 Jun 2021 10:00) (55 - 105)  BP: 117/47 (05 Jun 2021 10:00) (67/38 - 188/74)  BP(mean): 68 (05 Jun 2021 10:00) (55 - 95)  RR: 20 (05 Jun 2021 10:00) (14 - 24)  SpO2: 96% (05 Jun 2021 10:00) (95% - 100%)    Physical Exam:  Constitutional: NAD, AAOx3  Cardiovascular: +S1S2 RRR  Pulmonary: CTA b/l, unlabored  GI: soft NTND +BS  Extremities: R groin soft without hematoma, fem pulse 2+, no bruit. No pedal edema, +distal pulses b/l  Neuro: non focal, FELDMAN x4    LABS:                        8.7    8.52  )-----------( 245      ( 05 Jun 2021 04:43 )             26.5     06-05    137  |  107  |  18.2  ----------------------------<  96  3.9   |  20.0<L>  |  0.86    Ca    8.3<L>      05 Jun 2021 04:43        RADIOLOGY & ADDITIONAL TESTS:  < from: CT Abdomen and Pelvis No Cont (06.04.21 @ 14:03) >     EXAM:  CT ABDOMEN AND PELVIS                          PROCEDURE DATE:  06/04/2021          INTERPRETATION:  CLINICAL INFORMATION: Status post catheterization. Drop in hematocrit.    COMPARISON: Contrast-enhanced CT of the abdomen and pelvis dated December 6, 2019.    PROCEDURE:  CT of the Abdomen and Pelvis was performed.  Sagittal and coronal reformats were performed.    FINDINGS:  LOWER CHEST: Within normal limits.    LIVER: Within normal limits.  BILE DUCTS: Normal caliber.  GALLBLADDER: Within normal limits.  SPLEEN: Within normal limits.  PANCREAS: Within normal limits.  ADRENALS: Within normal limits.  KIDNEYS/URETERS: Within normal limits. There is excreted contrast in the bilateral renal collecting systems and ureters extending to the bladder.    BLADDER: Distended with excreted contrast and smooth in contour..  REPRODUCTIVE ORGANS: Uterus and adnexa within normal limits.    BOWEL: There is diverticulosis of the sigmoid colon. Otherwise, small and large bowel loops are unremarkable with no evidence of obstruction or inflammatory stranding of the mesenteric fat. The appendix is unremarkable.  PERITONEUM: There is a moderate hyperattenuating collection extending along the right pelvic sidewall to the right posterior pararenalspace, right retroperitoneum, right lateral conal fascia, and right groin, compatible with hematoma. The hematoma extends along the course of the right ureter to the right renal pelvis. There is mild mass effect of the pelvic hematoma upon the bladder.  VESSELS: Within normal limits.  RETROPERITONEUM/LYMPH NODES: No lymphadenopathy.  ABDOMINAL WALL: Within normal limits.  BONES: Moderate anterolisthesis of L4 on L5.    IMPRESSION: Moderate right pelvic sidewall and retroperitoneal hemorrhage.Results discussed with KAMERON Miller in the cardiac catheterization lab at time of interpretation.      < end of copied text >

## 2021-06-05 NOTE — PROGRESS NOTE ADULT - PROBLEM SELECTOR PROBLEM 1
Coronary artery disease, unspecified vessel or lesion type, unspecified whether angina present, unspecified whether native or transplanted heart
Coronary artery disease, unspecified vessel or lesion type, unspecified whether angina present, unspecified whether native or transplanted heart

## 2021-06-05 NOTE — PROGRESS NOTE ADULT - ASSESSMENT
80F presented with 2 month history of CP h/o Afib not on DOAC due to cost.  Cathed and stented to LAD.  Subsequently in holding became acutely hypotensive and LOC.  Dx RP bleed.  Brought to MICU for monitoring.  No transfusions needed and patient remained hemodynamically stable.  Will start asa 81 plavix 75 today and tx to monitored bed.  D/w Dr Grady, in agreement with plan.

## 2021-06-05 NOTE — PROGRESS NOTE ADULT - PROBLEM SELECTOR PLAN 2
H/H stabilized hemodynamically stable, continue to trend H/H
monitor h/h   transfuse only if hgb<8 notify Dr Fu first  hemodynamically stable jud
English

## 2021-06-05 NOTE — PROVIDER CONTACT NOTE (EICU) - SITUATION
Discussed with bedside nurse after rounds with Dr. Grady  - D/C NPO, restart DASH diet  - D/c King  - Neuro checks advanced to Q4H  - OOB to chair order

## 2021-06-05 NOTE — PROGRESS NOTE ADULT - ASSESSMENT
81 y/o female with medical history of pAF (was on Eliquis but currently, no AC d/t cost), HTN, diverticulitis, who presents to [facility]ED for chest pain. Pt states that she has been experiencing a chest burning sensation since April. Chest discomfort described as burning that changes to pain with prolonged exertion, non-radiating, lasting 5 minutes or until she rests. Pt states rest relieves symptoms. Pt saw PCP who recommended she start Pepcid which did not relieve symptoms. Pt saw cardiologist Dr. Monae in [facility] office and she was advised to seek emergent medical care to evaluate symptoms. In ED, ECG- NSR HR @ 62, XRAY-No focal consolidation or pleural effusion, Trop#1-negative. Pt denies feeling chest discomfort at time of physical assessment.  Now s/p LHC via RRA by Dr. Lackey: significant LAD/Diag bifurcation stenosis (prelim results), now s/p LHC which required pre and post procedure balloon dilation and 1 NOBLE to proximal LAD.   s/p LHC 1 NOBLE to proximal LAD, Diag not intervened on   C/B R pelvic wall/RP bleed. H/H trended and down this am. Repeat H/H pending  R groin site stable no further evidence of bleeding- no hematoma noted, site soft non tender, positive pedal pulses bilat  -continue Plavix 75 mg and Aspirin 81 mg daily   -trend H/H  -continue bb/ace/statin  -can likely downgrade from ICU  -May be OOB  -Management as per ICU-Discussed with Dr Grady  -[place] [facility] will continue to follow while in hospital  -patient to follow with Dr. Monae on discharge   -eventual cardiac rehab after discharge discussed

## 2021-06-06 LAB
ANION GAP SERPL CALC-SCNC: 9 MMOL/L — SIGNIFICANT CHANGE UP (ref 5–17)
BUN SERPL-MCNC: 17.2 MG/DL — SIGNIFICANT CHANGE UP (ref 8–20)
CALCIUM SERPL-MCNC: 8.6 MG/DL — SIGNIFICANT CHANGE UP (ref 8.6–10.2)
CHLORIDE SERPL-SCNC: 110 MMOL/L — HIGH (ref 98–107)
CO2 SERPL-SCNC: 22 MMOL/L — SIGNIFICANT CHANGE UP (ref 22–29)
CREAT SERPL-MCNC: 0.79 MG/DL — SIGNIFICANT CHANGE UP (ref 0.5–1.3)
GLUCOSE SERPL-MCNC: 101 MG/DL — HIGH (ref 70–99)
HCT VFR BLD CALC: 24.1 % — LOW (ref 34.5–45)
HCT VFR BLD CALC: 27.4 % — LOW (ref 34.5–45)
HCT VFR BLD CALC: 27.8 % — LOW (ref 34.5–45)
HGB BLD-MCNC: 8 G/DL — LOW (ref 11.5–15.5)
HGB BLD-MCNC: 8.9 G/DL — LOW (ref 11.5–15.5)
HGB BLD-MCNC: 9.2 G/DL — LOW (ref 11.5–15.5)
MAGNESIUM SERPL-MCNC: 1.9 MG/DL — SIGNIFICANT CHANGE UP (ref 1.6–2.6)
MCHC RBC-ENTMCNC: 30.5 PG — SIGNIFICANT CHANGE UP (ref 27–34)
MCHC RBC-ENTMCNC: 30.6 PG — SIGNIFICANT CHANGE UP (ref 27–34)
MCHC RBC-ENTMCNC: 30.8 PG — SIGNIFICANT CHANGE UP (ref 27–34)
MCHC RBC-ENTMCNC: 32.5 GM/DL — SIGNIFICANT CHANGE UP (ref 32–36)
MCHC RBC-ENTMCNC: 33.1 GM/DL — SIGNIFICANT CHANGE UP (ref 32–36)
MCHC RBC-ENTMCNC: 33.2 GM/DL — SIGNIFICANT CHANGE UP (ref 32–36)
MCV RBC AUTO: 92.1 FL — SIGNIFICANT CHANGE UP (ref 80–100)
MCV RBC AUTO: 92.7 FL — SIGNIFICANT CHANGE UP (ref 80–100)
MCV RBC AUTO: 94.2 FL — SIGNIFICANT CHANGE UP (ref 80–100)
PLATELET # BLD AUTO: 224 K/UL — SIGNIFICANT CHANGE UP (ref 150–400)
PLATELET # BLD AUTO: 225 K/UL — SIGNIFICANT CHANGE UP (ref 150–400)
PLATELET # BLD AUTO: 271 K/UL — SIGNIFICANT CHANGE UP (ref 150–400)
POTASSIUM SERPL-MCNC: 4.2 MMOL/L — SIGNIFICANT CHANGE UP (ref 3.5–5.3)
POTASSIUM SERPL-SCNC: 4.2 MMOL/L — SIGNIFICANT CHANGE UP (ref 3.5–5.3)
RBC # BLD: 2.6 M/UL — LOW (ref 3.8–5.2)
RBC # BLD: 2.91 M/UL — LOW (ref 3.8–5.2)
RBC # BLD: 3.02 M/UL — LOW (ref 3.8–5.2)
RBC # FLD: 12.7 % — SIGNIFICANT CHANGE UP (ref 10.3–14.5)
RBC # FLD: 12.8 % — SIGNIFICANT CHANGE UP (ref 10.3–14.5)
RBC # FLD: 12.8 % — SIGNIFICANT CHANGE UP (ref 10.3–14.5)
SODIUM SERPL-SCNC: 140 MMOL/L — SIGNIFICANT CHANGE UP (ref 135–145)
WBC # BLD: 12.1 K/UL — HIGH (ref 3.8–10.5)
WBC # BLD: 7.28 K/UL — SIGNIFICANT CHANGE UP (ref 3.8–10.5)
WBC # BLD: 9 K/UL — SIGNIFICANT CHANGE UP (ref 3.8–10.5)
WBC # FLD AUTO: 12.1 K/UL — HIGH (ref 3.8–10.5)
WBC # FLD AUTO: 7.28 K/UL — SIGNIFICANT CHANGE UP (ref 3.8–10.5)
WBC # FLD AUTO: 9 K/UL — SIGNIFICANT CHANGE UP (ref 3.8–10.5)

## 2021-06-06 PROCEDURE — 99232 SBSQ HOSP IP/OBS MODERATE 35: CPT

## 2021-06-06 PROCEDURE — 99233 SBSQ HOSP IP/OBS HIGH 50: CPT

## 2021-06-06 RX ORDER — MAGNESIUM OXIDE 400 MG ORAL TABLET 241.3 MG
400 TABLET ORAL
Refills: 0 | Status: DISCONTINUED | OUTPATIENT
Start: 2021-06-06 | End: 2021-06-07

## 2021-06-06 RX ORDER — POLYETHYLENE GLYCOL 3350 17 G/17G
17 POWDER, FOR SOLUTION ORAL DAILY
Refills: 0 | Status: DISCONTINUED | OUTPATIENT
Start: 2021-06-06 | End: 2021-06-07

## 2021-06-06 RX ORDER — PANTOPRAZOLE SODIUM 20 MG/1
40 TABLET, DELAYED RELEASE ORAL EVERY 12 HOURS
Refills: 0 | Status: DISCONTINUED | OUTPATIENT
Start: 2021-06-06 | End: 2021-06-07

## 2021-06-06 RX ADMIN — AMLODIPINE BESYLATE 5 MILLIGRAM(S): 2.5 TABLET ORAL at 05:43

## 2021-06-06 RX ADMIN — CLOPIDOGREL BISULFATE 75 MILLIGRAM(S): 75 TABLET, FILM COATED ORAL at 12:27

## 2021-06-06 RX ADMIN — MAGNESIUM OXIDE 400 MG ORAL TABLET 400 MILLIGRAM(S): 241.3 TABLET ORAL at 18:55

## 2021-06-06 RX ADMIN — PANTOPRAZOLE SODIUM 40 MILLIGRAM(S): 20 TABLET, DELAYED RELEASE ORAL at 21:37

## 2021-06-06 RX ADMIN — Medication 81 MILLIGRAM(S): at 12:27

## 2021-06-06 RX ADMIN — Medication 25 MILLIGRAM(S): at 05:43

## 2021-06-06 RX ADMIN — LISINOPRIL 10 MILLIGRAM(S): 2.5 TABLET ORAL at 05:43

## 2021-06-06 RX ADMIN — ATORVASTATIN CALCIUM 40 MILLIGRAM(S): 80 TABLET, FILM COATED ORAL at 21:38

## 2021-06-06 NOTE — PROGRESS NOTE ADULT - ASSESSMENT
INCOMPLETE 80 years old female with history of PAF not on AC, CAD, HTN and Diverticulitis presented with intermittent chest discomfort with exertion for 2 months. Had LHC with PCI to LAD. After procedure, she was noted to have hypotension, bradycardia, nausea and dizziness. Found to have right groin hematoma. She was given Norsynephrine Atropine, Protamine and NS bolus. CT showed retroperitoneal hemorrhage. She was transferred to ICU and was monitored closely. She was downgraded to Medicine Service on 6/5/21. H&H sable.     1) Retroperitoneal Hemorrhage  - Acute Blood Loss Anemia  - H&H stable  - Monitor    2) CAD   - s/p PCI to LAD  - Continue Aspirin, Plavix, Lipitor, Metoprolol and Lisinopril  - Cardiology following     3) HTN  - Continue Metoprolol, Lisinopril and Amlodipine    4) PAF  - Continue Metoprolol  - Continue Aspirin   - Not on AC due to financial issues    DVT Prophylaxis -- SCDs    Dispo: Home in 24 hours if remains stable.        80 years old female with history of PAF not on AC, CAD, HTN and Diverticulitis presented with intermittent chest discomfort with exertion for 2 months. Had LHC with PCI to LAD. After procedure, she was noted to have hypotension, bradycardia, nausea and dizziness. Found to have right groin hematoma. She was given Norsynephrine, Atropine, Protamine and NS bolus. CT showed retroperitoneal hemorrhage. She was transferred to ICU and was monitored closely. She was downgraded to Medicine Service on 6/5/21. H&H sable.     1) Retroperitoneal Hemorrhage  - Acute Blood Loss Anemia  - H&H stable  - Monitor    2) CAD   - s/p PCI to LAD  - Continue Aspirin, Plavix, Lipitor, Metoprolol and Lisinopril  - Cardiology following     3) HTN  - Continue Metoprolol, Lisinopril and Amlodipine    4) PAF  - Continue Metoprolol  - Continue Aspirin   - Not on AC due to financial issues    DVT Prophylaxis -- SCDs    Dispo: Home in 24 hours if remains stable.

## 2021-06-06 NOTE — PROGRESS NOTE ADULT - SUBJECTIVE AND OBJECTIVE BOX
[facility]  [address], [place] [address]    REASON FOR VISIT:  Follow up on cad  UPDATE: HGB down to 8  No abd pain  groin stable  TELEMETRY MONITORING: Sinus rhythm no arrhytmias    06-06    140  |  110<H>  |  17.2  ----------------------------<  101<H>  4.2   |  22.0  |  0.79    Ca    8.6      06 Jun 2021 08:07  Mg     1.9     06-06    MEDICATIONS  (STANDING):  amLODIPine   Tablet 5 milliGRAM(s) Oral daily  aspirin  chewable 81 milliGRAM(s) Oral daily  atorvastatin 40 milliGRAM(s) Oral at bedtime  clopidogrel Tablet 75 milliGRAM(s) Oral daily  lisinopril 10 milliGRAM(s) Oral daily  metoprolol succinate ER 25 milliGRAM(s) Oral daily    ROS:    No fever chills  Cardiac  No cp sob or palp  Resp  no cough no mucus production  Gi  no abd pain no melana  Ext No calf tenderness, no edema    Vital Signs Last 24 Hrs  T(C): 36.8 (06 Jun 2021 05:36), Max: 37.2 (05 Jun 2021 15:43)  T(F): 98.2 (06 Jun 2021 05:36), Max: 99 (05 Jun 2021 15:43)  HR: 68 (06 Jun 2021 05:36) (59 - 71)  BP: 119/61 (06 Jun 2021 05:36) (116/42 - 145/65)  BP(mean): 63 (05 Jun 2021 14:00) (63 - 74)  RR: 16 (06 Jun 2021 05:36) (16 - 23)  SpO2: 95% (06 Jun 2021 05:36) (95% - 100%)  T(C): 36.8 (06-06-21 @ 05:36), Max: 37.2 (06-05-21 @ 15:43)  HR: 68 (06-06-21 @ 05:36) (59 - 71)  BP: 119/61 (06-06-21 @ 05:36) (116/42 - 145/65)  RR: 16 (06-06-21 @ 05:36) (16 - 23)  SpO2: 95% (06-06-21 @ 05:36) (95% - 100%)    HEENT Head atraumatic eomi, oral mucosa moist  CV S1&S2  Regular  LUNGS   CTA  GI  Soft active bowel sounds non tender  Right groin good pulse   no hematoma  EXT  No clubbing/Cyanosis /Edema  NEURO  Alert oriented  No gross motor or sensory deficits    < from: TTE Echo Complete w/o Contrast w/ Doppler (06.02.21 @ 19:06) >   1. Normal left ventricular internal cavity size.   2. Normal global left ventricular systolic function.   3. Left ventricular ejection fraction, by visual estimation, is 60 to 65%.   4. Mildly enlarged right ventricle with normal systolic function.   5. The right atrium is normal in size.   6. The left atrium is normal in size.   7. Sclerotic aortic valve with normal opening.   8. Thickening of the anterior and posterior mitral valve leaflets.   9. Mild mitral annular calcification.  10. Mild mitral valve regurgitation.  11. Mild tricuspid regurgitation.      s/p LHC via RFA which required 1 NOBLE to proximal LAD; pre and post procedure balloon dilation   Post procedure c/b hypotension and R groin hematoma, briefly requiring pressors

## 2021-06-06 NOTE — PROGRESS NOTE ADULT - SUBJECTIVE AND OBJECTIVE BOX
Unstable angina pectoris    HPI:  79 y/o female with PMHx significant for Paroxysmal Afib (on ASA, DOAC unaffordable, previously on Eliquis), HTN, diverticulitis presents to the ER with a two month history of chest pain sent in by her Cardiologist after office visit today. Patient states that she started to experience a burning sensation to mid chest area in April, which initially presents as a burning sensation on exertion then becomes more of a painful sensation with prolonged exertion, with intensity reaching ~ 6/10, pain resolved with resting for ~ 5 minutes, non radiating, without accompanying symptoms. Patient denies SOB, palpitations, dizziness, PND, N/V, leg edema. Patient states that she initially attributed her symptoms to reflux, however symptoms were not alleviated with treatment for GERD. In ER patient is asymptomatic, EKG done revealing NSR, no ischemic changes noted, on telemetry sinus edward cardia with rate 58/min. Initial troponin negative, CXR without acute pathology.      Interval History:  Patient was seen and examined at bedside. Feels better.  Has some soreness in right groin. Denies pain or swelling. Denies paresthesia in right leg.   Denies chest pain, palpitations, shortness of breath, headache, dizziness, visual symptoms, nausea, vomiting or abdominal pain.    ROS:  As per interval history otherwise unremarkable.    PHYSICAL EXAM:  Vital Signs   T(C): 36.8 (06 Jun 2021 05:36), Max: 37.2 (05 Jun 2021 15:43)  T(F): 98.2 (06 Jun 2021 05:36), Max: 99 (05 Jun 2021 15:43)  HR: 68 (06 Jun 2021 05:36) (59 - 71)  BP: 119/61 (06 Jun 2021 05:36) (116/42 - 145/65)  BP(mean): 63 (05 Jun 2021 14:00) (63 - 74)  RR: 16 (06 Jun 2021 05:36) (16 - 23)  SpO2: 95% (06 Jun 2021 05:36) (95% - 100%)  General: Elderly female sitting in chair comfortably. No acute distress  HEENT: EOMI. Clear conjunctivae. Moist mucus membrane  Neck: Supple.   Chest: CTA bilaterally. No wheezing, rales or rhonchi.   Heart: Normal S1 & S2. RRR.   Abdomen: Soft. Non-tender. Non-distended. + BS. Ecchymosis on right groin.   Ext: No pedal edema. No calf tenderness   Neuro: AAO x 3. No focal deficit. No speech disorder  Skin: Warm and Dry  Psychiatry: Normal mood and affect    I&O's Summary    05 Jun 2021 07:01  -  06 Jun 2021 07:00  --------------------------------------------------------  IN: 740 mL / OUT: 945 mL / NET: -205 mL    MEDICATIONS  (STANDING):  amLODIPine   Tablet 5 milliGRAM(s) Oral daily  aspirin  chewable 81 milliGRAM(s) Oral daily  atorvastatin 40 milliGRAM(s) Oral at bedtime  clopidogrel Tablet 75 milliGRAM(s) Oral daily  lisinopril 10 milliGRAM(s) Oral daily  metoprolol succinate ER 25 milliGRAM(s) Oral daily    MEDICATIONS  (PRN):  acetaminophen   Tablet .. 650 milliGRAM(s) Oral every 6 hours PRN Mild Pain (1 - 3)  ondansetron Injectable 4 milliGRAM(s) IV Push every 6 hours PRN Nausea    LABS:                       8.0    7.28  )-----------( 224      ( 06 Jun 2021 08:08 )             24.1     06-06    140  |  110<H>  |  17.2  ----------------------------<  101<H>  4.2   |  22.0  |  0.79    Ca    8.6      06 Jun 2021 08:07  Mg     1.9     06-06    RADIOLOGY & ADDITIONAL STUDIES:  Reviewed

## 2021-06-06 NOTE — PROGRESS NOTE ADULT - ASSESSMENT
79 y/o female with medical history of pAF (was on Eliquis but currently, no AC d/t cost), HTN, diverticulitis, who presents to [facility] for chest pain. Pt states that she has been experiencing a chest burning sensation since April. Chest discomfort described as burning that changes to pain with prolonged exertion, non-radiating, lasting 5 minutes or until she rests. Pt states rest relieves symptoms. Pt saw PCP who recommended she start Pepcid which did not relieve symptoms. Pt saw cardiologist Dr. Monae in [facility] office and she was advised to seek emergent medical care to evaluate symptoms. In ED, ECG- NSR HR @ 62, XRAY-No focal consolidation or pleural effusion, Trop#1-negative. Pt denies feeling chest discomfort at time of physical assessment.  Now s/p LHC via RRA by Dr. Lackey: significant LAD/Diag bifurcation stenosis (prelim results), now s/p LHC which required pre and post procedure balloon dilation and 1 NOBLE to proximal LAD. s/p LHC 1 NOBLE to proximal LAD, Diag not intervened on   6-6-21 Hemodynamically stable  no groin pain    CAD s/p STent with post procedure RPB  Hemodynamically stable  C/W PLAVIX ASA DISCUSSED DUAL ANTIPLATELET AGENT  CARDIAC REHAB DISCUSSED BUT PATIENT CHOOSES TO POWER WALK  C/W ATORVASTATIN    RETROPERITONEAL BLEED  Recheck cbc later today ? equilibration  no active s/s of bleeding    HTN controlled with norvasc & lisinoptril      MEDICATIONS:  Cardiac  amLODIPine   Tablet 5 milliGRAM(s) Oral daily  aspirin  chewable 81 milliGRAM(s) Oral daily  atorvastatin 40 milliGRAM(s) Oral at bedtime  clopidogrel Tablet 75 milliGRAM(s) Oral daily  lisinopril 10 milliGRAM(s) Oral daily  metoprolol succinate ER 25 milliGRAM(s) Oral daily

## 2021-06-06 NOTE — PROGRESS NOTE ADULT - ATTENDING COMMENTS
80F s/p PCI for treatment of exertional chest pain    - s/p PCI x1 of pLAD on 6/4 per Dr Lackey  - continue DAPT  - post-procedural hematoma and RP bleed with hypotension  - HB mildly downtrended; continue to monitor, prefer Hb >8  - now weaned off vasopressors and home antihypertensives restarted
80F s/p PCI for treatment of exertional chest pain    - s/p PCI x1 of pLAD on 6/4 per Dr Lackey  - continue DAPT  - post-procedural hematoma and RP bleed with hypotension  - Hb appears to have stabilized and remains off vasopressors  - continue antihypertensives  - no additional cardiac testing is recommended at this time
Pt is seen, examined, chart reviewed, d/w np/pa.  Management as outlined above.  LHC /PCI today to LAD/Diag Bifurcation stenosis  Pt received Asprin/ Plavix prior to cardiac cath
Pt is seen, examined, chart reviewed, d/w np/pa.  Management as outlined above.  Admit to tele secondary obstructive CAD requiring PCI in am   Plan for LAD/Diag PCI in am (secondary to multiple spasms via RRA approach today) with Dr. Lackey via RFA approach  NPO after MN for procedure in am
Attending Attestation:  I personally saw and evaluated the patient and agree with the above assessment and plan  Changes made above where appropriate    EXAM:  General: NAD  Lungs: CTAB  Heart: RR  Abdomen: nt, nd, benign  Ext: no edema  Neuro: AOx4  Skin: intact    Brief A/P:  79 yo with hx of a-fib not on full AC, hypertension presenting with chest pain on exertion, improves with rest. For cath today. Will follow results. Additionally reports that shortly after eating the symptoms are worse. Patient has not been having relief with famotidine prescribed by her physicians.

## 2021-06-07 ENCOUNTER — TRANSCRIPTION ENCOUNTER (OUTPATIENT)
Age: 81
End: 2021-06-07

## 2021-06-07 VITALS
DIASTOLIC BLOOD PRESSURE: 66 MMHG | OXYGEN SATURATION: 100 % | TEMPERATURE: 99 F | SYSTOLIC BLOOD PRESSURE: 112 MMHG | HEART RATE: 81 BPM | RESPIRATION RATE: 18 BRPM

## 2021-06-07 LAB
ANION GAP SERPL CALC-SCNC: 9 MMOL/L — SIGNIFICANT CHANGE UP (ref 5–17)
BUN SERPL-MCNC: 21.1 MG/DL — HIGH (ref 8–20)
CALCIUM SERPL-MCNC: 8.5 MG/DL — LOW (ref 8.6–10.2)
CHLORIDE SERPL-SCNC: 108 MMOL/L — HIGH (ref 98–107)
CO2 SERPL-SCNC: 22 MMOL/L — SIGNIFICANT CHANGE UP (ref 22–29)
CREAT SERPL-MCNC: 0.73 MG/DL — SIGNIFICANT CHANGE UP (ref 0.5–1.3)
GLUCOSE SERPL-MCNC: 100 MG/DL — HIGH (ref 70–99)
HCT VFR BLD CALC: 24.8 % — LOW (ref 34.5–45)
HGB BLD-MCNC: 8 G/DL — LOW (ref 11.5–15.5)
MAGNESIUM SERPL-MCNC: 1.8 MG/DL — SIGNIFICANT CHANGE UP (ref 1.6–2.6)
MCHC RBC-ENTMCNC: 30.1 PG — SIGNIFICANT CHANGE UP (ref 27–34)
MCHC RBC-ENTMCNC: 32.3 GM/DL — SIGNIFICANT CHANGE UP (ref 32–36)
MCV RBC AUTO: 93.2 FL — SIGNIFICANT CHANGE UP (ref 80–100)
PLATELET # BLD AUTO: 221 K/UL — SIGNIFICANT CHANGE UP (ref 150–400)
POTASSIUM SERPL-MCNC: 4.5 MMOL/L — SIGNIFICANT CHANGE UP (ref 3.5–5.3)
POTASSIUM SERPL-SCNC: 4.5 MMOL/L — SIGNIFICANT CHANGE UP (ref 3.5–5.3)
RBC # BLD: 2.66 M/UL — LOW (ref 3.8–5.2)
RBC # FLD: 12.9 % — SIGNIFICANT CHANGE UP (ref 10.3–14.5)
SODIUM SERPL-SCNC: 139 MMOL/L — SIGNIFICANT CHANGE UP (ref 135–145)
WBC # BLD: 7.76 K/UL — SIGNIFICANT CHANGE UP (ref 3.8–10.5)
WBC # FLD AUTO: 7.76 K/UL — SIGNIFICANT CHANGE UP (ref 3.8–10.5)

## 2021-06-07 PROCEDURE — C1760: CPT

## 2021-06-07 PROCEDURE — 86900 BLOOD TYPING SEROLOGIC ABO: CPT

## 2021-06-07 PROCEDURE — 92978 ENDOLUMINL IVUS OCT C 1ST: CPT | Mod: LD

## 2021-06-07 PROCEDURE — C9600: CPT | Mod: LD

## 2021-06-07 PROCEDURE — U0003: CPT

## 2021-06-07 PROCEDURE — 80061 LIPID PANEL: CPT

## 2021-06-07 PROCEDURE — 85025 COMPLETE CBC W/AUTO DIFF WBC: CPT

## 2021-06-07 PROCEDURE — 83036 HEMOGLOBIN GLYCOSYLATED A1C: CPT

## 2021-06-07 PROCEDURE — 93458 L HRT ARTERY/VENTRICLE ANGIO: CPT

## 2021-06-07 PROCEDURE — 83735 ASSAY OF MAGNESIUM: CPT

## 2021-06-07 PROCEDURE — 71045 X-RAY EXAM CHEST 1 VIEW: CPT

## 2021-06-07 PROCEDURE — 36415 COLL VENOUS BLD VENIPUNCTURE: CPT

## 2021-06-07 PROCEDURE — C1894: CPT

## 2021-06-07 PROCEDURE — 86901 BLOOD TYPING SEROLOGIC RH(D): CPT

## 2021-06-07 PROCEDURE — 93306 TTE W/DOPPLER COMPLETE: CPT

## 2021-06-07 PROCEDURE — 76937 US GUIDE VASCULAR ACCESS: CPT | Mod: 26,59

## 2021-06-07 PROCEDURE — 84484 ASSAY OF TROPONIN QUANT: CPT

## 2021-06-07 PROCEDURE — 74177 CT ABD & PELVIS W/CONTRAST: CPT | Mod: 26

## 2021-06-07 PROCEDURE — 84100 ASSAY OF PHOSPHORUS: CPT

## 2021-06-07 PROCEDURE — 86850 RBC ANTIBODY SCREEN: CPT

## 2021-06-07 PROCEDURE — C1769: CPT

## 2021-06-07 PROCEDURE — 80053 COMPREHEN METABOLIC PANEL: CPT

## 2021-06-07 PROCEDURE — 74177 CT ABD & PELVIS W/CONTRAST: CPT

## 2021-06-07 PROCEDURE — 99153 MOD SED SAME PHYS/QHP EA: CPT

## 2021-06-07 PROCEDURE — 36569 INSJ PICC 5 YR+ W/O IMAGING: CPT

## 2021-06-07 PROCEDURE — 86923 COMPATIBILITY TEST ELECTRIC: CPT

## 2021-06-07 PROCEDURE — 74176 CT ABD & PELVIS W/O CONTRAST: CPT

## 2021-06-07 PROCEDURE — 80048 BASIC METABOLIC PNL TOTAL CA: CPT

## 2021-06-07 PROCEDURE — C1874: CPT

## 2021-06-07 PROCEDURE — C1753: CPT

## 2021-06-07 PROCEDURE — 93005 ELECTROCARDIOGRAM TRACING: CPT

## 2021-06-07 PROCEDURE — 86769 SARS-COV-2 COVID-19 ANTIBODY: CPT

## 2021-06-07 PROCEDURE — C1887: CPT

## 2021-06-07 PROCEDURE — U0005: CPT

## 2021-06-07 PROCEDURE — C1725: CPT

## 2021-06-07 PROCEDURE — 99239 HOSP IP/OBS DSCHRG MGMT >30: CPT

## 2021-06-07 PROCEDURE — 99152 MOD SED SAME PHYS/QHP 5/>YRS: CPT

## 2021-06-07 PROCEDURE — 83880 ASSAY OF NATRIURETIC PEPTIDE: CPT

## 2021-06-07 PROCEDURE — 99285 EMERGENCY DEPT VISIT HI MDM: CPT | Mod: 25

## 2021-06-07 PROCEDURE — 85027 COMPLETE CBC AUTOMATED: CPT

## 2021-06-07 RX ORDER — FERROUS SULFATE 325(65) MG
325 TABLET ORAL
Refills: 0 | Status: DISCONTINUED | OUTPATIENT
Start: 2021-06-07 | End: 2021-06-07

## 2021-06-07 RX ORDER — FERROUS SULFATE 325(65) MG
1 TABLET ORAL
Qty: 60 | Refills: 0
Start: 2021-06-07 | End: 2021-07-06

## 2021-06-07 RX ORDER — SENNA PLUS 8.6 MG/1
2 TABLET ORAL
Qty: 60 | Refills: 0
Start: 2021-06-07 | End: 2021-07-06

## 2021-06-07 RX ORDER — CLOPIDOGREL BISULFATE 75 MG/1
1 TABLET, FILM COATED ORAL
Qty: 30 | Refills: 3
Start: 2021-06-07 | End: 2021-10-04

## 2021-06-07 RX ORDER — PANTOPRAZOLE SODIUM 20 MG/1
40 TABLET, DELAYED RELEASE ORAL
Refills: 0 | Status: DISCONTINUED | OUTPATIENT
Start: 2021-06-07 | End: 2021-06-07

## 2021-06-07 RX ORDER — SENNA PLUS 8.6 MG/1
2 TABLET ORAL AT BEDTIME
Refills: 0 | Status: DISCONTINUED | OUTPATIENT
Start: 2021-06-07 | End: 2021-06-07

## 2021-06-07 RX ORDER — AMLODIPINE BESYLATE 2.5 MG/1
1 TABLET ORAL
Qty: 30 | Refills: 0
Start: 2021-06-07 | End: 2021-07-06

## 2021-06-07 RX ORDER — PANTOPRAZOLE SODIUM 20 MG/1
1 TABLET, DELAYED RELEASE ORAL
Qty: 30 | Refills: 0
Start: 2021-06-07 | End: 2021-07-06

## 2021-06-07 RX ORDER — FAMOTIDINE 10 MG/ML
1 INJECTION INTRAVENOUS
Qty: 0 | Refills: 0 | DISCHARGE

## 2021-06-07 RX ORDER — ATORVASTATIN CALCIUM 80 MG/1
1 TABLET, FILM COATED ORAL
Qty: 30 | Refills: 0
Start: 2021-06-07 | End: 2021-07-06

## 2021-06-07 RX ORDER — FERROUS SULFATE 325(65) MG
325 TABLET ORAL DAILY
Refills: 0 | Status: DISCONTINUED | OUTPATIENT
Start: 2021-06-07 | End: 2021-06-07

## 2021-06-07 RX ADMIN — PANTOPRAZOLE SODIUM 40 MILLIGRAM(S): 20 TABLET, DELAYED RELEASE ORAL at 05:40

## 2021-06-07 RX ADMIN — PANTOPRAZOLE SODIUM 40 MILLIGRAM(S): 20 TABLET, DELAYED RELEASE ORAL at 13:31

## 2021-06-07 RX ADMIN — MAGNESIUM OXIDE 400 MG ORAL TABLET 400 MILLIGRAM(S): 241.3 TABLET ORAL at 08:17

## 2021-06-07 RX ADMIN — CLOPIDOGREL BISULFATE 75 MILLIGRAM(S): 75 TABLET, FILM COATED ORAL at 13:31

## 2021-06-07 RX ADMIN — Medication 81 MILLIGRAM(S): at 13:31

## 2021-06-07 RX ADMIN — MAGNESIUM OXIDE 400 MG ORAL TABLET 400 MILLIGRAM(S): 241.3 TABLET ORAL at 13:31

## 2021-06-07 RX ADMIN — Medication 25 MILLIGRAM(S): at 05:40

## 2021-06-07 RX ADMIN — POLYETHYLENE GLYCOL 3350 17 GRAM(S): 17 POWDER, FOR SOLUTION ORAL at 08:16

## 2021-06-07 RX ADMIN — LISINOPRIL 10 MILLIGRAM(S): 2.5 TABLET ORAL at 05:40

## 2021-06-07 RX ADMIN — AMLODIPINE BESYLATE 5 MILLIGRAM(S): 2.5 TABLET ORAL at 05:40

## 2021-06-07 NOTE — DISCHARGE NOTE NURSING/CASE MANAGEMENT/SOCIAL WORK - PATIENT PORTAL LINK FT
You can access the FollowMyHealth Patient Portal offered by [facility] by registering at the following website: [email or web address]. By joining [employer]’s FollowMyHealth portal, you will also be able to view your health information using other applications (apps) compatible with our system.

## 2021-06-07 NOTE — PROGRESS NOTE ADULT - NSICDXPILOT_GEN_ALL_CORE
[place]

## 2021-06-07 NOTE — PROGRESS NOTE ADULT - REASON FOR ADMISSION
Chest pain

## 2021-06-07 NOTE — PROGRESS NOTE ADULT - SUBJECTIVE AND OBJECTIVE BOX
Unstable angina pectoris    HPI:  81 y/o female with PMHx significant for Paroxysmal Afib (on ASA, DOAC unaffordable, previously on Eliquis), HTN, diverticulitis presents to the ER with a two month history of chest pain sent in by her Cardiologist after office visit today. Patient states that she started to experience a burning sensation to mid chest area in April, which initially presents as a burning sensation on exertion then becomes more of a painful sensation with prolonged exertion, with intensity reaching ~ 6/10, pain resolved with resting for ~ 5 minutes, non radiating, without accompanying symptoms. Patient denies SOB, palpitations, dizziness, PND, N/V, leg edema. Patient states that she initially attributed her symptoms to reflux, however symptoms were not alleviated with treatment for GERD. In ER patient is asymptomatic, EKG done revealing NSR, no ischemic changes noted, on telemetry sinus edward cardia with rate 58/min. Initial troponin negative, CXR without acute pathology.      Interval History:  Patient was seen and examined at bedside around 10 am.  Had an episode of bleeding per rectum yesterday evening. No abdominal pain, nausea or vomiting.   H&H stable. CTA with no active bleeding.   Soreness in right groin improving. Denies pain or swelling. Denies paresthesia in right leg.   Denies chest pain, palpitations, shortness of breath, headache, dizziness or visual symptoms.     ROS:  As per interval history otherwise unremarkable.    PHYSICAL EXAM:  Vital Signs   T(C): 37 (07 Jun 2021 10:00), Max: 37.1 (06 Jun 2021 15:35)  T(F): 98.6 (07 Jun 2021 10:00), Max: 98.8 (06 Jun 2021 15:35)  HR: 81 (07 Jun 2021 10:00) (65 - 81)  BP: 112/66 (07 Jun 2021 10:00) (112/66 - 138/75)  RR: 18 (07 Jun 2021 10:00) (18 - 18)  SpO2: 100% (07 Jun 2021 10:00) (95% - 100%)  General: Elderly female sitting in chair comfortably. No acute distress  HEENT: EOMI. Clear conjunctivae. Moist mucus membrane  Neck: Supple.   Chest: CTA bilaterally. No wheezing, rales or rhonchi.   Heart: Normal S1 & S2. RRR.   Abdomen: Soft. Non-tender. Non-distended. + BS. Ecchymosis on right groin.   Ext: No pedal edema. No calf tenderness   Neuro: AAO x 3. No focal deficit. No speech disorder  Skin: Warm and Dry  Psychiatry: Normal mood and affect    I&O's Summary    06 Jun 2021 07:01  -  07 Jun 2021 07:00  --------------------------------------------------------  IN: 240 mL / OUT: 0 mL / NET: 240 mL    MEDICATIONS  (STANDING):  amLODIPine   Tablet 5 milliGRAM(s) Oral daily  aspirin  chewable 81 milliGRAM(s) Oral daily  atorvastatin 40 milliGRAM(s) Oral at bedtime  clopidogrel Tablet 75 milliGRAM(s) Oral daily  ferrous    sulfate 325 milliGRAM(s) Oral two times a day  lisinopril 10 milliGRAM(s) Oral daily  magnesium oxide 400 milliGRAM(s) Oral three times a day with meals  metoprolol succinate ER 25 milliGRAM(s) Oral daily  pantoprazole    Tablet 40 milliGRAM(s) Oral before breakfast    MEDICATIONS  (PRN):  acetaminophen   Tablet .. 650 milliGRAM(s) Oral every 6 hours PRN Mild Pain (1 - 3)  ondansetron Injectable 4 milliGRAM(s) IV Push every 6 hours PRN Nausea  polyethylene glycol 3350 17 Gram(s) Oral daily PRN Constipation  senna 2 Tablet(s) Oral at bedtime PRN Constipation    LABS:                        8.0    7.76  )-----------( 221      ( 07 Jun 2021 06:26 )             24.8     06-07    139  |  108<H>  |  21.1<H>  ----------------------------<  100<H>  4.5   |  22.0  |  0.73    Ca    8.5<L>      07 Jun 2021 06:23  Mg     1.8     06-07    RADIOLOGY & ADDITIONAL STUDIES:  Reviewed

## 2021-06-07 NOTE — PROGRESS NOTE ADULT - ASSESSMENT
80 years old female with history of PAF not on AC, CAD, HTN and Diverticulitis presented with intermittent chest discomfort with exertion for 2 months. Had LHC with PCI to LAD. After procedure, she was noted to have hypotension, bradycardia, nausea and dizziness. Found to have right groin hematoma. She was given Norsynephrine, Atropine, Protamine and NS bolus. CT showed retroperitoneal hemorrhage. She was transferred to ICU and was monitored closely. She was downgraded to Medicine Service on 6/5/21. H&H sable.     1) Retroperitoneal Hemorrhage  - Acute Blood Loss Anemia  - H&H stable  - CTA Abd / Pelvis with decreasing hematoma, no active bleeding   - Start Ferrous Sulfate     2) CAD   - s/p PCI to LAD  - Continue Aspirin, Plavix, Lipitor, Metoprolol and Lisinopril  - Cardiology follow up noted     3) HTN  - Continue Metoprolol, Lisinopril and Amlodipine    4) PAF  - Continue Metoprolol  - Continue Aspirin   - Not on AC due to financial issues    DVT Prophylaxis -- SCDs    Dispo: D/C Home.    Discussed with daughter in law over the phone.

## 2021-06-07 NOTE — PROGRESS NOTE ADULT - PROVIDER SPECIALTY LIST ADULT
Cardiology
Hospitalist
Cardiology
Critical Care
Critical Care

## 2021-06-07 NOTE — DISCHARGE NOTE NURSING/CASE MANAGEMENT/SOCIAL WORK - NSDCFUADDAPPT_GEN_ALL_CORE_FT
Follow up with Dr. Monae in one to two weeks. Please call the office to make an appointment.   Follow up with PMD in 1 week.

## 2021-06-10 ENCOUNTER — NON-APPOINTMENT (OUTPATIENT)
Age: 81
End: 2021-06-10

## 2021-06-11 ENCOUNTER — APPOINTMENT (OUTPATIENT)
Dept: CARDIOLOGY | Facility: CLINIC | Age: 81
End: 2021-06-11
Payer: MEDICARE

## 2021-06-11 VITALS
DIASTOLIC BLOOD PRESSURE: 78 MMHG | OXYGEN SATURATION: 99 % | WEIGHT: 162 LBS | HEART RATE: 75 BPM | BODY MASS INDEX: 25.73 KG/M2 | SYSTOLIC BLOOD PRESSURE: 134 MMHG | HEIGHT: 66.5 IN | TEMPERATURE: 98.3 F

## 2021-06-11 VITALS — SYSTOLIC BLOOD PRESSURE: 130 MMHG | DIASTOLIC BLOOD PRESSURE: 78 MMHG

## 2021-06-11 DIAGNOSIS — R19.09 OTHER INTRA-ABDOMINAL AND PELVIC SWELLING, MASS AND LUMP: ICD-10-CM

## 2021-06-11 DIAGNOSIS — D64.9 ANEMIA, UNSPECIFIED: ICD-10-CM

## 2021-06-11 DIAGNOSIS — R94.31 ABNORMAL ELECTROCARDIOGRAM [ECG] [EKG]: ICD-10-CM

## 2021-06-11 DIAGNOSIS — I48.0 PAROXYSMAL ATRIAL FIBRILLATION: ICD-10-CM

## 2021-06-11 DIAGNOSIS — E78.00 PURE HYPERCHOLESTEROLEMIA, UNSPECIFIED: ICD-10-CM

## 2021-06-11 DIAGNOSIS — I10 ESSENTIAL (PRIMARY) HYPERTENSION: ICD-10-CM

## 2021-06-11 PROCEDURE — 99214 OFFICE O/P EST MOD 30 MIN: CPT

## 2021-06-11 PROCEDURE — 93000 ELECTROCARDIOGRAM COMPLETE: CPT

## 2021-06-11 RX ORDER — METRONIDAZOLE 500 MG/1
TABLET ORAL
Refills: 0 | Status: DISCONTINUED | COMMUNITY
End: 2021-06-11

## 2021-06-11 RX ORDER — ASPIRIN 325 MG/1
325 TABLET, FILM COATED ORAL
Refills: 0 | Status: DISCONTINUED | COMMUNITY
End: 2021-06-11

## 2021-06-11 RX ORDER — METOPROLOL SUCCINATE 50 MG/1
50 TABLET, EXTENDED RELEASE ORAL DAILY
Qty: 30 | Refills: 5 | Status: DISCONTINUED | COMMUNITY
Start: 2018-07-17 | End: 2021-06-11

## 2021-06-11 RX ORDER — MECLIZINE HYDROCHLORIDE 25 MG/1
25 TABLET ORAL
Refills: 0 | Status: DISCONTINUED | COMMUNITY
End: 2021-06-11

## 2021-06-11 RX ORDER — AMLODIPINE BESYLATE 5 MG/1
5 TABLET ORAL DAILY
Refills: 0 | Status: ACTIVE | COMMUNITY

## 2021-06-14 ENCOUNTER — NON-APPOINTMENT (OUTPATIENT)
Age: 81
End: 2021-06-14

## 2021-06-14 LAB
BASOPHILS # BLD AUTO: 0.06 K/UL
BASOPHILS NFR BLD AUTO: 0.7 %
EOSINOPHIL # BLD AUTO: 0.34 K/UL
EOSINOPHIL NFR BLD AUTO: 4.1 %
HCT VFR BLD CALC: 28.9 %
HGB BLD-MCNC: 9.2 G/DL
IMM GRANULOCYTES NFR BLD AUTO: 0.4 %
LYMPHOCYTES # BLD AUTO: 1.84 K/UL
LYMPHOCYTES NFR BLD AUTO: 22.2 %
MAN DIFF?: NORMAL
MCHC RBC-ENTMCNC: 30.5 PG
MCHC RBC-ENTMCNC: 31.8 GM/DL
MCV RBC AUTO: 95.7 FL
MONOCYTES # BLD AUTO: 0.71 K/UL
MONOCYTES NFR BLD AUTO: 8.6 %
NEUTROPHILS # BLD AUTO: 5.3 K/UL
NEUTROPHILS NFR BLD AUTO: 64 %
PLATELET # BLD AUTO: 382 K/UL
RBC # BLD: 3.02 M/UL
RBC # FLD: 13.3 %
WBC # FLD AUTO: 8.28 K/UL

## 2021-06-18 ENCOUNTER — APPOINTMENT (OUTPATIENT)
Dept: CARDIOLOGY | Facility: CLINIC | Age: 81
End: 2021-06-18
Payer: MEDICARE

## 2021-06-18 PROCEDURE — 93971 EXTREMITY STUDY: CPT

## 2021-08-02 PROBLEM — R94.31 ABNORMAL ECG: Status: ACTIVE | Noted: 2018-07-17

## 2021-08-02 PROBLEM — I48.0 PAROXYSMAL ATRIAL FIBRILLATION: Status: ACTIVE | Noted: 2018-07-17

## 2021-08-02 PROBLEM — E78.00 HIGH CHOLESTEROL: Status: ACTIVE | Noted: 2018-07-18

## 2021-08-09 ENCOUNTER — NON-APPOINTMENT (OUTPATIENT)
Age: 81
End: 2021-08-09

## 2021-10-06 PROBLEM — I10 ESSENTIAL HYPERTENSION: Status: ACTIVE | Noted: 2018-07-17

## 2021-10-20 ENCOUNTER — APPOINTMENT (OUTPATIENT)
Dept: ORTHOPEDIC SURGERY | Facility: CLINIC | Age: 81
End: 2021-10-20
Payer: MEDICARE

## 2021-10-20 PROCEDURE — 73610 X-RAY EXAM OF ANKLE: CPT | Mod: LT

## 2021-10-20 PROCEDURE — 73630 X-RAY EXAM OF FOOT: CPT | Mod: LT

## 2021-10-20 PROCEDURE — 97760 ORTHOTIC MGMT&TRAING 1ST ENC: CPT

## 2021-10-20 PROCEDURE — 99204 OFFICE O/P NEW MOD 45 MIN: CPT

## 2021-10-20 NOTE — REASON FOR VISIT
[Initial Visit] : an initial visit for [Family Member] : family member [FreeTextEntry2] : left ankle injury

## 2021-10-20 NOTE — HISTORY OF PRESENT ILLNESS
[FreeTextEntry1] : 10/20/2021: BRAVO KILGORE is a 80 year old female presenting for an initial evaluation of left ankle injury. The patient’s pain is noted to be a 6/10.The patient states that she fell a few days prior due to a misstep injury. The patient went to an outside provider who obtained XR imaging and diagnosed her with a fracture to the lateral aspect of the talus. She was given a brace at the previous evaluation. The patient states that she has been weightbearing since the injury. She  also states that walking exacerbates her pain. The patient presents ambulating with a knee scooter and the brace previously given. She is currently utilizing blood thinners. No other complaints.

## 2021-10-20 NOTE — PHYSICAL EXAM
[de-identified] : General: Alert and oriented x3. In no acute distress. Pleasant in nature with a normal affect. No apparent respiratory distress.\par \par Left Foot and Ankle Exam\par Skin: Clean, dry, intact\par Inspection: No obvious malalignment, no masses, + Significant dorsal foot swelling, no effusion. Pes cavus. \par Pulses: 2+ DP/PT pulses\par ROM: FOOT Full  ROM of digits, ANKLE 10 degrees of dorsiflexion, 40 degrees of plantarflexion, 10 degrees of subtalar motion.\par Painful ROM: None\par Tenderness:  + Tenderness to the lateral fibula. + Tenderness to the medial aspect of the ankle. No tenderness over the medial malleolus, No tenderness over the lateral malleolus, + CFL/ATFL/PTFL pain, no deltoid ligament pain. No heel pain. No Achilles tenderness. No 5th metatarsal pain. No pain to the LisFranc joint. No ttp over the posterior tibial tendon.\par Stability: Negative anterior/posterior drawer.\par Strength: 5/5 ADD/ABD/TA/GS/EHL/FHL/EDL\par Neuro: Sensation in tact to light touch throughout\par Additional tests: Negative Mortons test, negative tarsal tunnel tinels, negative single heel rise.	 [de-identified] : 3V of the left ankle were ordered, obtained, and reviewed by me today, 10/20/2021, revealed: Osteopenia. Small tejinder to the talus.\par

## 2021-10-20 NOTE — DISCUSSION/SUMMARY
[de-identified] : Today I had a lengthy discussion with the patient regarding their left ankle injury. I have addressed all the patient's concerns surrounding the pathology of their condition. XR imaging was completed in office today and results were reviewed with the patient. I recommended that the patient utilize a CAM boot. The patient was fitted for the CAM boot in the office today. The patient was educated about the boot wear pattern and utilization, as well as the timeframe to come out of the boot. She was also given full instructions for using the boot. The patient may weight bear as tolerated while in the CAM boot. She will also continue to utilize the Aspirin and Plavix for blood thinning medication. \par \par I recommend that the patient utilize ice, NSAIDs, and heat PRN. They can also elevate their left ankle above the level of the heart.		\par \par The patient understood and verbally agreed to the treatment plan. All of their questions were answered and they were satisfied with the visit. The patient should call the office if they have any questions or experience worsening symptoms. I would like to see the patient back in the office in 2 weeks to reassess their condition. 				\par \par The patient will be traveling to [place]. Advised to the patient to utilize the blood thinners prior to her scheduled flight and after.

## 2021-10-20 NOTE — ADDENDUM
[FreeTextEntry1] : I, Frida Coughlin, acted solely as a scribe for Dr. Chai Patricia on this date 10/20/2021.\par \par All medical record entries made by the Scribe were at my, Dr. Chai Patricia, direction and personally dictated by me on 10/20/2021 . I have reviewed the chart and agree that the record accurately reflects my personal performance of the history, physical exam, assessment and plan. I have also personally directed, reviewed, and agreed with the chart.	\par

## 2021-11-08 ENCOUNTER — APPOINTMENT (OUTPATIENT)
Dept: ORTHOPEDIC SURGERY | Facility: CLINIC | Age: 81
End: 2021-11-08
Payer: MEDICARE

## 2021-11-08 DIAGNOSIS — S99.912A UNSPECIFIED INJURY OF LEFT ANKLE, INITIAL ENCOUNTER: ICD-10-CM

## 2021-11-08 PROCEDURE — 99213 OFFICE O/P EST LOW 20 MIN: CPT

## 2021-11-08 NOTE — ADDENDUM
[FreeTextEntry1] : I, Frida Coughlin, acted solely as a scribe for Dr. Chai Patricia on this date 11/08/2021.\par \par All medical record entries made by the Scribe were at my, Dr. Chai Patricia, direction and personally dictated by me on 11/08/2021 . I have reviewed the chart and agree that the record accurately reflects my personal performance of the history, physical exam, assessment and plan. I have also personally directed, reviewed, and agreed with the chart.	\par

## 2021-11-08 NOTE — DISCUSSION/SUMMARY
[de-identified] : Today I had a lengthy discussion with the patient regarding their left ankle injury. I have addressed all the patient's concerns surrounding the pathology of their condition. I recommend the patient undergo a course of physical therapy for the left ankle  2-3 times a week for a total of 6-8 weeks. A prescription was given for the physical therapy today. With the guidance of physical therapy, I recommended that the patient begin to transition out of the CAM boot to an ASO brace. The ASO brace was given today. The patient understood and verbally agreed to the treatment plan. All of their questions were answered and they were satisfied with the visit. The patient should call the office if they have any questions or experience worsening symptoms. I would like to see the patient back in the office in PRN to reassess their condition. 				\par

## 2021-11-08 NOTE — HISTORY OF PRESENT ILLNESS
[FreeTextEntry1] : 11/8/2021: The patient is a 80 year old female presenting for a follow-up evaluation of left ankle injury. Her pain is noted to be significantly improved since her last evaluation. The patient presents weightbearing wearing a CAM boot. BRAVO denies any numbness or tingling sensations to the ankle. No other complaints. 	\par \par 10/20/2021: BRAVO KILGORE is a 80 year old female presenting for an initial evaluation of left ankle injury. The patient’s pain is noted to be a 6/10.The patient states that she fell a few days prior due to a misstep injury. The patient went to an outside provider who obtained XR imaging and diagnosed her with a fracture to the lateral aspect of the talus. She was given a brace at the previous evaluation. The patient states that she has been weightbearing since the injury. She  also states that walking exacerbates her pain. The patient presents ambulating with a knee scooter and the brace previously given. She is currently utilizing blood thinners. No other complaints.

## 2021-11-08 NOTE — PHYSICAL EXAM
[de-identified] : General: Alert and oriented x3. In no acute distress. Pleasant in nature with a normal affect. No apparent respiratory distress.\par \par Left Foot and Ankle Exam\par Skin: Clean, dry, intact\par Inspection: No obvious malalignment, no masses, + Improved dorsal foot swelling, no effusion. Pes cavus. \par Pulses: 2+ DP/PT pulses\par ROM: FOOT Full  ROM of digits, ANKLE 10 degrees of dorsiflexion, 40 degrees of plantarflexion, 10 degrees of subtalar motion.\par Painful ROM: None\par Tenderness:  + Improved Tenderness to the lateral fibula. + Improved Tenderness to the medial aspect of the ankle. No tenderness over the medial malleolus, No tenderness over the lateral malleolus, + Improved CFL/ATFL/PTFL pain, no deltoid ligament pain. No heel pain. No Achilles tenderness. No 5th metatarsal pain. No pain to the LisFranc joint. No ttp over the posterior tibial tendon.\par Stability: Negative anterior/posterior drawer.\par Strength: 5/5 ADD/ABD/TA/GS/EHL/FHL/EDL\par Neuro: Sensation in tact to light touch throughout\par Additional tests: Negative Mortons test, negative tarsal tunnel tinels, negative single heel rise.	 [de-identified] : No new imaging was obtained.

## 2021-11-17 RX ORDER — CHLORHEXIDINE GLUCONATE 4 %
325 (65 FE) LIQUID (ML) TOPICAL TWICE DAILY
Refills: 0 | Status: ACTIVE | COMMUNITY

## 2021-11-17 RX ORDER — CLOPIDOGREL BISULFATE 75 MG/1
75 TABLET, FILM COATED ORAL DAILY
Refills: 0 | Status: ACTIVE | COMMUNITY

## 2021-11-17 RX ORDER — ASPIRIN 81 MG
81 TABLET, DELAYED RELEASE (ENTERIC COATED) ORAL DAILY
Refills: 0 | Status: ACTIVE | COMMUNITY

## 2021-11-17 RX ORDER — ATORVASTATIN CALCIUM 40 MG/1
40 TABLET, FILM COATED ORAL
Refills: 0 | Status: ACTIVE | COMMUNITY

## 2021-11-18 ENCOUNTER — NON-APPOINTMENT (OUTPATIENT)
Age: 81
End: 2021-11-18

## 2021-11-18 ENCOUNTER — APPOINTMENT (OUTPATIENT)
Dept: ELECTROPHYSIOLOGY | Facility: CLINIC | Age: 81
End: 2021-11-18
Payer: MEDICARE

## 2021-11-18 VITALS
TEMPERATURE: 98.5 F | HEART RATE: 70 BPM | BODY MASS INDEX: 26.52 KG/M2 | HEIGHT: 66.5 IN | WEIGHT: 167 LBS | RESPIRATION RATE: 17 BRPM | OXYGEN SATURATION: 98 % | SYSTOLIC BLOOD PRESSURE: 124 MMHG | DIASTOLIC BLOOD PRESSURE: 70 MMHG

## 2021-11-18 DIAGNOSIS — Z78.9 OTHER SPECIFIED HEALTH STATUS: ICD-10-CM

## 2021-11-18 PROCEDURE — 93000 ELECTROCARDIOGRAM COMPLETE: CPT

## 2021-11-18 PROCEDURE — 99214 OFFICE O/P EST MOD 30 MIN: CPT

## 2021-11-18 RX ORDER — PANTOPRAZOLE 40 MG/1
40 TABLET, DELAYED RELEASE ORAL DAILY
Refills: 0 | Status: DISCONTINUED | COMMUNITY
End: 2021-11-18

## 2021-11-18 RX ORDER — SENNOSIDES 8.6 MG TABLETS 8.6 MG/1
TABLET ORAL
Refills: 0 | Status: DISCONTINUED | COMMUNITY
End: 2021-11-18

## 2021-11-18 NOTE — DISCUSSION/SUMMARY
[FreeTextEntry1] :  80 year old woman with history of HTN, diverticulitis, CAD s/p PCI (NOBLE to pLAD) presenting for evaluation of paroxysmal atrial fibrillation. She was noted to have suspected pAF several years ago during hospitalization for unrelated dizziness/vertigo. Other than that, she has no known history of AF and denies symptoms of this. If AF is confirmed, she has a CHADSVASc score of 5, and would warrant full anticoagulation. At this time, she is on dual antiplatelet therapy due to CAD and recent PCI, and is also at increased risk of bleeding complications on anticoagulation. I have suggested further monitoring to evaluate for recurrent AF, to guide decision making regarding need for systemic anticoagulation. An implantable loop recorder would be most useful for long-term monitoring, and to clarify the diagnosis as AF has not been noted on recent short-term monitoring (including during hospitalization for complicated PCI). If she does have pAF, anticoagulation vs. intervention for left atrial appendage occlusion could be considered.  \par \par -ILR implant \par -EP follow-up after above \par -continue DAPT for now

## 2021-11-18 NOTE — REVIEW OF SYSTEMS
[Headache] : no headache [SOB] : no shortness of breath [Dyspnea on exertion] : not dyspnea during exertion [Chest Discomfort] : no chest discomfort [Lower Ext Edema] : no extremity edema [Leg Claudication] : no intermittent leg claudication [Palpitations] : palpitations [Syncope] : no syncope [Dizziness] : dizziness [Convulsions] : no convulsions [Negative] : Heme/Lymph

## 2021-11-18 NOTE — CARDIOLOGY SUMMARY
[de-identified] : sinus rhythm 66 bpm [de-identified] : TTE 6/15/21 LVEF 60%, diastolic dysfunction, nml LA size, trivial TR,  [de-identified] : Cath 6/4/21 PCI to pLAD (95% pLAD), otherwise nonobstructive disease

## 2021-11-18 NOTE — HISTORY OF PRESENT ILLNESS
[FreeTextEntry1] : 80 year old woman with history of HTN, diverticulitis, CAD s/p PCI (NOBLE to pLAD) presenting for evaluation of paroxysmal atrial fibrillation. \par \par Several years ago (around 2017) she was in the hospital ([facility]) for severe vertigo. Apparently she was noted to have paroxysmal AF at that time, which terminated spontaneously. She did not require DCCV or other intervention at that time. She was not started on anticoagulation.  \par \par She has had CAD, and in 6/2021 she had chest pain and underwent PCI. The procedure was complicated by right groin hematoma and RP bleed, and she did have bradycardia and hypotension in that setting. \par \par Following her PCI she has remained on DAPT with ASA and Plavix. DOAC medication had been recommended for her history of PAF but she has refused this. She has frequent bruising on her current medication and is concerned about risk of bleeding. She denies history of major bleeding apart from following her PCI.  \par \par ECG Today reveals sinus rhythm 66 bpm with normal intervals. She denies history of palpitation, dizziness or syncope, and denies dyspnea with mild activity.  \par \par Current meds include ASA, Plavix, metoprolol 25mg qd, amlodipine, lisinopril 10 \par \par

## 2021-12-20 ENCOUNTER — APPOINTMENT (OUTPATIENT)
Age: 81
End: 2021-12-20
Payer: MEDICARE

## 2021-12-20 DIAGNOSIS — R22.42 LOCALIZED SWELLING, MASS AND LUMP, LEFT LOWER LIMB: ICD-10-CM

## 2021-12-20 DIAGNOSIS — M25.472 EFFUSION, LEFT ANKLE: ICD-10-CM

## 2021-12-20 DIAGNOSIS — M79.672 PAIN IN LEFT FOOT: ICD-10-CM

## 2021-12-20 PROCEDURE — 99212 OFFICE O/P EST SF 10 MIN: CPT

## 2021-12-20 NOTE — DISCUSSION/SUMMARY
[de-identified] : I explained to the patient has benign ankle swelling.  I told her to be easy with the ankle brace and compression socks as this can lead to increased swelling.  She can elevate and ice at the end of the day.  At this point in time she can follow-up as needed regarding orthopedic standpoint.  If she is concerned about the swelling in her legs, she must visit her PCP.

## 2021-12-20 NOTE — PHYSICAL EXAM
[de-identified] : Physical exam of the left ankle and foot:\par \par No erythema, warmth, rubor.  There is some mild swelling present in the distal foot and ankle with the brace line from her ASO brace.  She has no pain in the ankle and foot.  She has full range of motion of the ankle, full range of motion of the toes.  Dorsalis pedis pulses normal.  Capillary refill in the distal toes is less than 2 seconds.  Neurovascularly intact. [de-identified] : No imaging performed.

## 2022-09-01 ENCOUNTER — OUTPATIENT (OUTPATIENT)
Dept: OUTPATIENT SERVICES | Facility: HOSPITAL | Age: 82
LOS: 1 days | End: 2022-09-01
Payer: COMMERCIAL

## 2022-09-01 DIAGNOSIS — R06.00 DYSPNEA, UNSPECIFIED: ICD-10-CM

## 2022-09-01 PROCEDURE — 93018 CV STRESS TEST I&R ONLY: CPT

## 2022-09-01 PROCEDURE — 78452 HT MUSCLE IMAGE SPECT MULT: CPT

## 2022-09-01 PROCEDURE — 93016 CV STRESS TEST SUPVJ ONLY: CPT

## 2022-09-01 PROCEDURE — 93017 CV STRESS TEST TRACING ONLY: CPT

## 2022-09-01 PROCEDURE — A9500: CPT

## 2022-09-01 PROCEDURE — 78452 HT MUSCLE IMAGE SPECT MULT: CPT | Mod: 26

## 2022-11-16 NOTE — ED ADULT NURSE NOTE - CAS TRG GEN SKIN COLOR
This patient was referred for a preop evaluation prior to a planned left total knee arthroplasty scheduled for 11/28/22.  [facility] but due to his cardiac history his surgery will take place at [facility].  I spoke with him briefly but did not do a complete preop due to the fact that he saw his cardiologist this month, saw his PCP and will see him again this pm. Both of their notes prior to today mention his knee surgery and his cardiologist feels he is ready to undergo it. Those records are from [facility] and are in [facility].  I did order the usual preop bloodwork and we did an EKG as we do not have a tracing to compare with in care everywhere. However the notes from his last EKG in June 2022 are what I can see on paper here .  I will review his labs preop.  The patient is also very comfortable with this plan as well   Latest Reference Range & Units 11/16/22 11:23   Sodium 135 - 145 mmol/L 145   Potassium 3.4 - 5.1 mmol/L 4.4   Chloride 97 - 110 mmol/L 107   CO2 21 - 32 mmol/L 29   ANION GAP 7 - 19 mmol/L 13   Glucose 70 - 99 mg/dL 138 (H)   BUN 6 - 20 mg/dL 22 (H)   Creatinine 0.51 - 0.95 mg/dL 1.11 (H)   BUN/CREATININE RATIO 7 - 25  20   Glomerular Filtration Rate >=60  68   CALCIUM 8.4 - 10.2 mg/dL 9.4   TOTAL BILIRUBIN 0.2 - 1.0 mg/dL 0.5   AST/SGOT <=37 Units/L 16   ALT/SGPT <64 Units/L 23   ALK PHOSPHATASE 45 - 117 Units/L 72   Albumin 3.6 - 5.1 g/dL 3.6   GLOBULIN 2.0 - 4.0 g/dL 2.8   A/G Ratio, Serum 1.0 - 2.4  1.3   TOTAL PROTEIN 6.4 - 8.2 g/dL 6.4   WBC 4.2 - 11.0 K/mcL 8.8   RBC 4.00 - 5.20 mil/mcL 4.09   HGB 12.0 - 17.0 g/dL 13.1   HCT 36.0 - 51.0 % 39.1   MCV 78.0 - 100.0 fl 95.6   MCH 26.0 - 34.0 pg 32.0   MCHC 32.0 - 36.5 g/dL 33.5   RDW-SD 39.0 - 50.0 fL 49.1   RDW-CV 11.0 - 15.0 % 14.1    - 450 K/mcL 261   NRBC <=0 /100 WBC 0   Neutrophil % 65   LYMPH % 24   MONO % 8   EOSIN % 2   BASO % 1   Absolute Neutrophil 1.8 - 7.7 K/mcL 5.7    Absolute Lymph 1.0 - 4.0 K/mcL 2.1   Absolute Mono 0.3 - 0.9 K/mcL 0.7   Absolute Eos 0.0 - 0.5 K/mcL 0.2   Absolute Baso 0.0 - 0.3 K/mcL 0.1   Immature Granulocytes % 0   Absolute Immature Granulocytes 0.0 - 0.2 K/mcL 0.0   (H): Data is abnormally high          Electrocardiogram 12-Lead  Order: [id number]   Status: Preliminary result       Visible to patient: No (not released)       Next appt: 11/25/2022 at 08:00 AM in Laboratory ( ACL PRE-PROCEDURE TESTING)       0 Result Notes      Component 11:22    Ventricular Rate EKG/Min (BPM) 83 P    Atrial Rate (BPM) 83 P    ID-Interval (MSEC) 182 P    QRS-Interval (MSEC) 124 P    QT-Interval (MSEC) 384 P    QTc 451 P    P Axis (Degrees) 66 P    R Axis (Degrees) -25 P    T Axis (Degrees) 125 P    REPORT TEXT Normal sinus rhythm   Left ventricular hypertrophy   with QRS widening and repolarization abnormality   (   R in aVL   ,   Crawford product   )   Abnormal ECG             Latest Reference Range & Units 11/16/22 12:14   COLOR  Yellow   CLARITY  Clear   SPECIFIC GRAVITY 1.005 - 1.030  1.020   pH 5.0 - 7.0  6.0   GLUCOSE(URINE) Negative mg/dL >1000 !   KETONES Negative mg/dL Negative   PROTEIN(URINE) Negative mg/dL Negative   BLOOD Negative  Trace !   ERYTHROCYTES, URINALYSIS None Seen, 1 to 2 /hpf 3 to 5 !   LEUKOCYTES, URINALYSIS None Seen, 1 to 5 /hpf 1 to 5   LEUKOCYTE ESTERASE Negative  Negative   NITRITE Negative  Negative   BILIRUBIN Negative  Negative   UROBILINOGEN 0.2, 1.0 mg/dL 0.2   BACTERIA, URINALYSIS None Seen /hpf Few !   HYALINE CASTS, URINALYSIS None Seen, 1 to 5 /lpf 1 to 5   Squamous EPI'S None Seen, 1 to 5 /hpf None Seen   MUCOUS  Present   !: Data is abnormal culture pending   Latest Reference Range & Units 11/16/22 12:13   S Aureus PCR Not Detected  Not Detected   STAPHYLOCOCCUS AUREUS METHICILLIN SENSITIVE (MSSA) AND METHICILLIN RESISTANT (MRSA) PCR  Rpt   S AUREUS/MRSA PCR Not Detected  Not Detected   Rpt: View report in Results Review for more  information         0 Result Notes    REFLEXIVE URINE CULTURE <10,000 CFU/mL Gram Positive Debbie Abnormal           Testing on this culture has been completed.  If additional testing is required, please contact the microbiology laboratory within 48 hours.        Resulting Agency: [facility]     At <10,000 colonies this is not significant   Normal for race

## 2024-02-09 NOTE — PHYSICAL THERAPY INITIAL EVALUATION ADULT - PATIENT/FAMILY/SIGNIFICANT OTHER GOALS STATEMENT, PT EVAL
Take antibiotic as prescribed  Take steroid as prescribed  Continue to use inhalers as needed/prescribed  Plenty of fluids  Can use honey   Cool mist humidifier   Warm gargle with salt water for sore throat   Use Tylenol as needed for fever or pain    Follow up with PCP in 3-5 days.  Proceed to  ER if symptoms worsen.  
"go home"

## 2024-11-10 NOTE — DISCHARGE NOTE ADULT - NS AS DC FOLLOWUP STROKE INST
09-Nov-2024 12:49
08-Nov-2024 18:17
10-Nov-2024 10:46
09-Nov-2024 13:21
10-Nov-2024 06:21
Stroke (includes: TIA/SAH/ICH/Ischemic Stroke)
09-Nov-2024 10:25

## 2024-12-27 NOTE — DISCHARGE NOTE ADULT - CARE PROVIDERS DIRECT ADDRESSES
38.2
,[email or web address]

## 2025-02-27 NOTE — PATIENT PROFILE ADULT. - VISION (WITH CORRECTIVE LENSES IF THE PATIENT USUALLY WEARS THEM):
[x] [facility]  Outpatient Rehabilitation &  Therapy  [address]  [phone]  [phone] [] [facility]  Outpatient Rehabilitation &  Therapy  [address]  P: [phone]  F: [phone] [] [facility]  Outpatient Rehabilitation &  Therapy  [address]  P: [phone]  F: [phone] [] [facility]  Outpatient Rehabilitation &  Therapy  [address]  [phone]  [phone] [] [facility]  Outpatient Rehabilitation &  Therapy  [address]   P: [phone]  F: [phone]  [] [facility]  Outpatient Rehabilitation &  Therapy  [address]  P: [phone]  F: [phone] [] [facility]  Outpatient Rehabilitation &  Therapy  [address]  P: [phone]  F: [phone] [] [facility]  Outpatient Rehabilitation &  Therapy  [address]  P: [phone]  F: [phone] [] [facility]  Outpatient Rehabilitation &  Therapy  [address]  P: [phone]  F: [phone]  [] [facility] Outpatient Rehabilitation &  Therapy  [address]  P: [phone]  F: [phone]     Physical Therapy Daily Treatment Note    Date:  2025  Patient Name:  Yo Clark    :  1964  MRN: [id number]  Physician: Brynn Braun, AMALIA - TANVI                             Insurance: [employer] ANTH[employer] PPO (PT IS BASED ON MEDICAL NECESSITY PER LEIGHA YR )  Medical Diagnosis: Spinal stenosis of lumbar region with radiculopathy (M48.061,M54.16)       Rehab Codes: M54.59, M25.551, M79.651, R26.89, R26.81, R29.3, M62.551, M62.552, M62.561  Onset Date: 2024                      Next 's appt.: 3/12 PCP, 3/18 Pn Mgmt,  Neuro  Visit# / total visits:      Cancels/No Shows: 1/0    Subjective:    Pain:  [x] Yes  [] No  Location:  Normal vision: sees adequately in most situations; can see medication labels, newsprint

## 2025-06-20 NOTE — ED PROVIDER NOTE - NSCAREINITIATED _GEN_ER
Father would like to check with his Cardiologist if his group would see Aggie. Advised him to contact us if not or if long wait time and we will help set up an appointment.   Grady Guidry(Attending)

## 2025-07-21 ENCOUNTER — APPOINTMENT (OUTPATIENT)
Dept: UROGYNECOLOGY | Facility: CLINIC | Age: 85
End: 2025-07-21
Payer: MEDICARE

## 2025-07-21 VITALS
HEART RATE: 76 BPM | BODY MASS INDEX: 25.71 KG/M2 | WEIGHT: 160 LBS | HEIGHT: 66 IN | DIASTOLIC BLOOD PRESSURE: 72 MMHG | SYSTOLIC BLOOD PRESSURE: 116 MMHG

## 2025-07-21 DIAGNOSIS — R33.9 RETENTION OF URINE, UNSPECIFIED: ICD-10-CM

## 2025-07-21 DIAGNOSIS — K57.90 DIVERTICULOSIS OF INTESTINE, PART UNSPECIFIED, W/OUT PERFORATION OR ABSCESS W/OUT BLEEDING: ICD-10-CM

## 2025-07-21 DIAGNOSIS — Z78.9 OTHER SPECIFIED HEALTH STATUS: ICD-10-CM

## 2025-07-21 DIAGNOSIS — Z63.4 DISAPPEARANCE AND DEATH OF FAMILY MEMBER: ICD-10-CM

## 2025-07-21 DIAGNOSIS — N81.9 FEMALE GENITAL PROLAPSE, UNSPECIFIED: ICD-10-CM

## 2025-07-21 LAB
BILIRUB UR QL STRIP: NEGATIVE
CLARITY UR: CLEAR
COLLECTION METHOD: NORMAL
GLUCOSE UR-MCNC: NEGATIVE
HCG UR QL: 0.2
HGB UR QL STRIP.AUTO: NORMAL
KETONES UR-MCNC: NEGATIVE
LEUKOCYTE ESTERASE UR QL STRIP: NEGATIVE
NITRITE UR QL STRIP: NEGATIVE
PH UR STRIP: 5.5
PROT UR STRIP-MCNC: NEGATIVE
SP GR UR STRIP: 1.02

## 2025-07-21 PROCEDURE — 99459 PELVIC EXAMINATION: CPT

## 2025-07-21 PROCEDURE — 81003 URINALYSIS AUTO W/O SCOPE: CPT | Mod: QW

## 2025-07-21 PROCEDURE — 99204 OFFICE O/P NEW MOD 45 MIN: CPT | Mod: 25

## 2025-07-21 RX ORDER — METOPROLOL TARTRATE 25 MG/1
25 TABLET ORAL
Refills: 0 | Status: ACTIVE | COMMUNITY

## 2025-07-21 SDOH — SOCIAL STABILITY - SOCIAL INSECURITY: DISSAPEARANCE AND DEATH OF FAMILY MEMBER: Z63.4

## 2025-07-24 LAB
APPEARANCE: CLEAR
BACTERIA UR CULT: NORMAL
BACTERIA: NEGATIVE /HPF
BILIRUBIN URINE: NEGATIVE
BLOOD URINE: NEGATIVE
CAST: 2 /LPF
COLOR: YELLOW
EPITHELIAL CELLS: 0 /HPF
GLUCOSE QUALITATIVE U: NEGATIVE MG/DL
HYALINE CASTS: PRESENT
KETONES URINE: NEGATIVE MG/DL
LEUKOCYTE ESTERASE URINE: NEGATIVE
MICROSCOPIC-UA: NORMAL
NITRITE URINE: NEGATIVE
PH URINE: 6
PROTEIN URINE: NEGATIVE MG/DL
RED BLOOD CELLS URINE: 1 /HPF
REVIEW: NORMAL
SPECIFIC GRAVITY URINE: 1.02
UROBILINOGEN URINE: 0.2 MG/DL
WHITE BLOOD CELLS URINE: 0 /HPF

## 2025-08-12 ENCOUNTER — APPOINTMENT (OUTPATIENT)
Dept: UROGYNECOLOGY | Facility: CLINIC | Age: 85
End: 2025-08-12
Payer: MEDICARE

## 2025-08-12 ENCOUNTER — APPOINTMENT (OUTPATIENT)
Dept: OBGYN | Facility: CLINIC | Age: 85
End: 2025-08-12

## 2025-08-12 VITALS — DIASTOLIC BLOOD PRESSURE: 78 MMHG | SYSTOLIC BLOOD PRESSURE: 146 MMHG | HEART RATE: 61 BPM

## 2025-08-12 DIAGNOSIS — R30.0 DYSURIA: ICD-10-CM

## 2025-08-12 LAB
BILIRUB UR QL STRIP: NEGATIVE
CLARITY UR: NORMAL
COLLECTION METHOD: NORMAL
GLUCOSE UR-MCNC: NEGATIVE
HCG UR QL: 0.2 EU/DL
HGB UR QL STRIP.AUTO: NORMAL
KETONES UR-MCNC: NEGATIVE
LEUKOCYTE ESTERASE UR QL STRIP: NORMAL
NITRITE UR QL STRIP: POSITIVE
PH UR STRIP: 5.5
PROT UR STRIP-MCNC: NEGATIVE
SP GR UR STRIP: 1.01

## 2025-08-12 PROCEDURE — 81003 URINALYSIS AUTO W/O SCOPE: CPT | Mod: QW

## 2025-08-12 PROCEDURE — 99213 OFFICE O/P EST LOW 20 MIN: CPT | Mod: 25

## 2025-08-12 PROCEDURE — 51701 INSERT BLADDER CATHETER: CPT

## 2025-08-12 RX ORDER — SULFAMETHOXAZOLE AND TRIMETHOPRIM 800; 160 MG/1; MG/1
800-160 TABLET ORAL TWICE DAILY
Qty: 10 | Refills: 0 | Status: ACTIVE | COMMUNITY
Start: 2025-08-12 | End: 1900-01-01

## 2025-08-13 LAB
APPEARANCE: ABNORMAL
BACTERIA: ABNORMAL /HPF
BILIRUBIN URINE: NEGATIVE
BLOOD URINE: NEGATIVE
CAST: 1 /LPF
COLOR: YELLOW
EPITHELIAL CELLS: 0 /HPF
GLUCOSE QUALITATIVE U: NEGATIVE MG/DL
KETONES URINE: NEGATIVE MG/DL
LEUKOCYTE ESTERASE URINE: ABNORMAL
MICROSCOPIC-UA: NORMAL
NITRITE URINE: NEGATIVE
PH URINE: 6
PROTEIN URINE: NEGATIVE MG/DL
RED BLOOD CELLS URINE: 1 /HPF
REVIEW: NORMAL
SPECIFIC GRAVITY URINE: 1.02
UROBILINOGEN URINE: 0.2 MG/DL
WHITE BLOOD CELLS URINE: 333 /HPF

## 2025-08-14 ENCOUNTER — APPOINTMENT (OUTPATIENT)
Dept: UROGYNECOLOGY | Facility: CLINIC | Age: 85
End: 2025-08-14

## 2025-08-14 ENCOUNTER — OUTPATIENT (OUTPATIENT)
Dept: OUTPATIENT SERVICES | Facility: HOSPITAL | Age: 85
LOS: 1 days | End: 2025-08-14
Payer: MEDICARE

## 2025-08-14 VITALS
TEMPERATURE: 97 F | HEART RATE: 78 BPM | SYSTOLIC BLOOD PRESSURE: 128 MMHG | OXYGEN SATURATION: 96 % | HEIGHT: 66 IN | WEIGHT: 152.12 LBS | DIASTOLIC BLOOD PRESSURE: 66 MMHG | RESPIRATION RATE: 18 BRPM

## 2025-08-14 DIAGNOSIS — Z01.818 ENCOUNTER FOR OTHER PREPROCEDURAL EXAMINATION: ICD-10-CM

## 2025-08-14 DIAGNOSIS — I10 ESSENTIAL (PRIMARY) HYPERTENSION: ICD-10-CM

## 2025-08-14 DIAGNOSIS — N81.9 FEMALE GENITAL PROLAPSE, UNSPECIFIED: ICD-10-CM

## 2025-08-14 DIAGNOSIS — N39.3 STRESS INCONTINENCE (FEMALE) (MALE): ICD-10-CM

## 2025-08-14 DIAGNOSIS — Z95.5 PRESENCE OF CORONARY ANGIOPLASTY IMPLANT AND GRAFT: ICD-10-CM

## 2025-08-14 DIAGNOSIS — Z91.89 OTHER SPECIFIED PERSONAL RISK FACTORS, NOT ELSEWHERE CLASSIFIED: ICD-10-CM

## 2025-08-14 DIAGNOSIS — I48.0 PAROXYSMAL ATRIAL FIBRILLATION: ICD-10-CM

## 2025-08-14 LAB
A1C WITH ESTIMATED AVERAGE GLUCOSE RESULT: 6 % — HIGH (ref 4–5.6)
ALBUMIN SERPL ELPH-MCNC: 4.1 G/DL — SIGNIFICANT CHANGE UP (ref 3.3–5.2)
ALP SERPL-CCNC: 98 U/L — SIGNIFICANT CHANGE UP (ref 40–120)
ALT FLD-CCNC: 12 U/L — SIGNIFICANT CHANGE UP
ANION GAP SERPL CALC-SCNC: 15 MMOL/L — SIGNIFICANT CHANGE UP (ref 5–17)
APTT BLD: 27.7 SEC — SIGNIFICANT CHANGE UP (ref 26.1–36.8)
AST SERPL-CCNC: 18 U/L — SIGNIFICANT CHANGE UP
BASOPHILS # BLD AUTO: 0.06 K/UL — SIGNIFICANT CHANGE UP (ref 0–0.2)
BASOPHILS NFR BLD AUTO: 0.7 % — SIGNIFICANT CHANGE UP (ref 0–2)
BILIRUB SERPL-MCNC: 0.3 MG/DL — LOW (ref 0.4–2)
BLD GP AB SCN SERPL QL: SIGNIFICANT CHANGE UP
BUN SERPL-MCNC: 30.8 MG/DL — HIGH (ref 8–20)
CALCIUM SERPL-MCNC: 9.4 MG/DL — SIGNIFICANT CHANGE UP (ref 8.4–10.5)
CHLORIDE SERPL-SCNC: 101 MMOL/L — SIGNIFICANT CHANGE UP (ref 96–108)
CO2 SERPL-SCNC: 19 MMOL/L — LOW (ref 22–29)
CREAT SERPL-MCNC: 1.44 MG/DL — HIGH (ref 0.5–1.3)
EGFR: 36 ML/MIN/1.73M2 — LOW
EGFR: 36 ML/MIN/1.73M2 — LOW
EOSINOPHIL # BLD AUTO: 0.29 K/UL — SIGNIFICANT CHANGE UP (ref 0–0.5)
EOSINOPHIL NFR BLD AUTO: 3.3 % — SIGNIFICANT CHANGE UP (ref 0–6)
ESTIMATED AVERAGE GLUCOSE: 126 MG/DL — HIGH (ref 68–114)
GLUCOSE SERPL-MCNC: 85 MG/DL — SIGNIFICANT CHANGE UP (ref 70–99)
HCT VFR BLD CALC: 34.2 % — LOW (ref 34.5–45)
HGB BLD-MCNC: 11.1 G/DL — LOW (ref 11.5–15.5)
IMM GRANULOCYTES # BLD AUTO: 0.03 K/UL — SIGNIFICANT CHANGE UP (ref 0–0.07)
IMM GRANULOCYTES NFR BLD AUTO: 0.3 % — SIGNIFICANT CHANGE UP (ref 0–0.9)
INR BLD: 0.96 RATIO — SIGNIFICANT CHANGE UP (ref 0.85–1.16)
LYMPHOCYTES # BLD AUTO: 1.35 K/UL — SIGNIFICANT CHANGE UP (ref 1–3.3)
LYMPHOCYTES NFR BLD AUTO: 15.4 % — SIGNIFICANT CHANGE UP (ref 13–44)
MCHC RBC-ENTMCNC: 29.8 PG — SIGNIFICANT CHANGE UP (ref 27–34)
MCHC RBC-ENTMCNC: 32.5 G/DL — SIGNIFICANT CHANGE UP (ref 32–36)
MCV RBC AUTO: 91.7 FL — SIGNIFICANT CHANGE UP (ref 80–100)
MONOCYTES # BLD AUTO: 0.78 K/UL — SIGNIFICANT CHANGE UP (ref 0–0.9)
MONOCYTES NFR BLD AUTO: 8.9 % — SIGNIFICANT CHANGE UP (ref 2–14)
NEUTROPHILS # BLD AUTO: 6.26 K/UL — SIGNIFICANT CHANGE UP (ref 1.8–7.4)
NEUTROPHILS NFR BLD AUTO: 71.4 % — SIGNIFICANT CHANGE UP (ref 43–77)
NRBC # BLD AUTO: 0 K/UL — SIGNIFICANT CHANGE UP (ref 0–0)
NRBC # FLD: 0 K/UL — SIGNIFICANT CHANGE UP (ref 0–0)
NRBC BLD AUTO-RTO: 0 /100 WBCS — SIGNIFICANT CHANGE UP (ref 0–0)
PLATELET # BLD AUTO: 281 K/UL — SIGNIFICANT CHANGE UP (ref 150–400)
PMV BLD: 11.3 FL — SIGNIFICANT CHANGE UP (ref 7–13)
POTASSIUM SERPL-MCNC: 5.2 MMOL/L — SIGNIFICANT CHANGE UP (ref 3.5–5.3)
POTASSIUM SERPL-SCNC: 5.2 MMOL/L — SIGNIFICANT CHANGE UP (ref 3.5–5.3)
PROT SERPL-MCNC: 6.7 G/DL — SIGNIFICANT CHANGE UP (ref 6.6–8.7)
PROTHROM AB SERPL-ACNC: 10.8 SEC — SIGNIFICANT CHANGE UP (ref 9.9–13.4)
RBC # BLD: 3.73 M/UL — LOW (ref 3.8–5.2)
RBC # FLD: 13 % — SIGNIFICANT CHANGE UP (ref 10.3–14.5)
SODIUM SERPL-SCNC: 135 MMOL/L — SIGNIFICANT CHANGE UP (ref 135–145)
WBC # BLD: 8.77 K/UL — SIGNIFICANT CHANGE UP (ref 3.8–10.5)
WBC # FLD AUTO: 8.77 K/UL — SIGNIFICANT CHANGE UP (ref 3.8–10.5)

## 2025-08-14 PROCEDURE — 86850 RBC ANTIBODY SCREEN: CPT

## 2025-08-14 PROCEDURE — 36415 COLL VENOUS BLD VENIPUNCTURE: CPT

## 2025-08-14 PROCEDURE — 83036 HEMOGLOBIN GLYCOSYLATED A1C: CPT

## 2025-08-14 PROCEDURE — 85025 COMPLETE CBC W/AUTO DIFF WBC: CPT

## 2025-08-14 PROCEDURE — 86900 BLOOD TYPING SEROLOGIC ABO: CPT

## 2025-08-14 PROCEDURE — 93010 ELECTROCARDIOGRAM REPORT: CPT

## 2025-08-14 PROCEDURE — G0463: CPT

## 2025-08-14 PROCEDURE — 93005 ELECTROCARDIOGRAM TRACING: CPT

## 2025-08-14 PROCEDURE — 85730 THROMBOPLASTIN TIME PARTIAL: CPT

## 2025-08-14 PROCEDURE — 86901 BLOOD TYPING SEROLOGIC RH(D): CPT

## 2025-08-14 PROCEDURE — 80053 COMPREHEN METABOLIC PANEL: CPT

## 2025-08-14 PROCEDURE — 85610 PROTHROMBIN TIME: CPT

## 2025-08-19 ENCOUNTER — APPOINTMENT (OUTPATIENT)
Dept: UROGYNECOLOGY | Facility: CLINIC | Age: 85
End: 2025-08-19
Payer: MEDICARE

## 2025-08-19 VITALS
SYSTOLIC BLOOD PRESSURE: 138 MMHG | HEART RATE: 60 BPM | WEIGHT: 160 LBS | BODY MASS INDEX: 25.71 KG/M2 | HEIGHT: 66 IN | DIASTOLIC BLOOD PRESSURE: 78 MMHG

## 2025-08-19 PROCEDURE — 51741 ELECTRO-UROFLOWMETRY FIRST: CPT

## 2025-08-19 PROCEDURE — 51729 CYSTOMETROGRAM W/VP&UP: CPT

## 2025-08-19 PROCEDURE — 51784 ANAL/URINARY MUSCLE STUDY: CPT

## 2025-08-19 PROCEDURE — 51797 INTRAABDOMINAL PRESSURE TEST: CPT

## 2025-09-02 ENCOUNTER — APPOINTMENT (OUTPATIENT)
Age: 85
End: 2025-09-02
Payer: MEDICARE

## 2025-09-02 DIAGNOSIS — N39.3 STRESS INCONTINENCE (FEMALE) (MALE): ICD-10-CM

## 2025-09-02 DIAGNOSIS — N81.9 FEMALE GENITAL PROLAPSE, UNSPECIFIED: ICD-10-CM

## 2025-09-02 PROCEDURE — 99214 OFFICE O/P EST MOD 30 MIN: CPT

## 2025-09-03 ENCOUNTER — APPOINTMENT (OUTPATIENT)
Dept: UROGYNECOLOGY | Facility: HOSPITAL | Age: 85
End: 2025-09-03

## 2025-09-03 ENCOUNTER — INPATIENT (INPATIENT)
Facility: HOSPITAL | Age: 85
LOS: 0 days | Discharge: HOME | DRG: 761 | End: 2025-09-04
Attending: OBSTETRICS & GYNECOLOGY | Admitting: OBSTETRICS & GYNECOLOGY
Payer: MEDICARE

## 2025-09-03 VITALS
TEMPERATURE: 98 F | RESPIRATION RATE: 15 BRPM | OXYGEN SATURATION: 99 % | SYSTOLIC BLOOD PRESSURE: 166 MMHG | HEART RATE: 64 BPM | HEIGHT: 66 IN | DIASTOLIC BLOOD PRESSURE: 65 MMHG | WEIGHT: 152.12 LBS

## 2025-09-03 DIAGNOSIS — N81.9 FEMALE GENITAL PROLAPSE, UNSPECIFIED: ICD-10-CM

## 2025-09-03 DIAGNOSIS — N39.3 STRESS INCONTINENCE (FEMALE) (MALE): ICD-10-CM

## 2025-09-03 DEVICE — SLING GYNECARE TVT EXACT: Type: IMPLANTABLE DEVICE | Status: FUNCTIONAL

## 2025-09-03 DEVICE — URETERAL CATH OPEN END 5FR 70CM: Type: IMPLANTABLE DEVICE | Status: FUNCTIONAL

## 2025-09-03 RX ORDER — KETOROLAC TROMETHAMINE 30 MG/ML
15 INJECTION, SOLUTION INTRAMUSCULAR; INTRAVENOUS ONCE
Refills: 0 | Status: DISCONTINUED | OUTPATIENT
Start: 2025-09-03 | End: 2025-09-03

## 2025-09-03 RX ORDER — LISINOPRIL 5 MG/1
10 TABLET ORAL DAILY
Refills: 0 | Status: DISCONTINUED | OUTPATIENT
Start: 2025-09-04 | End: 2025-09-04

## 2025-09-03 RX ORDER — GABAPENTIN 400 MG/1
300 CAPSULE ORAL EVERY 8 HOURS
Refills: 0 | Status: DISCONTINUED | OUTPATIENT
Start: 2025-09-03 | End: 2025-09-04

## 2025-09-03 RX ORDER — SIMETHICONE 80 MG
80 TABLET,CHEWABLE ORAL EVERY 6 HOURS
Refills: 0 | Status: DISCONTINUED | OUTPATIENT
Start: 2025-09-03 | End: 2025-09-04

## 2025-09-03 RX ORDER — POLYETHYLENE GLYCOL 3350 17 G/17G
17 POWDER, FOR SOLUTION ORAL AT BEDTIME
Refills: 0 | Status: DISCONTINUED | OUTPATIENT
Start: 2025-09-03 | End: 2025-09-04

## 2025-09-03 RX ORDER — METOPROLOL SUCCINATE 50 MG/1
25 TABLET, EXTENDED RELEASE ORAL
Refills: 0 | Status: DISCONTINUED | OUTPATIENT
Start: 2025-09-03 | End: 2025-09-04

## 2025-09-03 RX ORDER — ACETAMINOPHEN 500 MG/5ML
975 LIQUID (ML) ORAL ONCE
Refills: 0 | Status: COMPLETED | OUTPATIENT
Start: 2025-09-03 | End: 2025-09-03

## 2025-09-03 RX ORDER — ONDANSETRON HCL/PF 4 MG/2 ML
4 VIAL (ML) INJECTION ONCE
Refills: 0 | Status: DISCONTINUED | OUTPATIENT
Start: 2025-09-03 | End: 2025-09-03

## 2025-09-03 RX ORDER — FENTANYL CITRATE-0.9 % NACL/PF 100MCG/2ML
50 SYRINGE (ML) INTRAVENOUS
Refills: 0 | Status: DISCONTINUED | OUTPATIENT
Start: 2025-09-03 | End: 2025-09-03

## 2025-09-03 RX ORDER — ONDANSETRON HCL/PF 4 MG/2 ML
4 VIAL (ML) INJECTION ONCE
Refills: 0 | Status: DISCONTINUED | OUTPATIENT
Start: 2025-09-03 | End: 2025-09-04

## 2025-09-03 RX ORDER — LABETALOL HYDROCHLORIDE 200 MG/1
10 TABLET, FILM COATED ORAL
Refills: 0 | Status: COMPLETED | OUTPATIENT
Start: 2025-09-03 | End: 2025-09-03

## 2025-09-03 RX ORDER — ACETAMINOPHEN 500 MG/5ML
975 LIQUID (ML) ORAL EVERY 6 HOURS
Refills: 0 | Status: DISCONTINUED | OUTPATIENT
Start: 2025-09-03 | End: 2025-09-04

## 2025-09-03 RX ORDER — CEFAZOLIN SODIUM IN 0.9 % NACL 3 G/100 ML
2000 INTRAVENOUS SOLUTION, PIGGYBACK (ML) INTRAVENOUS ONCE
Refills: 0 | Status: DISCONTINUED | OUTPATIENT
Start: 2025-09-03 | End: 2025-09-03

## 2025-09-03 RX ORDER — CALCITRIOL 0.5 UG/1
0 CAPSULE, GELATIN COATED ORAL
Refills: 0 | DISCHARGE

## 2025-09-03 RX ORDER — SODIUM CHLORIDE 9 G/1000ML
1000 INJECTION, SOLUTION INTRAVENOUS
Refills: 0 | Status: DISCONTINUED | OUTPATIENT
Start: 2025-09-03 | End: 2025-09-03

## 2025-09-03 RX ORDER — HYDROMORPHONE/SOD CHLOR,ISO/PF 2 MG/10 ML
0.5 SYRINGE (ML) INJECTION
Refills: 0 | Status: DISCONTINUED | OUTPATIENT
Start: 2025-09-03 | End: 2025-09-03

## 2025-09-03 RX ORDER — HEPARIN SODIUM 1000 [USP'U]/ML
5000 INJECTION INTRAVENOUS; SUBCUTANEOUS EVERY 12 HOURS
Refills: 0 | Status: DISCONTINUED | OUTPATIENT
Start: 2025-09-04 | End: 2025-09-04

## 2025-09-03 RX ORDER — ONDANSETRON HCL/PF 4 MG/2 ML
4 VIAL (ML) INJECTION ONCE
Refills: 0 | Status: COMPLETED | OUTPATIENT
Start: 2025-09-03 | End: 2025-09-03

## 2025-09-03 RX ORDER — FENTANYL CITRATE-0.9 % NACL/PF 100MCG/2ML
25 SYRINGE (ML) INTRAVENOUS
Refills: 0 | Status: DISCONTINUED | OUTPATIENT
Start: 2025-09-03 | End: 2025-09-03

## 2025-09-03 RX ORDER — ONDANSETRON HCL/PF 4 MG/2 ML
8 VIAL (ML) INJECTION EVERY 8 HOURS
Refills: 0 | Status: DISCONTINUED | OUTPATIENT
Start: 2025-09-03 | End: 2025-09-04

## 2025-09-03 RX ORDER — CELECOXIB 50 MG/1
200 CAPSULE ORAL ONCE
Refills: 0 | Status: COMPLETED | OUTPATIENT
Start: 2025-09-03 | End: 2025-09-03

## 2025-09-03 RX ORDER — AMLODIPINE BESYLATE 10 MG/1
5 TABLET ORAL DAILY
Refills: 0 | Status: DISCONTINUED | OUTPATIENT
Start: 2025-09-03 | End: 2025-09-04

## 2025-09-03 RX ORDER — SODIUM CHLORIDE 9 G/1000ML
1000 INJECTION, SOLUTION INTRAVENOUS
Refills: 0 | Status: DISCONTINUED | OUTPATIENT
Start: 2025-09-03 | End: 2025-09-04

## 2025-09-03 RX ORDER — METOCLOPRAMIDE HCL 10 MG
10 TABLET ORAL ONCE
Refills: 0 | Status: DISCONTINUED | OUTPATIENT
Start: 2025-09-03 | End: 2025-09-03

## 2025-09-03 RX ORDER — ACETAMINOPHEN 500 MG/5ML
1000 LIQUID (ML) ORAL ONCE
Refills: 0 | Status: DISCONTINUED | OUTPATIENT
Start: 2025-09-03 | End: 2025-09-03

## 2025-09-03 RX ADMIN — Medication 975 MILLIGRAM(S): at 21:07

## 2025-09-03 RX ADMIN — Medication 975 MILLIGRAM(S): at 14:32

## 2025-09-03 RX ADMIN — Medication 50 MICROGRAM(S): at 17:00

## 2025-09-03 RX ADMIN — Medication 975 MILLIGRAM(S): at 20:07

## 2025-09-03 RX ADMIN — Medication 0.5 MILLIGRAM(S): at 16:40

## 2025-09-03 RX ADMIN — Medication 4 MILLIGRAM(S): at 18:55

## 2025-09-03 RX ADMIN — SODIUM CHLORIDE 100 MILLILITER(S): 9 INJECTION, SOLUTION INTRAVENOUS at 16:41

## 2025-09-03 RX ADMIN — Medication 0.5 MILLIGRAM(S): at 16:50

## 2025-09-03 RX ADMIN — LABETALOL HYDROCHLORIDE 10 MILLIGRAM(S): 200 TABLET, FILM COATED ORAL at 17:30

## 2025-09-03 RX ADMIN — Medication 8 MILLIGRAM(S): at 20:07

## 2025-09-03 RX ADMIN — Medication 0.5 MILLIGRAM(S): at 17:10

## 2025-09-03 RX ADMIN — Medication 50 MICROGRAM(S): at 17:10

## 2025-09-03 RX ADMIN — Medication 50 MICROGRAM(S): at 17:30

## 2025-09-03 RX ADMIN — LABETALOL HYDROCHLORIDE 10 MILLIGRAM(S): 200 TABLET, FILM COATED ORAL at 17:00

## 2025-09-03 RX ADMIN — Medication 50 MICROGRAM(S): at 16:45

## 2025-09-03 RX ADMIN — CELECOXIB 200 MILLIGRAM(S): 50 CAPSULE ORAL at 14:32

## 2025-09-03 RX ADMIN — Medication 0.5 MILLIGRAM(S): at 16:30

## 2025-09-04 ENCOUNTER — TRANSCRIPTION ENCOUNTER (OUTPATIENT)
Age: 85
End: 2025-09-04

## 2025-09-04 VITALS
RESPIRATION RATE: 16 BRPM | DIASTOLIC BLOOD PRESSURE: 66 MMHG | OXYGEN SATURATION: 96 % | HEART RATE: 57 BPM | SYSTOLIC BLOOD PRESSURE: 127 MMHG | TEMPERATURE: 98 F

## 2025-09-04 LAB
ANION GAP SERPL CALC-SCNC: 12 MMOL/L — SIGNIFICANT CHANGE UP (ref 5–17)
BUN SERPL-MCNC: 22.1 MG/DL — HIGH (ref 8–20)
CALCIUM SERPL-MCNC: 8.7 MG/DL — SIGNIFICANT CHANGE UP (ref 8.4–10.5)
CHLORIDE SERPL-SCNC: 105 MMOL/L — SIGNIFICANT CHANGE UP (ref 96–108)
CO2 SERPL-SCNC: 21 MMOL/L — LOW (ref 22–29)
CREAT SERPL-MCNC: 0.85 MG/DL — SIGNIFICANT CHANGE UP (ref 0.5–1.3)
EGFR: 67 ML/MIN/1.73M2 — SIGNIFICANT CHANGE UP
EGFR: 67 ML/MIN/1.73M2 — SIGNIFICANT CHANGE UP
GLUCOSE SERPL-MCNC: 135 MG/DL — HIGH (ref 70–99)
HCT VFR BLD CALC: 29 % — LOW (ref 34.5–45)
HGB BLD-MCNC: 9.8 G/DL — LOW (ref 11.5–15.5)
MCHC RBC-ENTMCNC: 30.3 PG — SIGNIFICANT CHANGE UP (ref 27–34)
MCHC RBC-ENTMCNC: 33.8 G/DL — SIGNIFICANT CHANGE UP (ref 32–36)
MCV RBC AUTO: 89.8 FL — SIGNIFICANT CHANGE UP (ref 80–100)
NRBC # BLD AUTO: 0 K/UL — SIGNIFICANT CHANGE UP (ref 0–0)
NRBC # FLD: 0 K/UL — SIGNIFICANT CHANGE UP (ref 0–0)
NRBC BLD AUTO-RTO: 0 /100 WBCS — SIGNIFICANT CHANGE UP (ref 0–0)
PLATELET # BLD AUTO: 253 K/UL — SIGNIFICANT CHANGE UP (ref 150–400)
PMV BLD: 10.1 FL — SIGNIFICANT CHANGE UP (ref 7–13)
POTASSIUM SERPL-MCNC: 4.7 MMOL/L — SIGNIFICANT CHANGE UP (ref 3.5–5.3)
POTASSIUM SERPL-SCNC: 4.7 MMOL/L — SIGNIFICANT CHANGE UP (ref 3.5–5.3)
RBC # BLD: 3.23 M/UL — LOW (ref 3.8–5.2)
RBC # FLD: 12.9 % — SIGNIFICANT CHANGE UP (ref 10.3–14.5)
SODIUM SERPL-SCNC: 138 MMOL/L — SIGNIFICANT CHANGE UP (ref 135–145)
WBC # BLD: 11.2 K/UL — HIGH (ref 3.8–10.5)
WBC # FLD AUTO: 11.2 K/UL — HIGH (ref 3.8–10.5)

## 2025-09-04 PROCEDURE — 36415 COLL VENOUS BLD VENIPUNCTURE: CPT

## 2025-09-04 PROCEDURE — 80048 BASIC METABOLIC PNL TOTAL CA: CPT

## 2025-09-04 PROCEDURE — C1758: CPT

## 2025-09-04 PROCEDURE — 85027 COMPLETE CBC AUTOMATED: CPT

## 2025-09-04 PROCEDURE — C1771: CPT

## 2025-09-04 RX ORDER — POLYETHYLENE GLYCOL 3350 17 G/17G
17 POWDER, FOR SOLUTION ORAL
Qty: 0 | Refills: 0 | DISCHARGE
Start: 2025-09-04

## 2025-09-04 RX ORDER — GABAPENTIN 400 MG/1
1 CAPSULE ORAL
Qty: 42 | Refills: 0
Start: 2025-09-04 | End: 2025-09-17

## 2025-09-04 RX ADMIN — AMLODIPINE BESYLATE 5 MILLIGRAM(S): 10 TABLET ORAL at 05:13

## 2025-09-04 RX ADMIN — Medication 975 MILLIGRAM(S): at 05:11

## 2025-09-04 RX ADMIN — GABAPENTIN 300 MILLIGRAM(S): 400 CAPSULE ORAL at 00:07

## 2025-09-04 RX ADMIN — HEPARIN SODIUM 5000 UNIT(S): 1000 INJECTION INTRAVENOUS; SUBCUTANEOUS at 05:11

## 2025-09-04 RX ADMIN — Medication 975 MILLIGRAM(S): at 06:11

## 2025-09-04 RX ADMIN — LISINOPRIL 10 MILLIGRAM(S): 5 TABLET ORAL at 05:12

## 2025-09-18 ENCOUNTER — APPOINTMENT (OUTPATIENT)
Dept: UROGYNECOLOGY | Facility: CLINIC | Age: 85
End: 2025-09-18

## 2025-09-18 VITALS
HEIGHT: 66 IN | BODY MASS INDEX: 25.71 KG/M2 | RESPIRATION RATE: 16 BRPM | DIASTOLIC BLOOD PRESSURE: 77 MMHG | SYSTOLIC BLOOD PRESSURE: 144 MMHG | HEART RATE: 69 BPM | WEIGHT: 160 LBS | TEMPERATURE: 97.9 F | OXYGEN SATURATION: 96 %

## 2025-09-18 DIAGNOSIS — Z98.890 OTHER SPECIFIED POSTPROCEDURAL STATES: ICD-10-CM

## (undated) DEVICE — FOLEY TRAY 16FR LF URINE METER SURESTEP

## (undated) DEVICE — TUBING TUR 2 PRONG

## (undated) DEVICE — Device

## (undated) DEVICE — SUT CAPIO OPEN ACCESS SUTURE CAPTURING DEVICE

## (undated) DEVICE — TUBING SUCTION 20FT

## (undated) DEVICE — PREP BETADINE KIT

## (undated) DEVICE — SPECIMEN CONTAINER 100ML

## (undated) DEVICE — SOL IRR POUR NS 0.9% 500ML

## (undated) DEVICE — DRAPE TOWEL BLUE 17" X 24"

## (undated) DEVICE — SUT PDS II 2-0 27" CT-1

## (undated) DEVICE — VENODYNE/SCD SLEEVE CALF LARGE

## (undated) DEVICE — WARMING BLANKET UPPER ADULT

## (undated) DEVICE — PACK MINOR WITH LAP

## (undated) DEVICE — PACKING GAUZE PLAIN 2"

## (undated) DEVICE — NDL COUNTER FOAM AND MAGNET 40-70

## (undated) DEVICE — DRAPE LIGHT HANDLE COVER (BLUE)

## (undated) DEVICE — SUT VICRYL 2-0 36" CT-1

## (undated) DEVICE — DRAPE INSTRUMENT POUCH 6.75" X 11"

## (undated) DEVICE — LONE STAR RETRACTOR RING 32.5CM X 18.3CM DISP

## (undated) DEVICE — PACK LITHOTOMY

## (undated) DEVICE — SYR LUER LOK 20CC

## (undated) DEVICE — LONE STAR ELASTIC STAY HOOK 5MM SHARP